# Patient Record
Sex: MALE | Race: BLACK OR AFRICAN AMERICAN | NOT HISPANIC OR LATINO | URBAN - METROPOLITAN AREA
[De-identification: names, ages, dates, MRNs, and addresses within clinical notes are randomized per-mention and may not be internally consistent; named-entity substitution may affect disease eponyms.]

---

## 2024-10-04 ENCOUNTER — EMERGENCY (EMERGENCY)
Facility: HOSPITAL | Age: 24
LOS: 0 days | Discharge: HOME | End: 2024-10-05
Attending: STUDENT IN AN ORGANIZED HEALTH CARE EDUCATION/TRAINING PROGRAM
Payer: MEDICAID

## 2024-10-04 VITALS
OXYGEN SATURATION: 94 % | HEART RATE: 125 BPM | RESPIRATION RATE: 22 BRPM | DIASTOLIC BLOOD PRESSURE: 81 MMHG | TEMPERATURE: 102 F | WEIGHT: 225.09 LBS | SYSTOLIC BLOOD PRESSURE: 145 MMHG | HEIGHT: 74 IN

## 2024-10-04 DIAGNOSIS — N45.1 EPIDIDYMITIS: ICD-10-CM

## 2024-10-04 DIAGNOSIS — N50.812 LEFT TESTICULAR PAIN: ICD-10-CM

## 2024-10-04 LAB
ALBUMIN SERPL ELPH-MCNC: 3.6 G/DL — SIGNIFICANT CHANGE UP (ref 3.3–5)
ALP SERPL-CCNC: 67 U/L — SIGNIFICANT CHANGE UP (ref 40–120)
ALT FLD-CCNC: 52 U/L — SIGNIFICANT CHANGE UP (ref 12–78)
ANION GAP SERPL CALC-SCNC: 6 MMOL/L — SIGNIFICANT CHANGE UP (ref 5–17)
APPEARANCE UR: CLEAR — SIGNIFICANT CHANGE UP
AST SERPL-CCNC: 16 U/L — SIGNIFICANT CHANGE UP (ref 15–37)
BASOPHILS # BLD AUTO: 0.06 K/UL — SIGNIFICANT CHANGE UP (ref 0–0.2)
BASOPHILS NFR BLD AUTO: 0.3 % — SIGNIFICANT CHANGE UP (ref 0–2)
BILIRUB SERPL-MCNC: 1 MG/DL — SIGNIFICANT CHANGE UP (ref 0.2–1.2)
BILIRUB UR-MCNC: NEGATIVE — SIGNIFICANT CHANGE UP
BUN SERPL-MCNC: 11 MG/DL — SIGNIFICANT CHANGE UP (ref 7–23)
CALCIUM SERPL-MCNC: 9.3 MG/DL — SIGNIFICANT CHANGE UP (ref 8.5–10.1)
CHLORIDE SERPL-SCNC: 105 MMOL/L — SIGNIFICANT CHANGE UP (ref 96–108)
CO2 SERPL-SCNC: 26 MMOL/L — SIGNIFICANT CHANGE UP (ref 22–31)
COLOR SPEC: YELLOW — SIGNIFICANT CHANGE UP
CREAT SERPL-MCNC: 1.12 MG/DL — SIGNIFICANT CHANGE UP (ref 0.5–1.3)
DIFF PNL FLD: NEGATIVE — SIGNIFICANT CHANGE UP
EGFR: 93 ML/MIN/1.73M2 — SIGNIFICANT CHANGE UP
EOSINOPHIL # BLD AUTO: 0.01 K/UL — SIGNIFICANT CHANGE UP (ref 0–0.5)
EOSINOPHIL NFR BLD AUTO: 0.1 % — SIGNIFICANT CHANGE UP (ref 0–6)
FLUAV AG NPH QL: SIGNIFICANT CHANGE UP
FLUBV AG NPH QL: SIGNIFICANT CHANGE UP
GLUCOSE SERPL-MCNC: 280 MG/DL — HIGH (ref 70–99)
GLUCOSE UR QL: >=1000 MG/DL
HCT VFR BLD CALC: 46.4 % — SIGNIFICANT CHANGE UP (ref 39–50)
HGB BLD-MCNC: 16 G/DL — SIGNIFICANT CHANGE UP (ref 13–17)
IMM GRANULOCYTES NFR BLD AUTO: 0.5 % — SIGNIFICANT CHANGE UP (ref 0–0.9)
KETONES UR-MCNC: ABNORMAL MG/DL
LACTATE SERPL-SCNC: 1.5 MMOL/L — SIGNIFICANT CHANGE UP (ref 0.7–2)
LEUKOCYTE ESTERASE UR-ACNC: NEGATIVE — SIGNIFICANT CHANGE UP
LYMPHOCYTES # BLD AUTO: 1.84 K/UL — SIGNIFICANT CHANGE UP (ref 1–3.3)
LYMPHOCYTES # BLD AUTO: 10.5 % — LOW (ref 13–44)
MAGNESIUM SERPL-MCNC: 1.6 MG/DL — SIGNIFICANT CHANGE UP (ref 1.6–2.6)
MCHC RBC-ENTMCNC: 28.8 PG — SIGNIFICANT CHANGE UP (ref 27–34)
MCHC RBC-ENTMCNC: 34.5 G/DL — SIGNIFICANT CHANGE UP (ref 32–36)
MCV RBC AUTO: 83.6 FL — SIGNIFICANT CHANGE UP (ref 80–100)
MONOCYTES # BLD AUTO: 1.9 K/UL — HIGH (ref 0–0.9)
MONOCYTES NFR BLD AUTO: 10.9 % — SIGNIFICANT CHANGE UP (ref 2–14)
NEUTROPHILS # BLD AUTO: 13.57 K/UL — HIGH (ref 1.8–7.4)
NEUTROPHILS NFR BLD AUTO: 77.7 % — HIGH (ref 43–77)
NITRITE UR-MCNC: NEGATIVE — SIGNIFICANT CHANGE UP
NRBC # BLD: 0 /100 WBCS — SIGNIFICANT CHANGE UP (ref 0–0)
PH UR: 5.5 — SIGNIFICANT CHANGE UP (ref 5–8)
PLATELET # BLD AUTO: 173 K/UL — SIGNIFICANT CHANGE UP (ref 150–400)
POTASSIUM SERPL-MCNC: 3.7 MMOL/L — SIGNIFICANT CHANGE UP (ref 3.5–5.3)
POTASSIUM SERPL-SCNC: 3.7 MMOL/L — SIGNIFICANT CHANGE UP (ref 3.5–5.3)
PROT SERPL-MCNC: 7.7 GM/DL — SIGNIFICANT CHANGE UP (ref 6–8.3)
PROT UR-MCNC: 100 MG/DL
RBC # BLD: 5.55 M/UL — SIGNIFICANT CHANGE UP (ref 4.2–5.8)
RBC # FLD: 12.6 % — SIGNIFICANT CHANGE UP (ref 10.3–14.5)
SARS-COV-2 RNA SPEC QL NAA+PROBE: SIGNIFICANT CHANGE UP
SODIUM SERPL-SCNC: 137 MMOL/L — SIGNIFICANT CHANGE UP (ref 135–145)
SP GR SPEC: >1.03 — HIGH (ref 1–1.03)
UROBILINOGEN FLD QL: 1 MG/DL — SIGNIFICANT CHANGE UP (ref 0.2–1)
WBC # BLD: 17.47 K/UL — HIGH (ref 3.8–10.5)
WBC # FLD AUTO: 17.47 K/UL — HIGH (ref 3.8–10.5)

## 2024-10-04 PROCEDURE — 71045 X-RAY EXAM CHEST 1 VIEW: CPT | Mod: 26

## 2024-10-04 PROCEDURE — 99285 EMERGENCY DEPT VISIT HI MDM: CPT

## 2024-10-04 PROCEDURE — 93010 ELECTROCARDIOGRAM REPORT: CPT

## 2024-10-04 PROCEDURE — 76870 US EXAM SCROTUM: CPT | Mod: 26

## 2024-10-04 RX ORDER — ACETAMINOPHEN 325 MG/1
975 TABLET ORAL ONCE
Refills: 0 | Status: COMPLETED | OUTPATIENT
Start: 2024-10-04 | End: 2024-10-04

## 2024-10-04 RX ORDER — SODIUM CHLORIDE 9 MG/ML
1000 INJECTION INTRAMUSCULAR; INTRAVENOUS; SUBCUTANEOUS ONCE
Refills: 0 | Status: COMPLETED | OUTPATIENT
Start: 2024-10-04 | End: 2024-10-04

## 2024-10-04 RX ORDER — DOXYCYCLINE MONOHYDRATE 100 MG
1 TABLET ORAL
Qty: 20 | Refills: 0
Start: 2024-10-04 | End: 2024-10-13

## 2024-10-04 RX ORDER — DOXYCYCLINE MONOHYDRATE 100 MG
100 TABLET ORAL ONCE
Refills: 0 | Status: COMPLETED | OUTPATIENT
Start: 2024-10-04 | End: 2024-10-04

## 2024-10-04 RX ORDER — ACETAMINOPHEN 325 MG/1
1000 TABLET ORAL ONCE
Refills: 0 | Status: COMPLETED | OUTPATIENT
Start: 2024-10-04 | End: 2024-10-04

## 2024-10-04 RX ADMIN — ACETAMINOPHEN 975 MILLIGRAM(S): 325 TABLET ORAL at 23:17

## 2024-10-04 RX ADMIN — Medication 500 MILLIGRAM(S): at 20:54

## 2024-10-04 RX ADMIN — ACETAMINOPHEN 400 MILLIGRAM(S): 325 TABLET ORAL at 18:10

## 2024-10-04 RX ADMIN — Medication 100 MILLIGRAM(S): at 20:54

## 2024-10-04 RX ADMIN — SODIUM CHLORIDE 1000 MILLILITER(S): 9 INJECTION INTRAMUSCULAR; INTRAVENOUS; SUBCUTANEOUS at 18:10

## 2024-10-04 NOTE — ED PROVIDER NOTE - CLINICAL SUMMARY MEDICAL DECISION MAKING FREE TEXT BOX
24-year-old male pmhx of no significant comes to ED w/ left testicular pain. Started randomly since 3 AM.  Due to persistent symptoms patient had come to emergency department. Their pain/symptom is moderate to severe, located in left testicle, constant, non mediating with rest. Otherwise ROS negative.  Denies concern for sexually transmitted infection.    General: non-toxic, NAD   HEENT: NCAT, PERRL   Cardiac: RRR, no murmurs, 2+ peripheral pulses   Chest: CTA   : Chaperoned by ED Tech, enlargement of testicle/epididymis with tenderness to palpation, intact cremasteric reflex, no visible external penile rashes, no visible penile discharge, no blood at the meatus.  Abdomen: soft, non-distended, bowel sounds present, no ttp, no rebound or guarding   Extremities: no peripheral edema, calf tenderness, or leg size discrepancies   Skin: no rashes   Neuro: AAOx4, 5+motor, sensory grossly intact   Psych: mood and affect appropriate    Impression: 24-year-old male pmhx of no significant comes to ED w/ left testicular pain. Their symptoms and exam findings are concerning for epididymitis versus torsion.    Ordered labs, imaging, medications for diagnosis, management, and treatment.

## 2024-10-04 NOTE — ED ADULT TRIAGE NOTE - NSSEPSISSUSPECTED_ED_A_ED
Yes Alert and oriented to person, place and time/Awake/Symptoms improved/Dressing clean and dry/No adverse reaction to first time med in ED

## 2024-10-04 NOTE — ED PROVIDER NOTE - PROGRESS NOTE DETAILS
Patient’s prescription sent to their pharmacy indicated during interview. Improvement on symptoms. Passed PO challenge. Spoke to patient/family about results including incidental findings. Plan to discharge patient. Patient given PCP follow up and return precautions. Patient/family agrees with plan.

## 2024-10-04 NOTE — ED PROVIDER NOTE - NSFOLLOWUPINSTRUCTIONS_ED_ALL_ED_FT
You were seen in the Emergency Department for testicular pain and swelling and you were found to have epididymitis.     1) Advance activity as tolerated.   2) New prescription sent to pharmacy indicated in interview take prescription as prescribed. Continue all previously prescribed medications as directed.    3) Follow up with your primary care physician in 24-48 hours - take copies of your results.    4) Return to the Emergency Department for worsening or persistent symptoms, and/or ANY NEW OR CONCERNING SYMPTOMS.     SEEK IMMEDIATE MEDICAL CARE IF YOU HAVE ANY OF THE FOLLOWING SYMPTOMS: severe abdominal pain, high fever, nausea/vomiting, or unintended weight loss.

## 2024-10-04 NOTE — ED ADULT NURSE NOTE - OBJECTIVE STATEMENT
23 yo male, A&Ox4, ambulatory presents to ED c/o Testicular pain with bump since yesterday. Denies fever (prior to today) or urinary symptoms HX HTN

## 2024-10-05 VITALS
DIASTOLIC BLOOD PRESSURE: 86 MMHG | HEART RATE: 104 BPM | RESPIRATION RATE: 20 BRPM | TEMPERATURE: 99 F | SYSTOLIC BLOOD PRESSURE: 120 MMHG | OXYGEN SATURATION: 98 %

## 2024-10-05 LAB
CULTURE RESULTS: SIGNIFICANT CHANGE UP
SPECIMEN SOURCE: SIGNIFICANT CHANGE UP
T PALLIDUM AB TITR SER: NEGATIVE — SIGNIFICANT CHANGE UP

## 2024-10-05 RX ORDER — IBUPROFEN 600 MG
600 TABLET ORAL ONCE
Refills: 0 | Status: COMPLETED | OUTPATIENT
Start: 2024-10-05 | End: 2024-10-05

## 2024-10-05 RX ADMIN — Medication 600 MILLIGRAM(S): at 01:45

## 2024-10-06 ENCOUNTER — INPATIENT (INPATIENT)
Facility: HOSPITAL | Age: 24
LOS: 15 days | Discharge: NURSING, REHAB OR LONG-TERM CARE | End: 2024-10-22
Attending: STUDENT IN AN ORGANIZED HEALTH CARE EDUCATION/TRAINING PROGRAM | Admitting: STUDENT IN AN ORGANIZED HEALTH CARE EDUCATION/TRAINING PROGRAM
Payer: MEDICAID

## 2024-10-06 VITALS
RESPIRATION RATE: 18 BRPM | TEMPERATURE: 102 F | SYSTOLIC BLOOD PRESSURE: 146 MMHG | HEIGHT: 74 IN | DIASTOLIC BLOOD PRESSURE: 97 MMHG | OXYGEN SATURATION: 98 % | WEIGHT: 279.99 LBS | HEART RATE: 132 BPM

## 2024-10-06 DIAGNOSIS — A41.9 SEPSIS, UNSPECIFIED ORGANISM: ICD-10-CM

## 2024-10-06 DIAGNOSIS — R03.0 ELEVATED BLOOD-PRESSURE READING, WITHOUT DIAGNOSIS OF HYPERTENSION: ICD-10-CM

## 2024-10-06 DIAGNOSIS — R73.9 HYPERGLYCEMIA, UNSPECIFIED: ICD-10-CM

## 2024-10-06 DIAGNOSIS — N49.2 INFLAMMATORY DISORDERS OF SCROTUM: ICD-10-CM

## 2024-10-06 LAB
ALBUMIN SERPL ELPH-MCNC: 3.1 G/DL — LOW (ref 3.3–5)
ALP SERPL-CCNC: 100 U/L — SIGNIFICANT CHANGE UP (ref 40–120)
ALT FLD-CCNC: 33 U/L — SIGNIFICANT CHANGE UP (ref 12–78)
ANION GAP SERPL CALC-SCNC: 9 MMOL/L — SIGNIFICANT CHANGE UP (ref 5–17)
APPEARANCE UR: CLEAR — SIGNIFICANT CHANGE UP
APTT BLD: 33.9 SEC — SIGNIFICANT CHANGE UP (ref 24.5–35.6)
AST SERPL-CCNC: 15 U/L — SIGNIFICANT CHANGE UP (ref 15–37)
BACTERIA # UR AUTO: ABNORMAL /HPF
BASOPHILS # BLD AUTO: 0 K/UL — SIGNIFICANT CHANGE UP (ref 0–0.2)
BASOPHILS NFR BLD AUTO: 0 % — SIGNIFICANT CHANGE UP (ref 0–2)
BILIRUB SERPL-MCNC: 1.1 MG/DL — SIGNIFICANT CHANGE UP (ref 0.2–1.2)
BILIRUB UR-MCNC: NEGATIVE — SIGNIFICANT CHANGE UP
BUN SERPL-MCNC: 13 MG/DL — SIGNIFICANT CHANGE UP (ref 7–23)
CALCIUM SERPL-MCNC: 9.7 MG/DL — SIGNIFICANT CHANGE UP (ref 8.5–10.1)
CHLORIDE SERPL-SCNC: 103 MMOL/L — SIGNIFICANT CHANGE UP (ref 96–108)
CO2 SERPL-SCNC: 23 MMOL/L — SIGNIFICANT CHANGE UP (ref 22–31)
COLOR SPEC: YELLOW — SIGNIFICANT CHANGE UP
CREAT SERPL-MCNC: 1.05 MG/DL — SIGNIFICANT CHANGE UP (ref 0.5–1.3)
DIFF PNL FLD: ABNORMAL
EGFR: 100 ML/MIN/1.73M2 — SIGNIFICANT CHANGE UP
EOSINOPHIL # BLD AUTO: 0 K/UL — SIGNIFICANT CHANGE UP (ref 0–0.5)
EOSINOPHIL NFR BLD AUTO: 0 % — SIGNIFICANT CHANGE UP (ref 0–6)
FLUAV AG NPH QL: SIGNIFICANT CHANGE UP
FLUBV AG NPH QL: SIGNIFICANT CHANGE UP
GLUCOSE SERPL-MCNC: 340 MG/DL — HIGH (ref 70–99)
GLUCOSE UR QL: >=1000 MG/DL
HCT VFR BLD CALC: 45.7 % — SIGNIFICANT CHANGE UP (ref 39–50)
HGB BLD-MCNC: 15.9 G/DL — SIGNIFICANT CHANGE UP (ref 13–17)
HYPOSEGMENTATION: PRESENT — SIGNIFICANT CHANGE UP
INR BLD: 1.29 RATIO — HIGH (ref 0.85–1.16)
KETONES UR-MCNC: 15 MG/DL
LACTATE SERPL-SCNC: 1.7 MMOL/L — SIGNIFICANT CHANGE UP (ref 0.7–2)
LEUKOCYTE ESTERASE UR-ACNC: NEGATIVE — SIGNIFICANT CHANGE UP
LYMPHOCYTES # BLD AUTO: 0.95 K/UL — LOW (ref 1–3.3)
LYMPHOCYTES # BLD AUTO: 5 % — LOW (ref 13–44)
MANUAL SMEAR VERIFICATION: SIGNIFICANT CHANGE UP
MCHC RBC-ENTMCNC: 29 PG — SIGNIFICANT CHANGE UP (ref 27–34)
MCHC RBC-ENTMCNC: 34.8 G/DL — SIGNIFICANT CHANGE UP (ref 32–36)
MCV RBC AUTO: 83.4 FL — SIGNIFICANT CHANGE UP (ref 80–100)
MONOCYTES # BLD AUTO: 1.89 K/UL — HIGH (ref 0–0.9)
MONOCYTES NFR BLD AUTO: 10 % — SIGNIFICANT CHANGE UP (ref 2–14)
NEUTROPHILS # BLD AUTO: 15.7 K/UL — HIGH (ref 1.8–7.4)
NEUTROPHILS NFR BLD AUTO: 68 % — SIGNIFICANT CHANGE UP (ref 43–77)
NEUTS BAND # BLD: 15 % — HIGH (ref 0–8)
NEUTS VAC BLD QL SMEAR: SIGNIFICANT CHANGE UP
NITRITE UR-MCNC: NEGATIVE — SIGNIFICANT CHANGE UP
NRBC # BLD: 0 /100 WBCS — SIGNIFICANT CHANGE UP (ref 0–0)
NRBC # BLD: SIGNIFICANT CHANGE UP /100 WBCS (ref 0–0)
PH UR: 5.5 — SIGNIFICANT CHANGE UP (ref 5–8)
PLAT MORPH BLD: NORMAL — SIGNIFICANT CHANGE UP
PLATELET # BLD AUTO: 172 K/UL — SIGNIFICANT CHANGE UP (ref 150–400)
POTASSIUM SERPL-MCNC: 4.1 MMOL/L — SIGNIFICANT CHANGE UP (ref 3.5–5.3)
POTASSIUM SERPL-SCNC: 4.1 MMOL/L — SIGNIFICANT CHANGE UP (ref 3.5–5.3)
PROT SERPL-MCNC: 8.2 GM/DL — SIGNIFICANT CHANGE UP (ref 6–8.3)
PROT UR-MCNC: 100 MG/DL
PROTHROM AB SERPL-ACNC: 14.5 SEC — HIGH (ref 9.9–13.4)
RBC # BLD: 5.48 M/UL — SIGNIFICANT CHANGE UP (ref 4.2–5.8)
RBC # FLD: 12.5 % — SIGNIFICANT CHANGE UP (ref 10.3–14.5)
RBC BLD AUTO: NORMAL — SIGNIFICANT CHANGE UP
RBC CASTS # UR COMP ASSIST: 5 /HPF — HIGH (ref 0–4)
SARS-COV-2 RNA SPEC QL NAA+PROBE: SIGNIFICANT CHANGE UP
SODIUM SERPL-SCNC: 135 MMOL/L — SIGNIFICANT CHANGE UP (ref 135–145)
SP GR SPEC: >1.03 — HIGH (ref 1–1.03)
UROBILINOGEN FLD QL: 1 MG/DL — SIGNIFICANT CHANGE UP (ref 0.2–1)
VARIANT LYMPHS # BLD: 2 % — SIGNIFICANT CHANGE UP (ref 0–6)
WBC # BLD: 18.92 K/UL — HIGH (ref 3.8–10.5)
WBC # FLD AUTO: 18.92 K/UL — HIGH (ref 3.8–10.5)
WBC UR QL: 3 /HPF — SIGNIFICANT CHANGE UP (ref 0–5)

## 2024-10-06 PROCEDURE — 99223 1ST HOSP IP/OBS HIGH 75: CPT

## 2024-10-06 PROCEDURE — 74177 CT ABD & PELVIS W/CONTRAST: CPT | Mod: 26,MC

## 2024-10-06 PROCEDURE — 99285 EMERGENCY DEPT VISIT HI MDM: CPT

## 2024-10-06 PROCEDURE — 93010 ELECTROCARDIOGRAM REPORT: CPT

## 2024-10-06 PROCEDURE — 71045 X-RAY EXAM CHEST 1 VIEW: CPT | Mod: 26

## 2024-10-06 PROCEDURE — 99222 1ST HOSP IP/OBS MODERATE 55: CPT

## 2024-10-06 RX ORDER — ENOXAPARIN SODIUM 40MG/0.4ML
40 SYRINGE (ML) SUBCUTANEOUS EVERY 12 HOURS
Refills: 0 | Status: DISCONTINUED | OUTPATIENT
Start: 2024-10-06 | End: 2024-10-07

## 2024-10-06 RX ORDER — ACETAMINOPHEN 500 MG
975 TABLET ORAL ONCE
Refills: 0 | Status: COMPLETED | OUTPATIENT
Start: 2024-10-06 | End: 2024-10-06

## 2024-10-06 RX ORDER — ONDANSETRON HYDROCHLORIDE 2 MG/ML
4 INJECTION, SOLUTION INTRAMUSCULAR; INTRAVENOUS EVERY 8 HOURS
Refills: 0 | Status: DISCONTINUED | OUTPATIENT
Start: 2024-10-06 | End: 2024-10-07

## 2024-10-06 RX ORDER — PIPERACILLIN AND TAZOBACTAM .5; 4 G/20ML; G/20ML
3.38 INJECTION, POWDER, LYOPHILIZED, FOR SOLUTION INTRAVENOUS ONCE
Refills: 0 | Status: COMPLETED | OUTPATIENT
Start: 2024-10-06 | End: 2024-10-06

## 2024-10-06 RX ORDER — MORPHINE SULFATE 30 MG/1
4 TABLET, EXTENDED RELEASE ORAL ONCE
Refills: 0 | Status: DISCONTINUED | OUTPATIENT
Start: 2024-10-06 | End: 2024-10-06

## 2024-10-06 RX ORDER — MAGNESIUM, ALUMINUM HYDROXIDE 200-200 MG
30 TABLET,CHEWABLE ORAL EVERY 4 HOURS
Refills: 0 | Status: DISCONTINUED | OUTPATIENT
Start: 2024-10-06 | End: 2024-10-07

## 2024-10-06 RX ORDER — VANCOMYCIN HYDROCHLORIDE 50 MG/ML
1500 KIT ORAL ONCE
Refills: 0 | Status: COMPLETED | OUTPATIENT
Start: 2024-10-06 | End: 2024-10-06

## 2024-10-06 RX ORDER — INFLUENZ VIR VAC TV P-SURF2003 15MCG/.5ML
0.5 SYRINGE (ML) INTRAMUSCULAR ONCE
Refills: 0 | Status: DISCONTINUED | OUTPATIENT
Start: 2024-10-06 | End: 2024-10-22

## 2024-10-06 RX ORDER — CLINDAMYCIN PHOSPHATE 150 MG/ML
900 VIAL (ML) INJECTION EVERY 8 HOURS
Refills: 0 | Status: DISCONTINUED | OUTPATIENT
Start: 2024-10-06 | End: 2024-10-07

## 2024-10-06 RX ORDER — CLINDAMYCIN PHOSPHATE 150 MG/ML
900 VIAL (ML) INJECTION ONCE
Refills: 0 | Status: COMPLETED | OUTPATIENT
Start: 2024-10-06 | End: 2024-10-06

## 2024-10-06 RX ORDER — ACETAMINOPHEN 500 MG
650 TABLET ORAL EVERY 6 HOURS
Refills: 0 | Status: DISCONTINUED | OUTPATIENT
Start: 2024-10-06 | End: 2024-10-07

## 2024-10-06 RX ORDER — ACETAMINOPHEN 500 MG
1000 TABLET ORAL ONCE
Refills: 0 | Status: COMPLETED | OUTPATIENT
Start: 2024-10-06 | End: 2024-10-06

## 2024-10-06 RX ORDER — MELATONIN 5 MG
3 TABLET ORAL AT BEDTIME
Refills: 0 | Status: DISCONTINUED | OUTPATIENT
Start: 2024-10-06 | End: 2024-10-07

## 2024-10-06 RX ADMIN — MORPHINE SULFATE 4 MILLIGRAM(S): 30 TABLET, EXTENDED RELEASE ORAL at 18:45

## 2024-10-06 RX ADMIN — Medication 1000 MILLIGRAM(S): at 21:37

## 2024-10-06 RX ADMIN — Medication 400 MILLIGRAM(S): at 20:37

## 2024-10-06 RX ADMIN — MORPHINE SULFATE 4 MILLIGRAM(S): 30 TABLET, EXTENDED RELEASE ORAL at 18:58

## 2024-10-06 RX ADMIN — Medication 975 MILLIGRAM(S): at 15:27

## 2024-10-06 RX ADMIN — MORPHINE SULFATE 4 MILLIGRAM(S): 30 TABLET, EXTENDED RELEASE ORAL at 14:39

## 2024-10-06 RX ADMIN — MORPHINE SULFATE 4 MILLIGRAM(S): 30 TABLET, EXTENDED RELEASE ORAL at 15:39

## 2024-10-06 RX ADMIN — Medication 900 MILLIGRAM(S): at 15:39

## 2024-10-06 RX ADMIN — Medication 100 MILLIGRAM(S): at 23:40

## 2024-10-06 RX ADMIN — PIPERACILLIN AND TAZOBACTAM 3.38 GRAM(S): .5; 4 INJECTION, POWDER, LYOPHILIZED, FOR SOLUTION INTRAVENOUS at 15:09

## 2024-10-06 RX ADMIN — PIPERACILLIN AND TAZOBACTAM 200 GRAM(S): .5; 4 INJECTION, POWDER, LYOPHILIZED, FOR SOLUTION INTRAVENOUS at 14:39

## 2024-10-06 RX ADMIN — VANCOMYCIN HYDROCHLORIDE 250 MILLIGRAM(S): KIT at 15:42

## 2024-10-06 RX ADMIN — Medication 975 MILLIGRAM(S): at 14:27

## 2024-10-06 RX ADMIN — Medication 40 MILLIGRAM(S): at 18:00

## 2024-10-06 RX ADMIN — Medication 2500 MILLILITER(S): at 14:26

## 2024-10-06 RX ADMIN — Medication 100 MILLIGRAM(S): at 15:09

## 2024-10-06 RX ADMIN — Medication 2500 MILLILITER(S): at 15:26

## 2024-10-06 NOTE — ED PROVIDER NOTE - PHYSICAL EXAMINATION
PHYSICAL EXAM:    GENERAL: Alert, appears stated age, non-toxic  SKIN: Warm, and dry.   HEAD: NC, AT  EYE: Normal lids/conjunctiva  ENT: Normal hearing, patent oropharynx   NECK: +supple. No meningismus, or JVD  Pulm: Bilateral BS, normal resp effort, no wheezes, stridor, or retractions  CV: RRR, no M/R/G, 2+and = radial pulses  Abd: soft, non-tender, non-distended, no rebound/guarding. no CVA tenderness.   : external genitalia, edematous, erythematous, ttp. no crepitus/streaking. no lad. testicles descended bilaterally. +cremasteric reflexes bilaterally. no discharge. Performed by Dr. Pleitez, chaperoned by me.    Mskel: no erythema, cyanosis, edema. no calf tenderness.   Neuro: AAOx3, moving extremities

## 2024-10-06 NOTE — CONSULT NOTE ADULT - SUBJECTIVE AND OBJECTIVE BOX
Patient is a 24y old  Male who presents with a chief complaint of     HPI: 25 yo M no sig pmhx presented to er on 10/4 for left sided scrotal swelling and pain associated with fevers, found to haveleukocytosis on 17.47, scrotal US consistent with soft tissue infection and d/c home on doxy. pt returned to er on 10/6 for worsening scrotal swelling and pain, and persistent fevers. pt denies any injury, cut, trauma to the scrotal area. denies any discharge, dysuria, hematuria, pain in the penis.       PAST MEDICAL & SURGICAL HISTORY:      Review of Systems:  Negative except  as above in HPI    MEDICATIONS  (STANDING):    MEDICATIONS  (PRN):      Allergies    Allergy Status Unknown    Intolerances        SOCIAL HISTORY nonsmoker, no etoh use                Vital Signs Last 24 Hrs  T(C): 38.8 (06 Oct 2024 13:32), Max: 38.8 (06 Oct 2024 13:32)  T(F): 101.9 (06 Oct 2024 13:32), Max: 101.9 (06 Oct 2024 13:32)  HR: 132 (06 Oct 2024 13:32) (132 - 132)  BP: 146/97 (06 Oct 2024 13:32) (146/97 - 146/97)  BP(mean): --  RR: 18 (06 Oct 2024 13:32) (18 - 18)  SpO2: 98% (06 Oct 2024 13:32) (98% - 98%)        Physical Exam:  General:  Appears stated age, well-groomed, uncomfortable 2/2 pain   Eyes: EOMI, conjunctiva clear  HENT:  WNL, no JVD  Chest: no respiratory distress, no accessory muscle use  : uncircumcised phallus. diffuse scrotal edema, erythema, induration. + ttp. no area of fluctuance, open wound or active drainage.   Neuro/Psych:  Alert, oriented to time, place and person     LABS:                        15.9   18.92 )-----------( 172      ( 06 Oct 2024 14:28 )             45.7     10-06    135  |  103  |  13  ----------------------------<  340[H]  4.1   |  23  |  1.05    Ca    9.7      06 Oct 2024 14:28  Mg     1.6     10-04    TPro  8.2  /  Alb  3.1[L]  /  TBili  1.1  /  DBili  x   /  AST  15  /  ALT  33  /  AlkPhos  100  10-06    PT/INR - ( 06 Oct 2024 14:28 )   PT: 14.5 sec;   INR: 1.29 ratio         PTT - ( 06 Oct 2024 14:28 )  PTT:33.9 sec  Urinalysis Basic - ( 06 Oct 2024 14:51 )    Color: Yellow / Appearance: Clear / SG: >1.030 / pH: x  Gluc: x / Ketone: 15 mg/dL  / Bili: Negative / Urobili: 1.0 mg/dL   Blood: x / Protein: 100 mg/dL / Nitrite: Negative   Leuk Esterase: Negative / RBC: 5 /HPF / WBC 3 /HPF   Sq Epi: x / Non Sq Epi: x / Bacteria: Few /HPF      Culture Results:   <10,000 CFU/mL Normal Urogenital Prieto (10-04 @ 18:06)      RADIOLOGY & ADDITIONAL STUDIES:  < from: CT Abdomen and Pelvis w/ IV Cont (10.06.24 @ 15:18) >  BLADDER: Under distended, unremarkable.  REPRODUCTIVE ORGANS: Prostate within normal limits. Probable generalized   scrotal skin thickening/edema. There is mild perineal and base of the   penis extension. No focal gas collections.    BOWEL: No bowel obstruction. Appendix is normal.  PERITONEUM/RETROPERITONEUM: Within normal limits.  VESSELS: Within normal limits.  LYMPH NODES: Left retroperitoneal nodes measuring up to 1.4 cm in short   axis below the level of left renal vein.  ABDOMINAL WALL: As above.  BONES: Within normal limits.    IMPRESSION:  Diffuse edema involving the scrotal skin with mild perineal extension. No   focal gas collections.        --- End of Report ---      < end of copied text >      A/P; 25 yo M no significant pmhx here with worsening scrotal cellulitis that failed outpt antibiotics tx with doxycyline.   febrile tmax 101.9, tachycardic. leukocytosis of 18.92  lactate wnl, CT with no focal gas collection.   Ucx 10/4 normal urogenital prieto  -recommend medical admission for IV antibiotics  -advise ID consult  -no acute urological intervention  -analgesics prn, elevate scrotum, warm compresses  -f/u rpt ucx, and bcx  -case discussed with Dr Martin, will continue to follow

## 2024-10-06 NOTE — H&P ADULT - ASSESSMENT
Jose Low Jr. is a 24 year old male with no significant PMHx who presented to the ED on 10/6/24 for complaints of left testicular pain and admitted for sepsis secondary to scrotal cellulitis vs. epididymitis.    Sepsis secondary to scrotal cellulitis vs. epididymitis  Complaints of L. testicular pain x 4 days, described as a burning sensation  Denies trauma to area, not sexually active  Took 2-day course of doxycycline 100 mg BID without improvement of symptoms and still spiking fevers with Tmax 101 at home  U/A (on 10/4) with trace ketones, proteinuria, negative leuks, negative nitrites, WBC 0, RBC 1, occasional bacteria, glucosuria  Urine culture (on 10/4) with normal urogenital prieto  U/S scrotum and testicles (on 10/4) without evidence of testicular torsion but with marked L. scrotal wall thickening, edema, and mild hyperemia of L. epididymal body and tail  Temp 101.9, , WBC 18.92K on admission  U/A with ketonuria, proteinuria, negative nitrites, negative leuks, small blood, WBC 3, RBC 5, few bacteria, glucosuria  CT A/P with diffuse edema involving the scrotal skin with mild perineal extension, no focal gas collections  S/p LR 2500 cc bolus, vancomycin 1500 mg IV, zosyn 3.375 g IV, clindamycin 900 mg IV, acetaminophen 975 mg PO, and morphine 4 mg IV in the ED  Elevate scrotum, warm compresses, pain management PRN  Clindamycin 900 mg IV q8 continued for now  No acute surgical intervention as per urology  F/u blood cultures, urine culture  Monitor fever curve and WBC trend  Consider ID consult  Urology recommendations appreciated    Hyperglycemia  Blood glucose 340 on admission  U/A with ketonuria and glucosuria  F/u A1c    Elevated blood pressure without diagnosis of HTN, suspect secondary to pain  BP as elevated as 153/98 on admission  Will hold off on initiation of antihypertensives at this time  Anticipate improvement of BP with adequate pain control    Plan of care discussed with sister at bedside.

## 2024-10-06 NOTE — H&P ADULT - NSHPLABSRESULTS_GEN_ALL_CORE
15.9   18.92 )-----------( 172      ( 06 Oct 2024 14:28 )             45.7     135  |  103  |  13  ----------------------------<  340[H]     10-06  4.1   |  23  |  1.05    Ca    9.7      06 Oct 2024 14:28    TPro  8.2  /  Alb  3.1[L]  /  TBili  1.1  /  DBili  x   /  AST  15  /  ALT  33  /  AlkPhos  100  10-06    PT/INR: 14.5/1.29 (10-06-24 @ 14:28)  PTT: 33.9 (10-06-24 @ 14:28)    Urinalysis Basic - ( 06 Oct 2024 14:51 )  Color: Yellow / Appearance: Clear / SG: >1.030 / pH: 5.5  Gluc: >=1000 mg/dL / Ketone: 15 mg/dL  / Bili: Negative / Urobili: 1.0 mg/dL   Blood: Small / Protein: 100 mg/dL / Nitrite: Negative   Leuk Esterase: Negative / RBC: 5 /HPF / WBC 3 /HPF   Sq Epi: x / Bacteria: Few /HPF  Hyaline Casts: x/WBC Casts: x    Culture - Urine (collected 04 Oct 2024 18:06)  Source: Clean Catch Clean Catch (Midstream)  Final Report (05 Oct 2024 22:41):    <10,000 CFU/mL Normal Urogenital Henny    CT A/P with IV contrast 10/6/24  FINDINGS:  LOWER CHEST: Trace atelectasis.    LIVER: Steatosis.  BILE DUCTS: Normal caliber.  GALLBLADDER: Within normal limits.  SPLEEN: Within normal limits.  PANCREAS: Within normal limits.  ADRENALS: Within normal limits.  KIDNEYS/URETERS: Within normal limits.    BLADDER: Under distended, unremarkable.  REPRODUCTIVE ORGANS: Prostate within normal limits. Probable generalized scrotal skin thickening/edema. There is mild perineal and base of the penis extension. No focal gas collections.    BOWEL: No bowel obstruction. Appendix is normal.  PERITONEUM/RETROPERITONEUM: Within normal limits.  VESSELS: Within normal limits.  LYMPH NODES: Left retroperitoneal nodes measuring up to 1.4 cm in short   axis below the level of left renal vein.  ABDOMINAL WALL: As above.  BONES: Within normal limits.    IMPRESSION:  Diffuse edema involving the scrotal skin with mild perineal extension. No focal gas collections.    U/S testicles 10/4/24  IMPRESSION:  No sonographic evidence of testicular torsion.    Marked left scrotal wall thickening, edema, and mild hyperemia. Correlate clinically forsoft tissue infection. No drainable collection.    Mild hyperemia of the left epididymal body and tail. Early epididymitis cannot be excluded.

## 2024-10-06 NOTE — ED ADULT TRIAGE NOTE - CHIEF COMPLAINT QUOTE
pt c/o testicular swellling started x 3 days ago, pt seen in ED x 2 days ago and given abx. pt now complains of worsening pain, headache and consistent fever.

## 2024-10-06 NOTE — PATIENT PROFILE ADULT - FALL HARM RISK - HARM RISK INTERVENTIONS
Assistance OOB with selected safe patient handling equipment/Communicate Risk of Fall with Harm to all staff/Reinforce activity limits and safety measures with patient and family/Tailored Fall Risk Interventions/Toileting schedule using arm’s reach rule for commode and bathroom/Visual Cue: Yellow wristband and red socks/Bed in lowest position, wheels locked, appropriate side rails in place/Call bell, personal items and telephone in reach/Instruct patient to call for assistance before getting out of bed or chair/Non-slip footwear when patient is out of bed/[place] to call system/Physically safe environment - no spills, clutter or unnecessary equipment/Purposeful Proactive Rounding/Room/bathroom lighting operational, light cord in reach

## 2024-10-06 NOTE — H&P ADULT - NSHPPHYSICALEXAM_GEN_ALL_CORE
T(C): 37.8 (10-06-24 @ 18:34), Max: 38.8 (10-06-24 @ 13:32)  HR: 122 (10-06-24 @ 18:34) (118 - 132)  BP: 143/80 (10-06-24 @ 18:34) (139/90 - 153/98)  RR: 20 (10-06-24 @ 18:34) (18 - 20)  SpO2: 95% (10-06-24 @ 18:34) (95% - 98%)    CONSTITUTIONAL: Well groomed, uncomfortable appearing  EYES: PERRLA and symmetric, EOMI  ENMT: Oral mucosa with moist membranes  RESP: No respiratory distress, no use of accessory muscles; CTA b/l  CV: RRR  GI: Soft, NT, ND  : scrotum with erythema, diffuse swelling, and tender to palpation, no open wounds or purulent drainage

## 2024-10-06 NOTE — ED ADULT NURSE NOTE - OBJECTIVE STATEMENT
24 year old male A/Ox4 c/o testicular pain x 3 days ago, pt reports fever, diarrhea, testicular pain, pt denies burning urination, blood in the urine, constipation, pt reports seen in ED x 2 days ago for same pain, prescribed abx but pain came back, pt reports no past medical history and pt ambulatory with steady gait

## 2024-10-06 NOTE — H&P ADULT - HISTORY OF PRESENT ILLNESS
Jose Low Jr. is a 24 year old male with no significant PMHx who presented to the ED on 10/6/24 for complaints of left testicular pain.    Patient reports he has been having left testicular pain since Thursday. Described as a burning sensation and severe pain to the point where he is unable to eat or sleep. Severity was 10/10. Did not take any analgesics for the pain. No trauma to area. Not currently sexually active and has never been. Came to the ER on 10/4/24 for further evaluation. WBC 17.47K at that time. U/A with trace ketones, proteinuria, negative leuks, negative nitrites, WBC 0, RBC 1, occasional bacteria, glucosuria. U/S scrotum and testicles without evidence of testicular torsion but with marked left scrotal wall thickening, edema, and mild hyperemia of left epididymal body and tail. Received ceftriaxone 500 mg IV x 1. Discharged home with prescription for doxycycline 100 mg BID. Spiking fevers with Tmax 101 and severe pain despite taking doxycycline and tylenol which prompted another visit to ER.     In the ED, Tmax 101.9, HR as elevated as 132, BP as elevated as 153/98. WBC 18.92K, blood glucose 340. U/A with ketonuria, proteinuria, negative nitrites, negative leuks, small blood, WBC 3, RBC 5, few bacteria, glucosuria. CT A/P with diffuse edema involving the scrotal skin with mild perineal extension. No focal gas collections. Received LR 2500 cc bolus, vancomycin 1500 mg IV, zosyn 3.375 g IV, clindamycin 900 mg IV, acetaminophen 975 mg PO, and morphine 4 mg IV. Evaluated by urology who recommended admission for IV antibiotics and does not recommend acute urologic intervention.

## 2024-10-06 NOTE — ED PROVIDER NOTE - ATTENDING APP SHARED VISIT CONTRIBUTION OF CARE
This patient was evaluated with GRACE Snell.  The patient's HPI, ROS, and physical exam above were noted and agreed to unless otherwise commented on below.  Nursing notes and vital signs were reviewed.  Patient arrived was noted to have a large amount of scrotal swelling and erythema.  There is an initial concern for Raven's.  Patient was given clindamycin, vancomycin, and Zosyn.  Does have elevation of his white blood cell count at 18.92.  No acute anemia.  No thrombocytopenia.  Has an elevated INR at 1.29.  No significant electrolyte abnormalities.  Elevated glucose.  No acute elevated LFTs.  Urinalysis shows no evidence of urinary tract infection.  COVID and flu are negative.  Patient CT of the abdomen pelvis shows no evidence of Raven's gangrene at this time.  Shows diffuse edema involving scrotal skin and mild peritoneal extension.  No focal gas collection is noted.  Likely has evidence of scrotal cellulitis with failed outpatient antibiotics.    Discussed this case with the surgery PA who came and evaluated the patient.  They also believe that the patient may have scrotal cellulitis.  Recommend admission to the medical service with surgery follow-up.    PA was able to discuss this case with the hospitalist service who accepted admission for the patient.  Patient experienced understanding of all findings.  He was amenable to this plan.  Amenable for admission at this time.    This patient presents with evidence of a high probability of an imminent life or limb threatening condition requiring rapid intervention to prevent deterioration in the patient’s condition.  High complexity decision making to assess, manipulate, and support this patient is documented below.  It is my clinical judgement that failure to initiate these interventions on an urgent basis would likely result in sudden, clinically significant or life threatening deterioration in the patient's condition.      The total time spent evaluating, managing, and providing care to a critically ill patient was: 36 minutes.  This includes direct patient care at the bedside as well as time spent reviewing test results, discussing the case with consultants or family members, and documenting in the patient's chart.   It was exclusive of separately billable procedures and any teaching time related to this case.    Please see the rest of the note for further information on patient assessment and treatment.

## 2024-10-06 NOTE — ED PROVIDER NOTE - CLINICAL SUMMARY MEDICAL DECISION MAKING FREE TEXT BOX
24-year-old male with PMH HTN noncompliant with medications, here 2 days ago for testicular swelling and treated with epididymitis presents with fever Tmax 101F increased scrotal swelling/rash, dysuria x 2 days.   no palliating/provoking factors.   no cp, sob, back pain, abd pain, rash elsewhere   ddx includes: scrotal cellulitis, abscess, necrotizing fasciitis  discussed with surgical team-- no evidence of nec fasc including on CT  discussed with Dr. Ragland, accepts admission

## 2024-10-06 NOTE — ED PROVIDER NOTE - CARE PLAN
1 Principal Discharge DX:	Cellulitis, scrotum  Secondary Diagnosis:	Sepsis  Secondary Diagnosis:	Bandemia

## 2024-10-06 NOTE — ED ADULT NURSE NOTE - NSFALLUNIVINTERV_ED_ALL_ED
Bed/Stretcher in lowest position, wheels locked, appropriate side rails in place/Call bell, personal items and telephone in reach/Instruct patient to call for assistance before getting out of bed/chair/stretcher/Non-slip footwear applied when patient is off stretcher/[place] to call system/Physically safe environment - no spills, clutter or unnecessary equipment/Purposeful proactive rounding/Room/bathroom lighting operational, light cord in reach

## 2024-10-06 NOTE — H&P ADULT - NSHPROSALLOTHERNEGRD_GEN_ALL_CORE
All other review of systems negative, except as noted in HPI
Patient/Caregiver provided printed discharge information.

## 2024-10-06 NOTE — H&P ADULT - TIME BILLING
coordination of care with ER physician and ER hold RN, obtaining history, performing a physical examination, reviewing and interpreting labs and imaging, ordering further studies and tests, explaining the diagnosis and treatment plan to patient and sister at bedside, and documentation as above.

## 2024-10-06 NOTE — ED ADULT NURSE NOTE - ED STAT RN HANDOFF DETAILS
report given to Sandra THOMPSON, pt A/Ox4, pt in bed, even and unlabored respirations noted, no signs or symptoms of acute distress noted at this time, safety measures maintained

## 2024-10-07 LAB
A1C WITH ESTIMATED AVERAGE GLUCOSE RESULT: 13 % — HIGH (ref 4–5.6)
ALBUMIN SERPL ELPH-MCNC: 2.3 G/DL — LOW (ref 3.3–5)
ALP SERPL-CCNC: 89 U/L — SIGNIFICANT CHANGE UP (ref 40–120)
ALT FLD-CCNC: 28 U/L — SIGNIFICANT CHANGE UP (ref 12–78)
ANION GAP SERPL CALC-SCNC: 11 MMOL/L — SIGNIFICANT CHANGE UP (ref 5–17)
ANISOCYTOSIS BLD QL: SLIGHT — SIGNIFICANT CHANGE UP
AST SERPL-CCNC: 17 U/L — SIGNIFICANT CHANGE UP (ref 15–37)
BASOPHILS # BLD AUTO: 0 K/UL — SIGNIFICANT CHANGE UP (ref 0–0.2)
BASOPHILS NFR BLD AUTO: 0 % — SIGNIFICANT CHANGE UP (ref 0–2)
BILIRUB SERPL-MCNC: 0.6 MG/DL — SIGNIFICANT CHANGE UP (ref 0.2–1.2)
BUN SERPL-MCNC: 20 MG/DL — SIGNIFICANT CHANGE UP (ref 7–23)
C TRACH RRNA SPEC QL NAA+PROBE: SIGNIFICANT CHANGE UP
CALCIUM SERPL-MCNC: 8.4 MG/DL — LOW (ref 8.5–10.1)
CHLORIDE SERPL-SCNC: 101 MMOL/L — SIGNIFICANT CHANGE UP (ref 96–108)
CO2 SERPL-SCNC: 22 MMOL/L — SIGNIFICANT CHANGE UP (ref 22–31)
CREAT SERPL-MCNC: 1.17 MG/DL — SIGNIFICANT CHANGE UP (ref 0.5–1.3)
CULTURE RESULTS: SIGNIFICANT CHANGE UP
EGFR: 88 ML/MIN/1.73M2 — SIGNIFICANT CHANGE UP
EOSINOPHIL # BLD AUTO: 0 K/UL — SIGNIFICANT CHANGE UP (ref 0–0.5)
EOSINOPHIL NFR BLD AUTO: 0 % — SIGNIFICANT CHANGE UP (ref 0–6)
ESTIMATED AVERAGE GLUCOSE: 326 MG/DL — HIGH (ref 68–114)
GLUCOSE BLDC GLUCOMTR-MCNC: 312 MG/DL — HIGH (ref 70–99)
GLUCOSE SERPL-MCNC: 316 MG/DL — HIGH (ref 70–99)
HCT VFR BLD CALC: 39.1 % — SIGNIFICANT CHANGE UP (ref 39–50)
HCT VFR BLD CALC: 41 % — SIGNIFICANT CHANGE UP (ref 39–50)
HGB BLD-MCNC: 13.2 G/DL — SIGNIFICANT CHANGE UP (ref 13–17)
HGB BLD-MCNC: 13.8 G/DL — SIGNIFICANT CHANGE UP (ref 13–17)
LYMPHOCYTES # BLD AUTO: 1.09 K/UL — SIGNIFICANT CHANGE UP (ref 1–3.3)
LYMPHOCYTES # BLD AUTO: 8 % — LOW (ref 13–44)
MAGNESIUM SERPL-MCNC: 2.1 MG/DL — SIGNIFICANT CHANGE UP (ref 1.6–2.6)
MANUAL SMEAR VERIFICATION: SIGNIFICANT CHANGE UP
MCHC RBC-ENTMCNC: 28.9 PG — SIGNIFICANT CHANGE UP (ref 27–34)
MCHC RBC-ENTMCNC: 29 PG — SIGNIFICANT CHANGE UP (ref 27–34)
MCHC RBC-ENTMCNC: 33.7 G/DL — SIGNIFICANT CHANGE UP (ref 32–36)
MCHC RBC-ENTMCNC: 33.8 G/DL — SIGNIFICANT CHANGE UP (ref 32–36)
MCV RBC AUTO: 85.8 FL — SIGNIFICANT CHANGE UP (ref 80–100)
MCV RBC AUTO: 85.9 FL — SIGNIFICANT CHANGE UP (ref 80–100)
METAMYELOCYTES # FLD: 2 % — HIGH (ref 0–0)
MONOCYTES # BLD AUTO: 1.23 K/UL — HIGH (ref 0–0.9)
MONOCYTES NFR BLD AUTO: 9 % — SIGNIFICANT CHANGE UP (ref 2–14)
MRSA PCR RESULT.: SIGNIFICANT CHANGE UP
N GONORRHOEA RRNA SPEC QL NAA+PROBE: SIGNIFICANT CHANGE UP
NEUTROPHILS # BLD AUTO: 11.05 K/UL — HIGH (ref 1.8–7.4)
NEUTROPHILS NFR BLD AUTO: 78 % — HIGH (ref 43–77)
NEUTS BAND # BLD: 3 % — SIGNIFICANT CHANGE UP (ref 0–8)
NRBC # BLD: 0 /100 WBCS — SIGNIFICANT CHANGE UP (ref 0–0)
NRBC # BLD: 0 /100 WBCS — SIGNIFICANT CHANGE UP (ref 0–0)
NRBC # BLD: SIGNIFICANT CHANGE UP /100 WBCS (ref 0–0)
PHOSPHATE SERPL-MCNC: 3.1 MG/DL — SIGNIFICANT CHANGE UP (ref 2.5–4.5)
PLAT MORPH BLD: NORMAL — SIGNIFICANT CHANGE UP
PLATELET # BLD AUTO: 158 K/UL — SIGNIFICANT CHANGE UP (ref 150–400)
PLATELET # BLD AUTO: 162 K/UL — SIGNIFICANT CHANGE UP (ref 150–400)
POIKILOCYTOSIS BLD QL AUTO: SLIGHT — SIGNIFICANT CHANGE UP
POTASSIUM SERPL-MCNC: 3.7 MMOL/L — SIGNIFICANT CHANGE UP (ref 3.5–5.3)
POTASSIUM SERPL-SCNC: 3.7 MMOL/L — SIGNIFICANT CHANGE UP (ref 3.5–5.3)
PROT SERPL-MCNC: 7.1 GM/DL — SIGNIFICANT CHANGE UP (ref 6–8.3)
RBC # BLD: 4.55 M/UL — SIGNIFICANT CHANGE UP (ref 4.2–5.8)
RBC # BLD: 4.78 M/UL — SIGNIFICANT CHANGE UP (ref 4.2–5.8)
RBC # FLD: 12.9 % — SIGNIFICANT CHANGE UP (ref 10.3–14.5)
RBC # FLD: 13.2 % — SIGNIFICANT CHANGE UP (ref 10.3–14.5)
RBC BLD AUTO: ABNORMAL
S AUREUS DNA NOSE QL NAA+PROBE: DETECTED
SODIUM SERPL-SCNC: 134 MMOL/L — LOW (ref 135–145)
SPECIMEN SOURCE: SIGNIFICANT CHANGE UP
SPECIMEN SOURCE: SIGNIFICANT CHANGE UP
WBC # BLD: 13.64 K/UL — HIGH (ref 3.8–10.5)
WBC # BLD: 16.36 K/UL — HIGH (ref 3.8–10.5)
WBC # FLD AUTO: 13.64 K/UL — HIGH (ref 3.8–10.5)
WBC # FLD AUTO: 16.36 K/UL — HIGH (ref 3.8–10.5)

## 2024-10-07 PROCEDURE — 88304 TISSUE EXAM BY PATHOLOGIST: CPT | Mod: 26

## 2024-10-07 PROCEDURE — 99291 CRITICAL CARE FIRST HOUR: CPT

## 2024-10-07 PROCEDURE — 99232 SBSQ HOSP IP/OBS MODERATE 35: CPT

## 2024-10-07 PROCEDURE — 11004 DBRDMT SKIN XTRNL GENT&PER: CPT

## 2024-10-07 RX ORDER — CEFAZOLIN SODIUM 1 G
2000 VIAL (EA) INJECTION EVERY 8 HOURS
Refills: 0 | Status: DISCONTINUED | OUTPATIENT
Start: 2024-10-07 | End: 2024-10-07

## 2024-10-07 RX ORDER — PIPERACILLIN AND TAZOBACTAM .5; 4 G/20ML; G/20ML
3.38 INJECTION, POWDER, LYOPHILIZED, FOR SOLUTION INTRAVENOUS EVERY 8 HOURS
Refills: 0 | Status: COMPLETED | OUTPATIENT
Start: 2024-10-08 | End: 2024-10-14

## 2024-10-07 RX ORDER — PIPERACILLIN AND TAZOBACTAM .5; 4 G/20ML; G/20ML
3.38 INJECTION, POWDER, LYOPHILIZED, FOR SOLUTION INTRAVENOUS ONCE
Refills: 0 | Status: COMPLETED | OUTPATIENT
Start: 2024-10-07 | End: 2024-10-07

## 2024-10-07 RX ORDER — KETOROLAC TROMETHAMINE 30 MG/ML
15 INJECTION INTRAMUSCULAR; INTRAVENOUS EVERY 6 HOURS
Refills: 0 | Status: DISCONTINUED | OUTPATIENT
Start: 2024-10-07 | End: 2024-10-07

## 2024-10-07 RX ORDER — PANTOPRAZOLE SODIUM 40 MG/1
40 TABLET, DELAYED RELEASE ORAL DAILY
Refills: 0 | Status: DISCONTINUED | OUTPATIENT
Start: 2024-10-07 | End: 2024-10-15

## 2024-10-07 RX ORDER — CLINDAMYCIN PHOSPHATE 150 MG/ML
VIAL (ML) INJECTION
Refills: 0 | Status: DISCONTINUED | OUTPATIENT
Start: 2024-10-07 | End: 2024-10-09

## 2024-10-07 RX ORDER — INSULIN LISPRO 100/ML
VIAL (ML) SUBCUTANEOUS
Refills: 0 | Status: DISCONTINUED | OUTPATIENT
Start: 2024-10-07 | End: 2024-10-22

## 2024-10-07 RX ORDER — VANCOMYCIN HYDROCHLORIDE 50 MG/ML
1750 KIT ORAL EVERY 12 HOURS
Refills: 0 | Status: DISCONTINUED | OUTPATIENT
Start: 2024-10-07 | End: 2024-10-08

## 2024-10-07 RX ORDER — KETOROLAC TROMETHAMINE 30 MG/ML
30 INJECTION INTRAMUSCULAR; INTRAVENOUS ONCE
Refills: 0 | Status: DISCONTINUED | OUTPATIENT
Start: 2024-10-07 | End: 2024-10-07

## 2024-10-07 RX ORDER — INSULIN LISPRO 100/ML
VIAL (ML) SUBCUTANEOUS AT BEDTIME
Refills: 0 | Status: DISCONTINUED | OUTPATIENT
Start: 2024-10-07 | End: 2024-10-22

## 2024-10-07 RX ORDER — CLINDAMYCIN PHOSPHATE 150 MG/ML
900 VIAL (ML) INJECTION EVERY 8 HOURS
Refills: 0 | Status: DISCONTINUED | OUTPATIENT
Start: 2024-10-07 | End: 2024-10-09

## 2024-10-07 RX ORDER — CHLORHEXIDINE GLUCONATE 40 MG/ML
1 SOLUTION TOPICAL
Refills: 0 | Status: DISCONTINUED | OUTPATIENT
Start: 2024-10-07 | End: 2024-10-22

## 2024-10-07 RX ORDER — ACETAMINOPHEN 500 MG
1000 TABLET ORAL ONCE
Refills: 0 | Status: COMPLETED | OUTPATIENT
Start: 2024-10-07 | End: 2024-10-07

## 2024-10-07 RX ORDER — CLINDAMYCIN PHOSPHATE 150 MG/ML
900 VIAL (ML) INJECTION ONCE
Refills: 0 | Status: COMPLETED | OUTPATIENT
Start: 2024-10-07 | End: 2024-10-07

## 2024-10-07 RX ORDER — MORPHINE SULFATE 30 MG/1
1 TABLET, EXTENDED RELEASE ORAL EVERY 6 HOURS
Refills: 0 | Status: DISCONTINUED | OUTPATIENT
Start: 2024-10-07 | End: 2024-10-07

## 2024-10-07 RX ORDER — VANCOMYCIN HYDROCHLORIDE 50 MG/ML
KIT ORAL
Refills: 0 | Status: DISCONTINUED | OUTPATIENT
Start: 2024-10-07 | End: 2024-10-07

## 2024-10-07 RX ORDER — OXYCODONE HYDROCHLORIDE 30 MG/1
5 TABLET ORAL EVERY 6 HOURS
Refills: 0 | Status: DISCONTINUED | OUTPATIENT
Start: 2024-10-07 | End: 2024-10-14

## 2024-10-07 RX ORDER — ENOXAPARIN SODIUM 40MG/0.4ML
40 SYRINGE (ML) SUBCUTANEOUS EVERY 12 HOURS
Refills: 0 | Status: DISCONTINUED | OUTPATIENT
Start: 2024-10-07 | End: 2024-10-22

## 2024-10-07 RX ADMIN — Medication 400 MILLIGRAM(S): at 05:31

## 2024-10-07 RX ADMIN — Medication 100 MILLIGRAM(S): at 21:57

## 2024-10-07 RX ADMIN — MORPHINE SULFATE 1 MILLIGRAM(S): 30 TABLET, EXTENDED RELEASE ORAL at 06:01

## 2024-10-07 RX ADMIN — Medication 100 MILLIGRAM(S): at 07:00

## 2024-10-07 RX ADMIN — KETOROLAC TROMETHAMINE 15 MILLIGRAM(S): 30 INJECTION INTRAMUSCULAR; INTRAVENOUS at 09:30

## 2024-10-07 RX ADMIN — CHLORHEXIDINE GLUCONATE 1 APPLICATION(S): 40 SOLUTION TOPICAL at 17:30

## 2024-10-07 RX ADMIN — KETOROLAC TROMETHAMINE 30 MILLIGRAM(S): 30 INJECTION INTRAMUSCULAR; INTRAVENOUS at 01:17

## 2024-10-07 RX ADMIN — MORPHINE SULFATE 1 MILLIGRAM(S): 30 TABLET, EXTENDED RELEASE ORAL at 13:35

## 2024-10-07 RX ADMIN — VANCOMYCIN HYDROCHLORIDE 250 MILLIGRAM(S): KIT at 18:21

## 2024-10-07 RX ADMIN — Medication 4: at 23:58

## 2024-10-07 RX ADMIN — KETOROLAC TROMETHAMINE 15 MILLIGRAM(S): 30 INJECTION INTRAMUSCULAR; INTRAVENOUS at 08:30

## 2024-10-07 RX ADMIN — Medication 40 MILLIGRAM(S): at 05:01

## 2024-10-07 RX ADMIN — Medication 1000 MILLIGRAM(S): at 06:31

## 2024-10-07 RX ADMIN — Medication 100 MILLIGRAM(S): at 17:29

## 2024-10-07 RX ADMIN — PIPERACILLIN AND TAZOBACTAM 200 GRAM(S): .5; 4 INJECTION, POWDER, LYOPHILIZED, FOR SOLUTION INTRAVENOUS at 17:29

## 2024-10-07 RX ADMIN — MORPHINE SULFATE 1 MILLIGRAM(S): 30 TABLET, EXTENDED RELEASE ORAL at 05:01

## 2024-10-07 RX ADMIN — Medication 1000 MILLIGRAM(S): at 18:22

## 2024-10-07 RX ADMIN — Medication 400 MILLIGRAM(S): at 18:12

## 2024-10-07 RX ADMIN — PIPERACILLIN AND TAZOBACTAM 25 GRAM(S): .5; 4 INJECTION, POWDER, LYOPHILIZED, FOR SOLUTION INTRAVENOUS at 21:57

## 2024-10-07 RX ADMIN — KETOROLAC TROMETHAMINE 30 MILLIGRAM(S): 30 INJECTION INTRAMUSCULAR; INTRAVENOUS at 00:17

## 2024-10-07 RX ADMIN — Medication 100 MILLIGRAM(S): at 06:11

## 2024-10-07 RX ADMIN — MORPHINE SULFATE 1 MILLIGRAM(S): 30 TABLET, EXTENDED RELEASE ORAL at 12:33

## 2024-10-07 NOTE — CONSULT NOTE ADULT - SUBJECTIVE AND OBJECTIVE BOX
CHIEF COMPLAINT: testicular pain     HPI:  Jose Low Jr. is a 24 year old male with no significant PMHx who presented to the ED on 10/6/24 for complaints of left testicular pain.    Patient reports he has been having left testicular pain since Thursday. Described as a burning sensation and severe pain to the point where he is unable to eat or sleep. Severity was 10/10. Did not take any analgesics for the pain. No trauma to area. Not currently sexually active and has never been. Came to the ER on 10/4/24 for further evaluation. WBC 17.47K at that time. U/A with trace ketones, proteinuria, negative leuks, negative nitrites, WBC 0, RBC 1, occasional bacteria, glucosuria. U/S scrotum and testicles without evidence of testicular torsion but with marked left scrotal wall thickening, edema, and mild hyperemia of left epididymal body and tail. Received ceftriaxone 500 mg IV x 1. Discharged home with prescription for doxycycline 100 mg BID. Spiking fevers with Tmax 101 and severe pain despite taking doxycycline and tylenol which prompted another visit to ER.     In the ED, Tmax 101.9, HR as elevated as 132, BP as elevated as 153/98. WBC 18.92K, blood glucose 340. U/A with ketonuria, proteinuria, negative nitrites, negative leuks, small blood, WBC 3, RBC 5, few bacteria, glucosuria. CT A/P with diffuse edema involving the scrotal skin with mild perineal extension. No focal gas collections. Received LR 2500 cc bolus, vancomycin 1500 mg IV, zosyn 3.375 g IV, clindamycin 900 mg IV, acetaminophen 975 mg PO, and morphine 4 mg IV. Evaluated by urology who recommended admission for IV antibiotics and does not recommend acute urologic intervention. (06 Oct 2024 20:16)    Brief Hospital Course:  Pt admitted to medicine service for sepsis likely from a scrotal abscess. Urology following and decision was made to take pt to OR for I&D scrotal abscess. 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 Minimal EBL, given 1L IVF. No pressors administered. Pt extubated post-op with no complications. Transferred to ICU for hemodynamic monitoring.       PAST MEDICAL & SURGICAL HISTORY:  No pertinent past medical history          FAMILY HISTORY:      SOCIAL HISTORY:  Smoking:denies    Allergies    No Known Allergies    Intolerances        HOME MEDICATIONS:    REVIEW OF SYSTEMS:  [x ] All other systems negative    OBJECTIVE:  ICU Vital Signs Last 24 Hrs  T(C): 37.6 (07 Oct 2024 14:11), Max: 39.8 (07 Oct 2024 05:28)  T(F): 99.6 (07 Oct 2024 14:11), Max: 103.7 (07 Oct 2024 05:28)  HR: 100 (07 Oct 2024 14:11) (100 - 122)  BP: 121/72 (07 Oct 2024 14:11) (121/72 - 165/81)  BP(mean): --  ABP: --  ABP(mean): --  RR: 17 (07 Oct 2024 11:11) (17 - 20)  SpO2: 98% (07 Oct 2024 14:11) (94% - 98%)    O2 Parameters below as of 07 Oct 2024 11:11  Patient On (Oxygen Delivery Method): room air              10-06 @ 07:01  -  10-07 @ 07:00  --------------------------------------------------------  IN: 350 mL / OUT: 2900 mL / NET: -2550 mL      CAPILLARY BLOOD GLUCOSE          PHYSICAL EXAM:  GENERAL: NAD, lying in bed comfortably  ENT: B/L tonsillar enlargement  HEART: Regular rate and rhythm  LUNGS: Unlabored respirations.  Clear to auscultation bilaterally, no crackles, wheezing, or rhonchi  ABDOMEN: Soft, nontender, nondistended  EXTREMITIES: No clubbing, cyanosis, or edema  NERVOUS SYSTEM:  A&Ox3    HOSPITAL MEDICATIONS:  MEDICATIONS  (STANDING):  acetaminophen   IVPB .. 1000 milliGRAM(s) IV Intermittent once  chlorhexidine 2% Cloths 1 Application(s) Topical <User Schedule>  clindamycin IVPB 900 milliGRAM(s) IV Intermittent every 8 hours  clindamycin IVPB      influenza   Vaccine 0.5 milliLiter(s) IntraMuscular once  pantoprazole  Injectable 40 milliGRAM(s) IV Push daily  piperacillin/tazobactam IVPB.- 3.375 Gram(s) IV Intermittent once  vancomycin  IVPB 1750 milliGRAM(s) IV Intermittent every 12 hours    MEDICATIONS  (PRN):      LABS:                        13.8   16.36 )-----------( 158      ( 07 Oct 2024 08:07 )             41.0     10-06    135  |  103  |  13  ----------------------------<  340[H]  4.1   |  23  |  1.05    Ca    9.7      06 Oct 2024 14:28    TPro  8.2  /  Alb  3.1[L]  /  TBili  1.1  /  DBili  x   /  AST  15  /  ALT  33  /  AlkPhos  100  10-06    PT/INR - ( 06 Oct 2024 14:28 )   PT: 14.5 sec;   INR: 1.29 ratio         PTT - ( 06 Oct 2024 14:28 )  PTT:33.9 sec  Urinalysis Basic - ( 06 Oct 2024 14:51 )    Color: Yellow / Appearance: Clear / SG: >1.030 / pH: x  Gluc: x / Ketone: 15 mg/dL  / Bili: Negative / Urobili: 1.0 mg/dL   Blood: x / Protein: 100 mg/dL / Nitrite: Negative   Leuk Esterase: Negative / RBC: 5 /HPF / WBC 3 /HPF   Sq Epi: x / Non Sq Epi: x / Bacteria: Few /HPF            MICROBIOLOGY:     RADIOLOGY:  [x ] Reviewed and interpreted by me

## 2024-10-07 NOTE — BRIEF OPERATIVE NOTE - NSICDXBRIEFPROCEDURE_GEN_ALL_CORE_FT
PROCEDURES:  Scrotal exploration 07-Oct-2024 16:54:30  Barbi Lopez  Irrigation and excisional debridement of more than 20 sq cm of tissue including muscle or fascia 07-Oct-2024 16:55:21  Barbi Lopez

## 2024-10-07 NOTE — PROGRESS NOTE ADULT - SUBJECTIVE AND OBJECTIVE BOX
Post-op check    S/P scrotal exploration, excision/debridement paul's gangrene POD#0  Pt seen and examined at bedside. Post-op pain well controlled on pain medication. Denies chest pain, shortness of breath, nausea/ vomiting, and dizziness.     Vital Signs Last 24 Hrs  T(F): 99.5 (10-07-24 @ 21:00), Max: 103.7 (10-07-24 @ 05:28)  HR: 86 (10-07-24 @ 21:00)  BP: 117/77 (10-07-24 @ 21:00)  RR: 23 (10-07-24 @ 21:00)  SpO2: 99% (10-07-24 @ 21:00)    CONSTITUTIONAL: Alert, NAD  RESPIRATORY: Clear to auscultation bilaterally, respirations nonlabored  CARDIOVASCULAR: S1S2, Regular rate and rhythm  GASTROINTESTINAL: soft NTND  : Scrotal dressing clean/dry/intact. Li indwelling with clear yellow urine  MUSCULOSKELETAL: No calf tenderness, No edema      Assessment: 24M with Paul's gangrene S/P scrotal exploration, excision/debridement of paul's gangrene POD#0    Plan:  - local wound care  - DVT prophylaxis, Incentive Spirometer, OOB, Ambulating, pain control  - Continue antibiotics: Vanco/Zosyn/Clinda  - Li, monitor urine output  - f/u labs   - continue current management per ICU Post-op check    S/P scrotal exploration, excision/debridement paul's gangrene POD#0  Pt seen and examined at bedside. Post-op pain well controlled on pain medication. Denies chest pain, shortness of breath, nausea/ vomiting, and dizziness.     Vital Signs Last 24 Hrs  T(F): 99.5 (10-07-24 @ 21:00), Max: 103.7 (10-07-24 @ 05:28)  HR: 86 (10-07-24 @ 21:00)  BP: 117/77 (10-07-24 @ 21:00)  RR: 23 (10-07-24 @ 21:00)  SpO2: 99% (10-07-24 @ 21:00)    CONSTITUTIONAL: Alert, NAD  RESPIRATORY: Clear to auscultation bilaterally, respirations nonlabored  CARDIOVASCULAR: S1S2, Regular rate and rhythm  GASTROINTESTINAL: soft NTND  : Scrotal dressing clean/dry/intact. Li indwelling with clear yellow urine  MUSCULOSKELETAL: No calf tenderness, No edema      Assessment: 24M with Paul's gangrene S/P scrotal exploration, excision/debridement of paul's gangrene POD#0    Plan:  - local wound care  - DVT prophylaxis, Incentive Spirometer, OOB, Ambulating, pain control  - Continue antibiotics: Vanco/Zosyn/Clinda  - Li, monitor urine output  - f/u labs   - Recommend hyperbaric oxygen therapy   - continue current management per ICU

## 2024-10-07 NOTE — CONSULT NOTE ADULT - ASSESSMENT
24 year old male with no significant PMHx who presented to the ED on 10/6/24 for complaints of left testicular pain. Pt admitted to medicine service for sepsis likely from a scrotal abscess. Urology following and decision was made to take pt to OR for I&D scrotal abscess. 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 Minimal EBL, given 1L IVF. No pressors administered. Pt extubated post-op with no complications. Transferred to ICU for hemodynamic monitoring.     # Neuro:  -A/Ox3  - pain control: PRN    #Resp:  - ?ETHAN will use NIV as needed   -satting adequately on supplemental oxygen, maintain sats>92%   - incentive spirometry     #CV:  -HD stable without vasopressor requirements  - MAP goal>65    #GI:  -Diet: will start clear liquid diet   - GI ppx: PPI  - Bowel regimen    #Renal:  - fluids/IVL: s/p 1L IVF intra-op  - dewitt, cont to monitor strict I/Os  - replete lytes as appropriate     #ID:  //Sepsis likely in the setting of paul's gangrene from scrotal abscess   - CT abd/pelvis 10/4: Diffuse edema involving the scrotal skin with mild perineal extension. No focal gas collections.  - febrile with leukocytosis  - Tylenol prn for fevers, trend fever curve  - f/u infectious workup: bcxs/ucx  - empiric tx with vanc/zosyn/clinda  - will consult ID for abx management  - urology following, will appreciate recs    #Heme:  //DVT ppx: SCDs for now  - cbc stable    #Endo:  - maintaining goal glucose<180 with ISS  - cont to monitor FS    Case discussed with ICU attending.

## 2024-10-07 NOTE — CONSULT NOTE ADULT - CRITICAL CARE ATTENDING COMMENT
Mr. 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  -Pain control with tylenol, oxycodone, IV dilaudid prn.  -Pt with severely enlarged adenoids for which is being worked up for surgery. Continue with nasal cannula prn as pt desaturates when he sleeps. Likely component of ETHAN.  -Tachycardic, likely reactive to fever. BP WNL for now, but will continue to monitor as pt at high risk for decompensation.  -Liquid diet. Bowel regimen prn.  -A1c is 13. New diabetes diagnosis. Will send c peptide, zinc transporter 8 antibody, islet cell ab in AM. Start on sliding scale. Will need endocrine consult in the morning.  -Will cover with vanc, zosyn, and clindamycin for now. Pending ID consult. Cultures pending.  -Lovenox BID for DVT ppx.

## 2024-10-07 NOTE — BRIEF OPERATIVE NOTE - OPERATION/FINDINGS
purulent drainage upon entering scrotum  devitalized tissue excised  slough debrided from b/l scrotum

## 2024-10-07 NOTE — PRE-OP CHECKLIST - WARM FLUIDS/WARM BLANKETS
Common Middle Ear Problems    Your middle ear may have been injured or infected recently. Over time, certain growths or bone disease can also harm the middle ear. Left untreated, middle ear problems often lead to lifelong hearing loss. There are two types of hearing loss: conductive and sensorineural. One or both kinds can occur. Injury, infection, certain growths, or bone disease can cause your symptoms. A ruptured eardrum or a long-lasting (chronic) ear infection may be painful and decrease hearing.  Symptoms    Hearing loss in one or both ears    Fluid, often smelly, draining from the ear    Pain, pressure, or discomfort in the ear    Ringing in the ear  Conductive and sensorineural hearing loss  Sound waves may be disrupted before they reach the inner ear. If this happens, conductive hearing loss may occur. The ear canal can be blocked by wax, infection, a tumor, or a foreign object. The eardrum can be injured or infected. Abnormal bone growth, infection, or tumors in the middle ear can block sound waves.  Sound waves may not be processed correctly in the inner ear. If this happens, sensorineural hearing loss may occur. Permanent hearing loss is most commonly associated with sensorineural problems.  The tests and evaluations used to diagnose what type of hearing problem you have will depend on your symptoms.   Date Last Reviewed: 10/1/2016    2104-1045 [employer]. [address] [place] [address], [place] [address]. All rights reserved. This information is not intended as a substitute for professional medical care. Always follow your healthcare professional's instructions.          Otitis Media (Middle-Ear Infection) in Adults  Otitis media is another name for a middle-ear infection. It means an infection behind your eardrum. This kind of ear infection can happen after any condition that keeps fluid from draining from the middle ear. These conditions include allergies, a cold, a sore throat, or a  respiratory infection.  Middle-ear infections are common in children, but they can also happen in adults. An ear infection in an adult may mean a more serious problem than in a child. So you may need additional tests. If you have an ear infection, you should see your health care provider for treatment.  What are the types of middle-ear infections?  Infections can affect the middle ear in several ways. They are:    Acute otitis media. This middle-ear infection occurs suddenly. It causes swelling and redness. Fluid and mucus become trapped inside the ear. You can have a fever and ear pain.    Otitis media with effusion. Fluid (effusion) and mucus build up in the middle ear after the infection goes away. You may feel like your middle ear is full. This can continue for months and may affect your hearing.    Chronic otitis media with effusion. Fluid (effusion) remains in the middle ear for a long time. Or it builds up again and again, even though there is no infection. This type of middle-ear infection may be hard to treat. It may also affect your hearing.  Who is more likely to get a middle-ear infection?  You are more likely to get an ear infection if you:    Smoke or are around someone who smokes    Have seasonal or year-round allergy symptoms    Have a cold or other upper respiratory infection  What causes a middle-ear infection?  The middle ear connects to the throat by a canal called the eustachian tube. This tube helps even out the pressure between the outer ear and the inner ear. A cold or allergy can irritate the tube or cause the area around it to swell. This can keep fluid from draining from the middle ear. The fluid builds up behind the eardrum. Bacteria and viruses can grow in this fluid. The bacteria and viruses cause the middle-ear infection.  What are the symptoms of a middle-ear infection?  Common symptoms of a middle-ear infection in adults are:    Pain in 1 or both ears    Drainage from the  ear    Muffled hearing    Sore throat   You may also have a fever. Rarely, your balance can be affected.  These symptoms may be the same as for other conditions. It s important to talk with your health care provider if you think you have a middle-ear infection. If you have a high fever, severe pain behind your ear, or paralysis in your face, see your provider as soon as you can.  How is a middle-ear infection diagnosed?  Your health care provider will take a medical history and do a physical exam. He or she will look at the outer ear and eardrum with an otoscope. The otoscope is a lighted tool that lets your provider see inside the ear. A pneumatic otoscope blows a puff of air into the ear to check how well your eardrum moves. If you eardrum doesn t move well, it may mean you have fluid behind it.  Your provider may also do a test called tympanometry. This test tells how well the middle ear is working. It can find any changes in pressure in the middle ear. Your provider may test your hearing with a tuning fork.  How is a middle-ear infection treated?  A middle-ear infection may be treated with:    Antibiotics, taken by mouth or as ear drops    Medication for pain    Decongestants, antihistamines, or nasal steroids  Your health care provider may also have you try autoinsufflation. This helps adjust the air pressure in your ear. For this, you pinch your nose and gently exhale. This forces air back through the eustachian tube.  The exact treatment for your ear infection will depend on the type of infection you have. In general, if your symptoms don t get better in 48 to 72 hours, contact your health care provider.  Middle-ear infections can cause long-term problems if not treated. They can lead to:    Infection in other parts of the head    Permanent hearing loss    Paralysis of a nerve in your face  If you have a middle-ear infection that doesn t get better, you may need to see an ear, nose, and throat specialist  (otolaryngologist). You may need a CT scan or MRI to check for head and neck cancer.  Ear tubes  Sometimes fluid stays in the middle ear even after you take antibiotics and the infection goes away. In this case, your health care provider may suggest that a small tube be placed in your ear. The tube is put at the opening of the eardrum. The tube keeps fluid from building up and relieves pressure in the middle ear. It can also help you hear better. This surgery is called myringotomy. It is not often done in adults.  The tubes usually fall out on their own after 6 months to a year.    1886-4757 [employer]. [address] [place] [address], [place] [address]. All rights reserved. This information is not intended as a substitute for professional medical care. Always follow your healthcare professional's instructions.         no

## 2024-10-07 NOTE — PROGRESS NOTE ADULT - SUBJECTIVE AND OBJECTIVE BOX
HOD # 1    SUBJECTIVE:  23 y/o M seen and examined at bedside. Pt with no acute overnight events. PT c/o scrotal "swelling" and pain however in NAD. PT voiding with no complaints. PT denies chest pain, sob, palpitations, melena or hematochezia     Vital Signs Last 24 Hrs  T(C): 37.6 (07 Oct 2024 11:11), Max: 39.8 (07 Oct 2024 05:28)  T(F): 99.6 (07 Oct 2024 11:11), Max: 103.7 (07 Oct 2024 05:28)  HR: 100 (07 Oct 2024 11:11) (100 - 132)  BP: 121/72 (07 Oct 2024 11:11) (121/72 - 165/81)  BP(mean): --  RR: 17 (07 Oct 2024 11:11) (17 - 20)  SpO2: 98% (07 Oct 2024 11:11) (94% - 98%)    Parameters below as of 07 Oct 2024 11:11  Patient On (Oxygen Delivery Method): room air    PHYSICAL EXAM:  GENERAL: No acute distress, well-developed  ABDOMEN: Soft, non-tender, non-distended  : scrotal swelling, induration, ttp, uncircumcised phallus,   NEUROLOGY: responding appropriately, no focal deficits    I&O's Summary    06 Oct 2024 07:01  -  07 Oct 2024 07:00  --------------------------------------------------------  IN: 350 mL / OUT: 2900 mL / NET: -2550 mL      I&O's Detail    06 Oct 2024 07:01  -  07 Oct 2024 07:00  --------------------------------------------------------  IN:    IV PiggyBack: 50 mL    IV PiggyBack: 200 mL    IV PiggyBack: 100 mL  Total IN: 350 mL    OUT:    Voided (mL): 2900 mL  Total OUT: 2900 mL    Total NET: -2550 mL        MEDICATIONS  (STANDING):  ceFAZolin   IVPB 2000 milliGRAM(s) IV Intermittent every 8 hours  clindamycin IVPB 900 milliGRAM(s) IV Intermittent every 8 hours  enoxaparin Injectable 40 milliGRAM(s) SubCutaneous every 12 hours  influenza   Vaccine 0.5 milliLiter(s) IntraMuscular once    MEDICATIONS  (PRN):  acetaminophen     Tablet .. 650 milliGRAM(s) Oral every 6 hours PRN Temp greater or equal to 38C (100.4F), Mild Pain (1 - 3)  aluminum hydroxide/magnesium hydroxide/simethicone Suspension 30 milliLiter(s) Oral every 4 hours PRN Dyspepsia  ketorolac   Injectable 15 milliGRAM(s) IV Push every 6 hours PRN Moderate Pain (4 - 6)  melatonin 3 milliGRAM(s) Oral at bedtime PRN Insomnia  morphine  - Injectable 1 milliGRAM(s) IV Push every 6 hours PRN Severe Pain (7 - 10)  ondansetron Injectable 4 milliGRAM(s) IV Push every 8 hours PRN Nausea and/or Vomiting    LABS:                        13.8   16.36 )-----------( 158      ( 07 Oct 2024 08:07 )             41.0     10-06    135  |  103  |  13  ----------------------------<  340[H]  4.1   |  23  |  1.05    Ca    9.7      06 Oct 2024 14:28    TPro  8.2  /  Alb  3.1[L]  /  TBili  1.1  /  DBili  x   /  AST  15  /  ALT  33  /  AlkPhos  100  10-06    PT/INR - ( 06 Oct 2024 14:28 )   PT: 14.5 sec;   INR: 1.29 ratio         PTT - ( 06 Oct 2024 14:28 )  PTT:33.9 sec  Urinalysis Basic - ( 06 Oct 2024 14:51 )    Color: Yellow / Appearance: Clear / SG: >1.030 / pH: x  Gluc: x / Ketone: 15 mg/dL  / Bili: Negative / Urobili: 1.0 mg/dL   Blood: x / Protein: 100 mg/dL / Nitrite: Negative   Leuk Esterase: Negative / RBC: 5 /HPF / WBC 3 /HPF   Sq Epi: x / Non Sq Epi: x / Bacteria: Few /HPF    ASSESSMENT  23 y/o M HOD # 1 admitted with scrotal cellulitis , tmax of 103.7F overnight, wbc improved  to 16.36 (18.92), ucx normal prieto,     PLAN  - cont iv abx, ivf  - cont pain control, supportive measures  - oob, ambulation as tolerated  - cont care per primary team  - f/u am labs, trend wbc  - serial abd exams HOD # 1    SUBJECTIVE:  25 y/o M seen and examined at bedside. Pt with no acute overnight events. PT c/o scrotal "swelling" and pain however in NAD. PT voiding with no complaints. PT denies chest pain, sob, palpitations, melena or hematochezia     Vital Signs Last 24 Hrs  T(C): 37.6 (07 Oct 2024 11:11), Max: 39.8 (07 Oct 2024 05:28)  T(F): 99.6 (07 Oct 2024 11:11), Max: 103.7 (07 Oct 2024 05:28)  HR: 100 (07 Oct 2024 11:11) (100 - 132)  BP: 121/72 (07 Oct 2024 11:11) (121/72 - 165/81)  BP(mean): --  RR: 17 (07 Oct 2024 11:11) (17 - 20)  SpO2: 98% (07 Oct 2024 11:11) (94% - 98%)    Parameters below as of 07 Oct 2024 11:11  Patient On (Oxygen Delivery Method): room air    PHYSICAL EXAM:  GENERAL: No acute distress, well-developed  ABDOMEN: Soft, non-tender, non-distended  : scrotal swelling, induration, ttp, uncircumcised phallus,   NEUROLOGY: responding appropriately, no focal deficits    I&O's Summary    06 Oct 2024 07:01  -  07 Oct 2024 07:00  --------------------------------------------------------  IN: 350 mL / OUT: 2900 mL / NET: -2550 mL      I&O's Detail    06 Oct 2024 07:01  -  07 Oct 2024 07:00  --------------------------------------------------------  IN:    IV PiggyBack: 50 mL    IV PiggyBack: 200 mL    IV PiggyBack: 100 mL  Total IN: 350 mL    OUT:    Voided (mL): 2900 mL  Total OUT: 2900 mL    Total NET: -2550 mL        MEDICATIONS  (STANDING):  ceFAZolin   IVPB 2000 milliGRAM(s) IV Intermittent every 8 hours  clindamycin IVPB 900 milliGRAM(s) IV Intermittent every 8 hours  enoxaparin Injectable 40 milliGRAM(s) SubCutaneous every 12 hours  influenza   Vaccine 0.5 milliLiter(s) IntraMuscular once    MEDICATIONS  (PRN):  acetaminophen     Tablet .. 650 milliGRAM(s) Oral every 6 hours PRN Temp greater or equal to 38C (100.4F), Mild Pain (1 - 3)  aluminum hydroxide/magnesium hydroxide/simethicone Suspension 30 milliLiter(s) Oral every 4 hours PRN Dyspepsia  ketorolac   Injectable 15 milliGRAM(s) IV Push every 6 hours PRN Moderate Pain (4 - 6)  melatonin 3 milliGRAM(s) Oral at bedtime PRN Insomnia  morphine  - Injectable 1 milliGRAM(s) IV Push every 6 hours PRN Severe Pain (7 - 10)  ondansetron Injectable 4 milliGRAM(s) IV Push every 8 hours PRN Nausea and/or Vomiting    LABS:                        13.8   16.36 )-----------( 158      ( 07 Oct 2024 08:07 )             41.0     10-06    135  |  103  |  13  ----------------------------<  340[H]  4.1   |  23  |  1.05    Ca    9.7      06 Oct 2024 14:28    TPro  8.2  /  Alb  3.1[L]  /  TBili  1.1  /  DBili  x   /  AST  15  /  ALT  33  /  AlkPhos  100  10-06    PT/INR - ( 06 Oct 2024 14:28 )   PT: 14.5 sec;   INR: 1.29 ratio         PTT - ( 06 Oct 2024 14:28 )  PTT:33.9 sec  Urinalysis Basic - ( 06 Oct 2024 14:51 )    Color: Yellow / Appearance: Clear / SG: >1.030 / pH: x  Gluc: x / Ketone: 15 mg/dL  / Bili: Negative / Urobili: 1.0 mg/dL   Blood: x / Protein: 100 mg/dL / Nitrite: Negative   Leuk Esterase: Negative / RBC: 5 /HPF / WBC 3 /HPF   Sq Epi: x / Non Sq Epi: x / Bacteria: Few /HPF    ASSESSMENT  25 y/o M HOD # 1 admitted with scrotal cellulitis , tmax of 103.7F overnight, wbc improved  to 16.36 (18.92), ucx normal prieto, with likely scrotal abscess,     PLAN  - NPO added onto OR for I&D scrotal abscess  - cont iv abx, ivf  - f/u PVR and urine cx  - cont pain control, supportive measures  - oob, ambulation as tolerated  - cont care per primary team  - f/u am labs, trend wbc  - serial abd exams

## 2024-10-07 NOTE — PROGRESS NOTE ADULT - SUBJECTIVE AND OBJECTIVE BOX
Patient is a 24y old  Male who presents with a chief complaint of Sepsis secondary to scrotal cellulitis vs. epididymitis (07 Oct 2024 11:37)    INTERVAL HPI/OVERNIGHT EVENTS: Overnight, patient with fever 103.7F and tachy to 110s.     MEDICATIONS  (STANDING):  ceFAZolin   IVPB 2000 milliGRAM(s) IV Intermittent every 8 hours  clindamycin IVPB 900 milliGRAM(s) IV Intermittent every 8 hours  enoxaparin Injectable 40 milliGRAM(s) SubCutaneous every 12 hours  influenza   Vaccine 0.5 milliLiter(s) IntraMuscular once    MEDICATIONS  (PRN):  acetaminophen     Tablet .. 650 milliGRAM(s) Oral every 6 hours PRN Temp greater or equal to 38C (100.4F), Mild Pain (1 - 3)  aluminum hydroxide/magnesium hydroxide/simethicone Suspension 30 milliLiter(s) Oral every 4 hours PRN Dyspepsia  ketorolac   Injectable 15 milliGRAM(s) IV Push every 6 hours PRN Moderate Pain (4 - 6)  melatonin 3 milliGRAM(s) Oral at bedtime PRN Insomnia  morphine  - Injectable 1 milliGRAM(s) IV Push every 6 hours PRN Severe Pain (7 - 10)  ondansetron Injectable 4 milliGRAM(s) IV Push every 8 hours PRN Nausea and/or Vomiting      Allergies    No Known Allergies    Intolerances        REVIEW OF SYSTEMS: all negative with exception of above    Vital Signs Last 24 Hrs  T(C): 37.6 (07 Oct 2024 11:11), Max: 39.8 (07 Oct 2024 05:28)  T(F): 99.6 (07 Oct 2024 11:11), Max: 103.7 (07 Oct 2024 05:28)  HR: 100 (07 Oct 2024 11:11) (100 - 122)  BP: 121/72 (07 Oct 2024 11:11) (121/72 - 165/81)  BP(mean): --  RR: 17 (07 Oct 2024 11:11) (17 - 20)  SpO2: 98% (07 Oct 2024 11:11) (94% - 98%)    Parameters below as of 07 Oct 2024 11:11  Patient On (Oxygen Delivery Method): room air        PHYSICAL EXAM:  CONSTITUTIONAL: Well groomed, uncomfortable appearing  EYES: PERRLA and symmetric, EOMI  ENMT: Oral mucosa with moist membranes  RESP: No respiratory distress, no use of accessory muscles; CTA b/l  CV: RRR  GI: Soft, NT, ND  : scrotum with erythema, diffuse swelling, and tender to palpation, no open wounds or purulent drainage    LABS:                        13.8   16.36 )-----------( 158      ( 07 Oct 2024 08:07 )             41.0     10-06    135  |  103  |  13  ----------------------------<  340[H]  4.1   |  23  |  1.05    Ca    9.7      06 Oct 2024 14:28    TPro  8.2  /  Alb  3.1[L]  /  TBili  1.1  /  DBili  x   /  AST  15  /  ALT  33  /  AlkPhos  100  10-06    PT/INR - ( 06 Oct 2024 14:28 )   PT: 14.5 sec;   INR: 1.29 ratio         PTT - ( 06 Oct 2024 14:28 )  PTT:33.9 sec  Urinalysis Basic - ( 06 Oct 2024 14:51 )    Color: Yellow / Appearance: Clear / SG: >1.030 / pH: x  Gluc: x / Ketone: 15 mg/dL  / Bili: Negative / Urobili: 1.0 mg/dL   Blood: x / Protein: 100 mg/dL / Nitrite: Negative   Leuk Esterase: Negative / RBC: 5 /HPF / WBC 3 /HPF   Sq Epi: x / Non Sq Epi: x / Bacteria: Few /HPF      CAPILLARY BLOOD GLUCOSE          RADIOLOGY & ADDITIONAL TESTS:    Imaging Personally Reviewed:  [ ] YES  [ ] NO    Consultant(s) Notes Reviewed:  [ ] YES  [ ] NO    Care Discussed with Consultants/Other Providers [ ] YES  [ ] NO

## 2024-10-07 NOTE — PROGRESS NOTE ADULT - ASSESSMENT
Jose Low Jr. is a 24 year old male with no significant PMHx who presented to the ED on 10/6/24 for complaints of left testicular pain and admitted for sepsis secondary to scrotal cellulitis vs. epididymitis.    Sepsis secondary to scrotal cellulitis vs. epididymitis  Complaints of L. testicular pain x 4 days, described as a burning sensation  Denies trauma to area, not sexually active  Took 2-day course of doxycycline 100 mg BID without improvement of symptoms and still spiking fevers with Tmax 101 at home  U/A (on 10/4) with trace ketones, proteinuria, negative leuks, negative nitrites, WBC 0, RBC 1, occasional bacteria, glucosuria  Urine culture (on 10/4) with normal urogenital prieto  U/S scrotum and testicles (on 10/4) without evidence of testicular torsion but with marked L. scrotal wall thickening, edema, and mild hyperemia of L. epididymal body and tail  Temp 101.9, , WBC 18.92K on admission  U/A with ketonuria, proteinuria, negative nitrites, negative leuks, small blood, WBC 3, RBC 5, few bacteria, glucosuria  CT A/P with diffuse edema involving the scrotal skin with mild perineal extension, no focal gas collections  S/p LR 2500 cc bolus, vancomycin 1500 mg IV, zosyn 3.375 g IV, clindamycin 900 mg IV, acetaminophen 975 mg PO, and morphine 4 mg IV in the ED  Elevate scrotum, warm compresses, pain management PRN  Clindamycin 900 mg IV q8 continued for now  No acute surgical intervention as per urology  F/u blood cultures, urine culture  Monitor fever curve and WBC trend  ID c/s placed- will c/w Clinda and Ancef  Urology recommendations appreciated- Plan for I/D scrotal abscess  F/u blood and urine cx    Hyperglycemia  Blood glucose 340 on admission  U/A with ketonuria and glucosuria    Elevated blood pressure without diagnosis of HTN, suspect secondary to pain  BP as elevated as 153/98 on admission  Will hold off on initiation of antihypertensives at this time  Anticipate improvement of BP with adequate pain control

## 2024-10-08 LAB
ALBUMIN SERPL ELPH-MCNC: 2.2 G/DL — LOW (ref 3.3–5)
ALP SERPL-CCNC: 84 U/L — SIGNIFICANT CHANGE UP (ref 40–120)
ALT FLD-CCNC: 27 U/L — SIGNIFICANT CHANGE UP (ref 12–78)
ANION GAP SERPL CALC-SCNC: 6 MMOL/L — SIGNIFICANT CHANGE UP (ref 5–17)
AST SERPL-CCNC: 16 U/L — SIGNIFICANT CHANGE UP (ref 15–37)
BILIRUB SERPL-MCNC: 0.6 MG/DL — SIGNIFICANT CHANGE UP (ref 0.2–1.2)
BUN SERPL-MCNC: 19 MG/DL — SIGNIFICANT CHANGE UP (ref 7–23)
C PEPTIDE SERPL-MCNC: 3.7 NG/ML — SIGNIFICANT CHANGE UP (ref 1.1–4.4)
CALCIUM SERPL-MCNC: 8.6 MG/DL — SIGNIFICANT CHANGE UP (ref 8.5–10.1)
CHLORIDE SERPL-SCNC: 101 MMOL/L — SIGNIFICANT CHANGE UP (ref 96–108)
CO2 SERPL-SCNC: 27 MMOL/L — SIGNIFICANT CHANGE UP (ref 22–31)
CREAT SERPL-MCNC: 1 MG/DL — SIGNIFICANT CHANGE UP (ref 0.5–1.3)
EGFR: 106 ML/MIN/1.73M2 — SIGNIFICANT CHANGE UP
GLUCOSE BLDC GLUCOMTR-MCNC: 268 MG/DL — HIGH (ref 70–99)
GLUCOSE BLDC GLUCOMTR-MCNC: 270 MG/DL — HIGH (ref 70–99)
GLUCOSE BLDC GLUCOMTR-MCNC: 280 MG/DL — HIGH (ref 70–99)
GLUCOSE BLDC GLUCOMTR-MCNC: 283 MG/DL — HIGH (ref 70–99)
GLUCOSE SERPL-MCNC: 284 MG/DL — HIGH (ref 70–99)
HCT VFR BLD CALC: 39.4 % — SIGNIFICANT CHANGE UP (ref 39–50)
HGB BLD-MCNC: 13.1 G/DL — SIGNIFICANT CHANGE UP (ref 13–17)
INSULIN SERPL-MCNC: 13.6 UU/ML — SIGNIFICANT CHANGE UP (ref 2.6–24.9)
LACTATE SERPL-SCNC: 0.4 MMOL/L — LOW (ref 0.7–2)
MAGNESIUM SERPL-MCNC: 2.4 MG/DL — SIGNIFICANT CHANGE UP (ref 1.6–2.6)
MCHC RBC-ENTMCNC: 28.8 PG — SIGNIFICANT CHANGE UP (ref 27–34)
MCHC RBC-ENTMCNC: 33.2 G/DL — SIGNIFICANT CHANGE UP (ref 32–36)
MCV RBC AUTO: 86.6 FL — SIGNIFICANT CHANGE UP (ref 80–100)
NRBC # BLD: 0 /100 WBCS — SIGNIFICANT CHANGE UP (ref 0–0)
PHOSPHATE SERPL-MCNC: 2.8 MG/DL — SIGNIFICANT CHANGE UP (ref 2.5–4.5)
PLATELET # BLD AUTO: 187 K/UL — SIGNIFICANT CHANGE UP (ref 150–400)
POTASSIUM SERPL-MCNC: 4 MMOL/L — SIGNIFICANT CHANGE UP (ref 3.5–5.3)
POTASSIUM SERPL-SCNC: 4 MMOL/L — SIGNIFICANT CHANGE UP (ref 3.5–5.3)
PROT SERPL-MCNC: 7.1 GM/DL — SIGNIFICANT CHANGE UP (ref 6–8.3)
RBC # BLD: 4.55 M/UL — SIGNIFICANT CHANGE UP (ref 4.2–5.8)
RBC # FLD: 13.3 % — SIGNIFICANT CHANGE UP (ref 10.3–14.5)
SODIUM SERPL-SCNC: 134 MMOL/L — LOW (ref 135–145)
VANCOMYCIN TROUGH SERPL-MCNC: 2.5 UG/ML — LOW (ref 10–20)
WBC # BLD: 14.48 K/UL — HIGH (ref 3.8–10.5)
WBC # FLD AUTO: 14.48 K/UL — HIGH (ref 3.8–10.5)

## 2024-10-08 PROCEDURE — 99223 1ST HOSP IP/OBS HIGH 75: CPT

## 2024-10-08 PROCEDURE — 99232 SBSQ HOSP IP/OBS MODERATE 35: CPT

## 2024-10-08 PROCEDURE — 99291 CRITICAL CARE FIRST HOUR: CPT

## 2024-10-08 RX ORDER — INSULIN GLARGINE,HUM.REC.ANLOG 100/ML
10 VIAL (ML) SUBCUTANEOUS EVERY MORNING
Refills: 0 | Status: DISCONTINUED | OUTPATIENT
Start: 2024-10-09 | End: 2024-10-11

## 2024-10-08 RX ORDER — INSULIN GLARGINE,HUM.REC.ANLOG 100/ML
10 VIAL (ML) SUBCUTANEOUS ONCE
Refills: 0 | Status: COMPLETED | OUTPATIENT
Start: 2024-10-08 | End: 2024-10-08

## 2024-10-08 RX ORDER — VANCOMYCIN HYDROCHLORIDE 50 MG/ML
1750 KIT ORAL EVERY 8 HOURS
Refills: 0 | Status: DISCONTINUED | OUTPATIENT
Start: 2024-10-08 | End: 2024-10-08

## 2024-10-08 RX ORDER — VANCOMYCIN HYDROCHLORIDE 50 MG/ML
1500 KIT ORAL EVERY 8 HOURS
Refills: 0 | Status: DISCONTINUED | OUTPATIENT
Start: 2024-10-09 | End: 2024-10-09

## 2024-10-08 RX ORDER — ACETAMINOPHEN 500 MG
1000 TABLET ORAL ONCE
Refills: 0 | Status: COMPLETED | OUTPATIENT
Start: 2024-10-08 | End: 2024-10-08

## 2024-10-08 RX ORDER — HYDROMORPHONE HCL/0.9% NACL/PF 6 MG/30 ML
0.5 PATIENT CONTROLLED ANALGESIA SYRINGE INTRAVENOUS ONCE
Refills: 0 | Status: DISCONTINUED | OUTPATIENT
Start: 2024-10-08 | End: 2024-10-08

## 2024-10-08 RX ADMIN — Medication 40 MILLIGRAM(S): at 05:16

## 2024-10-08 RX ADMIN — PIPERACILLIN AND TAZOBACTAM 25 GRAM(S): .5; 4 INJECTION, POWDER, LYOPHILIZED, FOR SOLUTION INTRAVENOUS at 05:17

## 2024-10-08 RX ADMIN — Medication 10 UNIT(S): at 11:31

## 2024-10-08 RX ADMIN — CHLORHEXIDINE GLUCONATE 1 APPLICATION(S): 40 SOLUTION TOPICAL at 05:18

## 2024-10-08 RX ADMIN — Medication 100 MILLIGRAM(S): at 05:17

## 2024-10-08 RX ADMIN — Medication 100 MILLIGRAM(S): at 14:38

## 2024-10-08 RX ADMIN — VANCOMYCIN HYDROCHLORIDE 250 MILLIGRAM(S): KIT at 05:16

## 2024-10-08 RX ADMIN — VANCOMYCIN HYDROCHLORIDE 250 MILLIGRAM(S): KIT at 18:53

## 2024-10-08 RX ADMIN — PIPERACILLIN AND TAZOBACTAM 25 GRAM(S): .5; 4 INJECTION, POWDER, LYOPHILIZED, FOR SOLUTION INTRAVENOUS at 14:38

## 2024-10-08 RX ADMIN — Medication 3: at 08:40

## 2024-10-08 RX ADMIN — Medication 100 MILLIGRAM(S): at 21:34

## 2024-10-08 RX ADMIN — Medication 40 MILLIGRAM(S): at 17:52

## 2024-10-08 RX ADMIN — Medication 3: at 11:30

## 2024-10-08 RX ADMIN — Medication 1: at 21:34

## 2024-10-08 RX ADMIN — Medication 400 MILLIGRAM(S): at 03:25

## 2024-10-08 RX ADMIN — PIPERACILLIN AND TAZOBACTAM 25 GRAM(S): .5; 4 INJECTION, POWDER, LYOPHILIZED, FOR SOLUTION INTRAVENOUS at 21:34

## 2024-10-08 RX ADMIN — PANTOPRAZOLE SODIUM 40 MILLIGRAM(S): 40 TABLET, DELAYED RELEASE ORAL at 14:37

## 2024-10-08 RX ADMIN — Medication 0.5 MILLIGRAM(S): at 19:01

## 2024-10-08 RX ADMIN — Medication 0.5 MILLIGRAM(S): at 18:18

## 2024-10-08 RX ADMIN — Medication 3: at 17:52

## 2024-10-08 NOTE — PROGRESS NOTE ADULT - ASSESSMENT
24 year old male with no significant PMHx who presented to the ED on 10/6/24 for complaints of left testicular pain. Pt admitted to medicine service for sepsis likely from a scrotal abscess. Urology following and decision was made to take pt to OR for I&D scrotal abscess. 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 Minimal EBL, given 1L IVF. No pressors administered. Pt extubated post-op with no complications. Transferred to ICU for hemodynamic monitoring.     # Neuro:  -A/Ox3  - pain control: PRN  #Resp: - ETHAN not on CPAP,  will use NIV if needed   -satting adequately on supplemental oxygen, maintain sats>92%   #CV:  - HD stable without vasopressor requirements  - MAP goal>65  #GI:  -Diet: will start regular diet   - GI ppx: PPI  - Bowel regimen  #Renal:- dewitt, cont to monitor strict I/Os  - replete lytes as appropriate   #ID:  //Sepsis likely in the setting of paul's gangrene from scrotal abscess   - empiric tx with vanc/zosyn/clinda  - will consult ID for abx management  - urology following, will appreciate recs  #Heme:  //DVT ppx: lovenox  - cbc stable  #Endo: DM Likely 2 antibodies sent.   - maintaining goal glucose<180 with ISS  - cont to monitor FS  - start lantus.     Ethics full code.  Can be downgrade from ICU

## 2024-10-08 NOTE — PROGRESS NOTE ADULT - SUBJECTIVE AND OBJECTIVE BOX
CHIEF COMPLAINT:Patient is a 24y old  Male who presents with a chief complaint of CELLULITIS, SCROTUM, SEPSIS, BANDEMIA     (08 Oct 2024 14:28)        Interval Events:    REVIEW OF SYSTEMS:  Scrotal pain but otherwise negative.   [x ] All other systems negative  [ ] Unable to assess ROS because ________    OBJECTIVE:  ICU Vital Signs Last 24 Hrs  T(C): 37.8 (08 Oct 2024 16:00), Max: 38.7 (08 Oct 2024 03:00)  T(F): 100 (08 Oct 2024 16:00), Max: 101.7 (08 Oct 2024 03:00)  HR: 92 (08 Oct 2024 16:00) (83 - 105)  BP: 137/111 (08 Oct 2024 15:00) (115/70 - 145/106)  BP(mean): 121 (08 Oct 2024 15:00) (84 - 121)  ABP: --  ABP(mean): --  RR: 25 (08 Oct 2024 16:00) (13 - 33)  SpO2: 97% (08 Oct 2024 16:00) (97% - 100%)    O2 Parameters below as of 07 Oct 2024 20:00  Patient On (Oxygen Delivery Method): nasal cannula  O2 Flow (L/min): 2            10-07 @ 07:01  -  10-08 @ 07:00  --------------------------------------------------------  IN: 1770 mL / OUT: 2950 mL / NET: -1180 mL    10-08 @ 07:01  -  10-08 @ 17:37  --------------------------------------------------------  IN: 150 mL / OUT: 300 mL / NET: -150 mL      CAPILLARY BLOOD GLUCOSE      POCT Blood Glucose.: 268 mg/dL (08 Oct 2024 11:29)      PHYSICAL EXAM:  GENERAL: NAD, lying in bed comfortably  ENT: B/L tonsillar enlargement  HEART: Regular rate and rhythm  LUNGS: Unlabored respirations.  Clear to auscultation bilaterally, no crackles, wheezing, or rhonchi  ABDOMEN: Soft, nontender, nondistended  EXTREMITIES: No clubbing, cyanosis, or edema  scrotal dressing saturated with purulent looking fluid.   NERVOUS SYSTEM:  A&Ox3      LINES:    HOSPITAL MEDICATIONS:  Standing Meds:  chlorhexidine 2% Cloths 1 Application(s) Topical <User Schedule>  clindamycin IVPB 900 milliGRAM(s) IV Intermittent every 8 hours  clindamycin IVPB      enoxaparin Injectable 40 milliGRAM(s) SubCutaneous every 12 hours  influenza   Vaccine 0.5 milliLiter(s) IntraMuscular once  insulin lispro (ADMELOG) corrective regimen sliding scale   SubCutaneous three times a day before meals  insulin lispro (ADMELOG) corrective regimen sliding scale   SubCutaneous at bedtime  pantoprazole  Injectable 40 milliGRAM(s) IV Push daily  piperacillin/tazobactam IVPB.. 3.375 Gram(s) IV Intermittent every 8 hours  vancomycin  IVPB 1750 milliGRAM(s) IV Intermittent every 12 hours      PRN Meds:  oxyCODONE    IR 5 milliGRAM(s) Oral every 6 hours PRN      LABS:                        13.1   14.48 )-----------( 187      ( 08 Oct 2024 03:00 )             39.4     Hgb Trend: 13.1<--, 13.2<--, 13.8<--, 15.9<--, 16.0<--  10-08    134[L]  |  101  |  19  ----------------------------<  284[H]  4.0   |  27  |  1.00    Ca    8.6      08 Oct 2024 03:00  Phos  2.8     10-08  Mg     2.4     10-08    TPro  7.1  /  Alb  2.2[L]  /  TBili  0.6  /  DBili  x   /  AST  16  /  ALT  27  /  AlkPhos  84  10-08    Creatinine Trend: 1.00<--, 1.17<--, 1.05<--, 1.12<--    Urinalysis Basic - ( 08 Oct 2024 03:00 )    Color: x / Appearance: x / SG: x / pH: x  Gluc: 284 mg/dL / Ketone: x  / Bili: x / Urobili: x   Blood: x / Protein: x / Nitrite: x   Leuk Esterase: x / RBC: x / WBC x   Sq Epi: x / Non Sq Epi: x / Bacteria: x            MICROBIOLOGY:     Culture - Urine (collected 06 Oct 2024 14:51)  Source: Clean Catch Clean Catch (Midstream)  Final Report (07 Oct 2024 14:44):    <10,000 CFU/mL Normal Urogenital Henny    Culture - Blood (collected 06 Oct 2024 14:28)  Source: .Blood BLOOD  Preliminary Report (07 Oct 2024 19:01):    No growth at 24 hours    Culture - Blood (collected 06 Oct 2024 13:57)  Source: .Blood BLOOD  Preliminary Report (07 Oct 2024 19:01):    No growth at 24 hours      RADIOLOGY:  [ ] Reviewed and interpreted by me    EKG:

## 2024-10-08 NOTE — DIETITIAN INITIAL EVALUATION ADULT - PERTINENT LABORATORY DATA
10-08    134[L]  |  101  |  19  ----------------------------<  284[H]  4.0   |  27  |  1.00    Ca    8.6      08 Oct 2024 03:00  Phos  2.8     10-08  Mg     2.4     10-08    TPro  7.1  /  Alb  2.2[L]  /  TBili  0.6  /  DBili  x   /  AST  16  /  ALT  27  /  AlkPhos  84  10-08  POCT Blood Glucose.: 268 mg/dL<H> (10-08-24 @ 11:29)  A1C with Estimated Average Glucose Result: 13.0 % <H>/estimated average Glucose 326 mg/dL<H> (10-07-24 @ 08:07)

## 2024-10-08 NOTE — CONSULT NOTE ADULT - SUBJECTIVE AND OBJECTIVE BOX
[facility]  INFECTIOUS DISEASES   [address] [place] [address]  Tel: [phone]     Fax: [phone]  ==============================================================================  DO Prabha Morrell MD Sehrish Shahid, MD Alexandra Gutman, NP   ==============================================================================    KALYN JOSE  MRN-[id number]  Male  24y ([birth year])        Patient is a 24y old  Male who presents with a chief complaint of Sepsis secondary to scrotal cellulitis vs. epididymitis (08 Oct 2024 18:34)      HPI:  Jose Low Jr. is a 24 year old male with no significant PMHx who presented to the ED on 10/6/24 for complaints of left testicular pain.    Patient reports he has been having left testicular pain since Thursday. Described as a burning sensation and severe pain to the point where he is unable to eat or sleep. Severity was 10/10. Did not take any analgesics for the pain. No trauma to area. Not currently sexually active and has never been. Came to the ER on 10/4/24 for further evaluation. WBC 17.47K at that time. U/A with trace ketones, proteinuria, negative leuks, negative nitrites, WBC 0, RBC 1, occasional bacteria, glucosuria. U/S scrotum and testicles without evidence of testicular torsion but with marked left scrotal wall thickening, edema, and mild hyperemia of left epididymal body and tail. Received ceftriaxone 500 mg IV x 1. Discharged home with prescription for doxycycline 100 mg BID. Spiking fevers with Tmax 101 and severe pain despite taking doxycycline and tylenol which prompted another visit to ER.     In the ED, Tmax 101.9, HR as elevated as 132, BP as elevated as 153/98. WBC 18.92K, blood glucose 340. U/A with ketonuria, proteinuria, negative nitrites, negative leuks, small blood, WBC 3, RBC 5, few bacteria, glucosuria. CT A/P with diffuse edema involving the scrotal skin with mild perineal extension. No focal gas collections. Received LR 2500 cc bolus, vancomycin 1500 mg IV, zosyn 3.375 g IV, clindamycin 900 mg IV, acetaminophen 975 mg PO, and morphine 4 mg IV. Evaluated by urology who recommended admission for IV antibiotics and does not recommend acute urologic intervention. (06 Oct 2024 20:16)    s/p I&D with purulence and devitalized tissue removed by urology   ID consulted for workup and antibiotic management     PAST MEDICAL & SURGICAL HISTORY:  No pertinent past medical history          Allergies  No Known Allergies        ANTIMICROBIALS:  clindamycin IVPB 900 every 8 hours  clindamycin IVPB    piperacillin/tazobactam IVPB.. 3.375 every 8 hours  vancomycin  IVPB 1500 every 8 hours      MEDICATIONS  (STANDING):  ceFAZolin   IVPB   100 mL/Hr IV Intermittent (10-07-24 @ 06:11)    clindamycin IVPB   100 mL/Hr IV Intermittent (10-07-24 @ 17:29)    clindamycin IVPB   100 mL/Hr IV Intermittent (10-06-24 @ 15:09)    clindamycin IVPB   100 mL/Hr IV Intermittent (10-08-24 @ 21:34)   100 mL/Hr IV Intermittent (10-08-24 @ 14:38)   100 mL/Hr IV Intermittent (10-08-24 @ 05:17)   100 mL/Hr IV Intermittent (10-07-24 @ 21:57)    clindamycin IVPB   100 mL/Hr IV Intermittent (10-07-24 @ 07:00)   100 mL/Hr IV Intermittent (10-06-24 @ 23:40)    piperacillin/tazobactam IVPB.   200 mL/Hr IV Intermittent (10-07-24 @ 17:29)    piperacillin/tazobactam IVPB.-   25 mL/Hr IV Intermittent (10-07-24 @ 21:57)    piperacillin/tazobactam IVPB..   25 mL/Hr IV Intermittent (10-08-24 @ 21:34)   25 mL/Hr IV Intermittent (10-08-24 @ 14:38)   25 mL/Hr IV Intermittent (10-08-24 @ 05:17)    piperacillin/tazobactam IVPB...   200 mL/Hr IV Intermittent (10-06-24 @ 14:39)    vancomycin  IVPB   250 mL/Hr IV Intermittent (10-06-24 @ 15:42)    vancomycin  IVPB   250 mL/Hr IV Intermittent (10-08-24 @ 18:53)   250 mL/Hr IV Intermittent (10-08-24 @ 05:16)   250 mL/Hr IV Intermittent (10-07-24 @ 18:21)        OTHER MEDS: MEDICATIONS  (STANDING):  enoxaparin Injectable 40 every 12 hours  influenza   Vaccine 0.5 once  insulin glargine Injectable (LANTUS) 10 every morning  insulin lispro (ADMELOG) corrective regimen sliding scale  at bedtime  insulin lispro (ADMELOG) corrective regimen sliding scale  three times a day before meals  oxyCODONE    IR 5 every 6 hours PRN  pantoprazole  Injectable 40 daily      SOCIAL HISTORY:     Smoking Cigarettes [ ]Active [ ] Former [x ]Denies   ETOH [ x]denies [ ]Former [ ]Current Use denies   Drug Use [x]Never [ ] Former [ ] Active     FAMILY HISTORY:  noncontributory     REVIEW OF SYSTEMS  [  ] ROS unobtainable because:    [ x ] All other systems negative except as noted below:	    Constitutional:  [ ] fever [ ] chills  [ ] weight loss  [ ] weakness  Skin:  [ ] rash [ ] phlebitis	  Eyes: [ ] icterus [ ] pain  [ ] discharge	  ENMT: [ ] sore throat  [ ] thrush [ ] ulcers [ ] exudates  Respiratory: [ ] dyspnea [ ] hemoptysis [ ] cough [ ] sputum	  Cardiovascular:  [ ] chest pain [ ] palpitations [ ] edema	  Gastrointestinal:  [ ] nausea [ ] vomiting [ ] diarrhea [ ] constipation [ ] pain	  Genitourinary:  [ ] dysuria [ ] frequency [ ] hematuria [ ] discharge [ x] scrotal pain  [ ] incontinence  Musculoskeletal:  [ ] myalgias [ ] arthralgias [ ] arthritis  [ ] back pain  Neurological:  [ ] headache [ ] seizures  [ ] confusion/altered mental status  Psychiatric:  [ ] anxiety [ ] depression	  Hematology/Lymphatics:  [ ] lymphadenopathy  Endocrine:  [ ] adrenal [ ] thyroid  Allergic/Immunologic:	 [ ] transplant [ ] seasonal    Vital Signs Last 24 Hrs  T(F): 97.8 (10-08-24 @ 23:52), Max: 103.7 (10-07-24 @ 05:28)    Vital Signs Last 24 Hrs  HR: 84 (10-09-24 @ 00:00) (83 - 101)  BP: 136/85 (10-09-24 @ 00:00) (115/75 - 148/90)  RR: 24 (10-09-24 @ 00:00)  SpO2: 98% (10-09-24 @ 00:00) (97% - 100%)  Wt(kg): --    PHYSICAL EXAM:  Constitutional: non-toxic, no distress  HEAD/EYES: anicteric, no conjunctival injection  ENT:  supple, no thrush  Cardiovascular:   normal S1, S2, no murmur, no edema  Respiratory:  clear BS bilaterally, no wheezes, no rales  GI:  soft, non-tender, normal bowel sounds  :  +dewitt, no CVA tenderness, scrotal edema, heavily packed debrided skin in scrotal area that is tender   Musculoskeletal:  no synovitis, normal ROM  Neurologic: awake and alert, normal strength, no focal findings  Skin:  no rash, no erythema, no phlebitis  Heme/Onc: no lymphadenopathy   Psychiatric:  awake, alert, appropriate mood        WBC  WBC Count: 14.48 K/uL (10-08 @ 03:00)  WBC Count: 13.64 K/uL (10-07 @ 17:47)  WBC Count: 16.36 K/uL (10-07 @ 08:07)  WBC Count: 18.92 K/uL (10-06 @ 14:28)  WBC Count: 17.47 K/uL (10-04 @ 18:06)      Auto Neutrophil %: 78.0 % *H* (10-07-24 @ 17:47)  Auto Neutrophil #: 11.05 K/uL *H* (10-07-24 @ 17:47)  Auto Neutrophil %: 68.0 % (10-06-24 @ 14:28)  Auto Neutrophil #: 15.70 K/uL *H* (10-06-24 @ 14:28)  Auto Neutrophil %: 77.7 % *H* (10-04-24 @ 18:06)  Auto Neutrophil #: 13.57 K/uL *H* (10-04-24 @ 18:06)    CBC                        13.1   14.48 )-----------( 187      ( 08 Oct 2024 03:00 )             39.4     BMP/CMP  10-08    134[L]  |  101  |  19  ----------------------------<  284[H]  4.0   |  27  |  1.00    Ca    8.6      08 Oct 2024 03:00  Phos  2.8     10-08  Mg     2.4     10-08    TPro  7.1  /  Alb  2.2[L]  /  TBili  0.6  /  DBili  x   /  AST  16  /  ALT  27  /  AlkPhos  84  10-08    Creatinine Trend  Creatinine Trend: 1.00<--, 1.17<--, 1.05<--, 1.12<--        Lactate, Blood: 0.4 mmol/L (10-08-24 @ 03:00)            MICROBIOLOGY:  Clean Catch Clean Catch (Midstream)  10-06-24   <10,000 CFU/mL Normal Urogenital Henny  --  --      .Blood BLOOD  10-06-24   No growth at 48 Hours  --  --      .Blood BLOOD  10-06-24   No growth at 48 Hours  --  --      Clean Catch Clean Catch (Midstream)  10-04-24   <10,000 CFU/mL Normal Urogenital Henny  --  --      Vancomycin Level, Trough: 2.5 ug/mL (10-08-24 @ 17:30)        RADIOLOGY:      ACC: [id number] EXAM:  CT ABDOMEN AND PELVIS IC   ORDERED BY: SHEILA HUANG     PROCEDURE DATE:  10/06/2024          INTERPRETATION:  CLINICAL INFORMATION: Scrotal cellulitis/necrotizing   fasciitis/abscess.    COMPARISON: Scrotal sonography dated 10/04/2024.    CONTRAST/COMPLICATIONS:  IV Contrast: Omnipaque 350  90 cc was administered   10 cc was discarded  Oral Contrast: None  Complications: None reported at time of study completion    PROCEDURE:  CT of the Abdomen and Pelvis was performed.  Sagittal and coronal reformats were performed.    FINDINGS:  LOWER CHEST: Trace atelectasis.    LIVER: Steatosis.  BILE DUCTS: Normal caliber.  GALLBLADDER: Within normal limits.  SPLEEN: Within normal limits.  PANCREAS: Within normal limits.  ADRENALS: Within normal limits.  KIDNEYS/URETERS: Within normal limits.    BLADDER: Under distended, unremarkable.  REPRODUCTIVE ORGANS: Prostate within normal limits. Probable generalized   scrotal skin thickening/edema. There is mild perineal and base of the   penis extension. No focal gas collections.    BOWEL: No bowel obstruction. Appendix is normal.  PERITONEUM/RETROPERITONEUM: Within normal limits.  VESSELS: Within normal limits.  LYMPH NODES: Left retroperitoneal nodes measuring up to 1.4 cm in short   axis below the level of left renal vein.  ABDOMINAL WALL: As above.  BONES: Within normal limits.    IMPRESSION:  Diffuse edema involving the scrotal skin with mild perineal extension. No   focal gas collections.      SVETA LOFTON MD  This document has been electronically signed. Oct  6 2024  3:52PM    I have personally reviewed the above imaging

## 2024-10-08 NOTE — DIETITIAN INITIAL EVALUATION ADULT - ORAL INTAKE PTA/DIET HISTORY
Pt reports good appetite in general, without diet restrictions prior to adm.  Pt's grandmother did the shopping/cooking.  Pt without report of chewing/swallowing difficulty/food allergies/intolerances.

## 2024-10-08 NOTE — PROGRESS NOTE ADULT - ASSESSMENT
24M with Paul's gangrene now POD#1 S/P scrotal exploration, excision/debridement of paul's gangrene    Plan:  - local wound care, daily dressing change   - DVT prophylaxis, Incentive Spirometer, OOB, Ambulating, pain control  - Continue antibiotics: Vanco/Zosyn/Clinda  - Li, monitor urine output  - f/u labs   - Recommend hyperbaric oxygen therapy   - continue current management per ICU    d/w Dr. Martin   Urology   Jamila Bruce PA-C

## 2024-10-08 NOTE — DIETITIAN INITIAL EVALUATION ADULT - PERTINENT MEDS FT
MEDICATIONS  (STANDING):  chlorhexidine 2% Cloths 1 Application(s) Topical <User Schedule>  clindamycin IVPB 900 milliGRAM(s) IV Intermittent every 8 hours  clindamycin IVPB      enoxaparin Injectable 40 milliGRAM(s) SubCutaneous every 12 hours  influenza   Vaccine 0.5 milliLiter(s) IntraMuscular once  insulin lispro (ADMELOG) corrective regimen sliding scale   SubCutaneous at bedtime  insulin lispro (ADMELOG) corrective regimen sliding scale   SubCutaneous three times a day before meals  pantoprazole  Injectable 40 milliGRAM(s) IV Push daily  piperacillin/tazobactam IVPB.. 3.375 Gram(s) IV Intermittent every 8 hours  vancomycin  IVPB 1750 milliGRAM(s) IV Intermittent every 12 hours    MEDICATIONS  (PRN):  oxyCODONE    IR 5 milliGRAM(s) Oral every 6 hours PRN Severe Pain (7 - 10)

## 2024-10-08 NOTE — PROGRESS NOTE ADULT - SUBJECTIVE AND OBJECTIVE BOX
UROLOGY DAILY PROGRESS NOTE    pt seen and examined at bedside. no complaints. pain controlled. dewitt in situ    MEDICATIONS  (STANDING):  chlorhexidine 2% Cloths 1 Application(s) Topical <User Schedule>  clindamycin IVPB 900 milliGRAM(s) IV Intermittent every 8 hours  clindamycin IVPB      enoxaparin Injectable 40 milliGRAM(s) SubCutaneous every 12 hours  influenza   Vaccine 0.5 milliLiter(s) IntraMuscular once  insulin lispro (ADMELOG) corrective regimen sliding scale   SubCutaneous at bedtime  insulin lispro (ADMELOG) corrective regimen sliding scale   SubCutaneous three times a day before meals  pantoprazole  Injectable 40 milliGRAM(s) IV Push daily  piperacillin/tazobactam IVPB.. 3.375 Gram(s) IV Intermittent every 8 hours  vancomycin  IVPB 1750 milliGRAM(s) IV Intermittent every 12 hours    MEDICATIONS  (PRN):  oxyCODONE    IR 5 milliGRAM(s) Oral every 6 hours PRN Severe Pain (7 - 10)      REVIEW OF SYSTEMS   [x] A ten-point review of systems was otherwise negative except as noted.  [ ] Due to altered mental status/intubation, subjective information were not able to be obtained from patient. History was obtained, to the extent possible, from review of the chart and collateral sources of information.  Vital Signs Last 24 Hrs  T(C): 37.9 (08 Oct 2024 18:00), Max: 38.7 (08 Oct 2024 03:00)  T(F): 100.2 (08 Oct 2024 18:00), Max: 101.7 (08 Oct 2024 03:00)  HR: 94 (08 Oct 2024 18:00) (83 - 101)  BP: 140/68 (08 Oct 2024 18:00) (115/70 - 145/106)  BP(mean): 88 (08 Oct 2024 18:00) (84 - 121)  RR: 25 (08 Oct 2024 18:00) (13 - 33)  SpO2: 99% (08 Oct 2024 18:00) (97% - 100%)    Parameters below as of 07 Oct 2024 20:00  Patient On (Oxygen Delivery Method): nasal cannula  O2 Flow (L/min): 2      PHYSICAL EXAM:    GEN: NAD, well-developed, awake and alert.  SKIN: Good color, non diaphoretic.  HEENT: NC/AT.  RESP: No dyspnea, non-labored breathing. No use of accessory muscles.  CARDIO: +S1/S2  ABDO: Soft, NT/ND, no palpable bladder, no suprapubic tenderness  BACK: No CVAT B/L  : + Circumcised male. scrotum dressing removed, tissue viable, packed with xeroform and wet-to-dry kerlex    I&O's Summary    07 Oct 2024 07:01  -  08 Oct 2024 07:00  --------------------------------------------------------  IN: 1770 mL / OUT: 2950 mL / NET: -1180 mL    08 Oct 2024 07:01  -  08 Oct 2024 18:34  --------------------------------------------------------  IN: 150 mL / OUT: 1200 mL / NET: -1050 mL        LABS:                        13.1   14.48 )-----------( 187      ( 08 Oct 2024 03:00 )             39.4     10-08    134[L]  |  101  |  19  ----------------------------<  284[H]  4.0   |  27  |  1.00    Ca    8.6      08 Oct 2024 03:00  Phos  2.8     10-08  Mg     2.4     10-08    TPro  7.1  /  Alb  2.2[L]  /  TBili  0.6  /  DBili  x   /  AST  16  /  ALT  27  /  AlkPhos  84  10-08      Urinalysis Basic - ( 08 Oct 2024 03:00 )    Color: x / Appearance: x / SG: x / pH: x  Gluc: 284 mg/dL / Ketone: x  / Bili: x / Urobili: x   Blood: x / Protein: x / Nitrite: x   Leuk Esterase: x / RBC: x / WBC x   Sq Epi: x / Non Sq Epi: x / Bacteria: x            RADIOLOGY & ADDITIONAL STUDIES:

## 2024-10-08 NOTE — CONSULT NOTE ADULT - ASSESSMENT
24M with Paul's gangrene now POD#1 S/P scrotal exploration, excision/debridement of paul's gangrene  see brief op report which showed:  purulent drainage upon entering scrotum, devitalized tissue excised, slough debrided from b/l scrotum  had fever leukocytosis, tachycardia     #Paul's gangrene  #Cellulitis   #Sepsis  #newly Diabetes mellitus type 2  #STD screening     Plan:  continue vancomycin now being monitored and adjusted per pharmacy   send vancomycin trough with target AUC/BEVERLY 400-600   (therapeutic drug monitoring required with IV vancomycin)   continue (Zosyn) Piperacillin/tazobactam 3.375 grams every 8 hours   continue clindamycin for its antitoxin properties   local wound care, daily dressing change per urology   DVT prophylaxis, Incentive Spirometer, OOB, pain control  Li, monitor urine output  trend wbc count   Recommend hyperbaric oxygen therapy  send HIV  send Gc/chlamydia  send syphilis   newly diagnosed DM2-needs good insulin regimen please consider endocrine consult   recognize he is insulin naive and could be very sensitive to insulin doses and need to be mindful of doses to avoid hypoglycemic episodes     Discussed plan with ICU    Rome Briones DO  Chief, Infectious Disease at [facility]  Reachable via Microsoft Teams or ID office: [phone]  Weekdays: After 5pm, please call [phone] for all inquiries and new consults  Weekends: Message on-call infectious disease physician via teams (see Skip)

## 2024-10-08 NOTE — DIETITIAN INITIAL EVALUATION ADULT - OTHER INFO
Pt dislikes clear fluids, would like to get diet advanced.  No significant PMH noted.  Per pt., he was told history of pre-diabetes.  Pt s/p scrotal exploration, excision and debridement of paul's gangrene on 10/07.  Pt at present is not ready for education for consistent CHO nutrition therapy due to acute illness, s/p surgery.

## 2024-10-09 PROBLEM — Z78.9 OTHER SPECIFIED HEALTH STATUS: Chronic | Status: ACTIVE | Noted: 2024-10-06

## 2024-10-09 LAB
ALBUMIN SERPL ELPH-MCNC: 2.1 G/DL — LOW (ref 3.3–5)
ALP SERPL-CCNC: 77 U/L — SIGNIFICANT CHANGE UP (ref 40–120)
ALT FLD-CCNC: 42 U/L — SIGNIFICANT CHANGE UP (ref 12–78)
ANION GAP SERPL CALC-SCNC: 8 MMOL/L — SIGNIFICANT CHANGE UP (ref 5–17)
AST SERPL-CCNC: 37 U/L — SIGNIFICANT CHANGE UP (ref 15–37)
BASOPHILS # BLD AUTO: 0.1 K/UL — SIGNIFICANT CHANGE UP (ref 0–0.2)
BASOPHILS NFR BLD AUTO: 0.9 % — SIGNIFICANT CHANGE UP (ref 0–2)
BILIRUB SERPL-MCNC: 0.4 MG/DL — SIGNIFICANT CHANGE UP (ref 0.2–1.2)
BUN SERPL-MCNC: 17 MG/DL — SIGNIFICANT CHANGE UP (ref 7–23)
CALCIUM SERPL-MCNC: 8.8 MG/DL — SIGNIFICANT CHANGE UP (ref 8.5–10.1)
CHLORIDE SERPL-SCNC: 98 MMOL/L — SIGNIFICANT CHANGE UP (ref 96–108)
CO2 SERPL-SCNC: 27 MMOL/L — SIGNIFICANT CHANGE UP (ref 22–31)
CREAT SERPL-MCNC: 0.8 MG/DL — SIGNIFICANT CHANGE UP (ref 0.5–1.3)
CRP SERPL-MCNC: 231 MG/L — HIGH
EGFR: 125 ML/MIN/1.73M2 — SIGNIFICANT CHANGE UP
EOSINOPHIL # BLD AUTO: 0.11 K/UL — SIGNIFICANT CHANGE UP (ref 0–0.5)
EOSINOPHIL NFR BLD AUTO: 1 % — SIGNIFICANT CHANGE UP (ref 0–6)
ERYTHROCYTE [SEDIMENTATION RATE] IN BLOOD: 80 MM/HR — HIGH (ref 0–15)
GLUCOSE BLDC GLUCOMTR-MCNC: 253 MG/DL — HIGH (ref 70–99)
GLUCOSE BLDC GLUCOMTR-MCNC: 263 MG/DL — HIGH (ref 70–99)
GLUCOSE BLDC GLUCOMTR-MCNC: 273 MG/DL — HIGH (ref 70–99)
GLUCOSE BLDC GLUCOMTR-MCNC: 311 MG/DL — HIGH (ref 70–99)
GLUCOSE SERPL-MCNC: 304 MG/DL — HIGH (ref 70–99)
HCT VFR BLD CALC: 37.6 % — LOW (ref 39–50)
HGB BLD-MCNC: 12.7 G/DL — LOW (ref 13–17)
IMM GRANULOCYTES NFR BLD AUTO: 2.9 % — HIGH (ref 0–0.9)
LYMPHOCYTES # BLD AUTO: 2.6 K/UL — SIGNIFICANT CHANGE UP (ref 1–3.3)
LYMPHOCYTES # BLD AUTO: 24.4 % — SIGNIFICANT CHANGE UP (ref 13–44)
MAGNESIUM SERPL-MCNC: 2 MG/DL — SIGNIFICANT CHANGE UP (ref 1.6–2.6)
MCHC RBC-ENTMCNC: 29.3 PG — SIGNIFICANT CHANGE UP (ref 27–34)
MCHC RBC-ENTMCNC: 33.8 G/DL — SIGNIFICANT CHANGE UP (ref 32–36)
MCV RBC AUTO: 86.6 FL — SIGNIFICANT CHANGE UP (ref 80–100)
MONOCYTES # BLD AUTO: 1.06 K/UL — HIGH (ref 0–0.9)
MONOCYTES NFR BLD AUTO: 10 % — SIGNIFICANT CHANGE UP (ref 2–14)
NEUTROPHILS # BLD AUTO: 6.47 K/UL — SIGNIFICANT CHANGE UP (ref 1.8–7.4)
NEUTROPHILS NFR BLD AUTO: 60.8 % — SIGNIFICANT CHANGE UP (ref 43–77)
NRBC # BLD: 0 /100 WBCS — SIGNIFICANT CHANGE UP (ref 0–0)
PHOSPHATE SERPL-MCNC: 2.8 MG/DL — SIGNIFICANT CHANGE UP (ref 2.5–4.5)
PLATELET # BLD AUTO: 181 K/UL — SIGNIFICANT CHANGE UP (ref 150–400)
POTASSIUM SERPL-MCNC: 3.3 MMOL/L — LOW (ref 3.5–5.3)
POTASSIUM SERPL-SCNC: 3.3 MMOL/L — LOW (ref 3.5–5.3)
PROT SERPL-MCNC: 7 GM/DL — SIGNIFICANT CHANGE UP (ref 6–8.3)
RBC # BLD: 4.34 M/UL — SIGNIFICANT CHANGE UP (ref 4.2–5.8)
RBC # FLD: 13.5 % — SIGNIFICANT CHANGE UP (ref 10.3–14.5)
SODIUM SERPL-SCNC: 133 MMOL/L — LOW (ref 135–145)
SURGICAL PATHOLOGY STUDY: SIGNIFICANT CHANGE UP
VANCOMYCIN TROUGH SERPL-MCNC: 5.6 UG/ML — LOW (ref 10–20)
WBC # BLD: 10.65 K/UL — HIGH (ref 3.8–10.5)
WBC # FLD AUTO: 10.65 K/UL — HIGH (ref 3.8–10.5)

## 2024-10-09 PROCEDURE — 99232 SBSQ HOSP IP/OBS MODERATE 35: CPT

## 2024-10-09 PROCEDURE — 99233 SBSQ HOSP IP/OBS HIGH 50: CPT

## 2024-10-09 PROCEDURE — 99223 1ST HOSP IP/OBS HIGH 75: CPT | Mod: 25

## 2024-10-09 PROCEDURE — 99183 HYPERBARIC OXYGEN THERAPY: CPT

## 2024-10-09 RX ORDER — POTASSIUM CHLORIDE 10 MEQ
40 TABLET, EXTENDED RELEASE ORAL ONCE
Refills: 0 | Status: COMPLETED | OUTPATIENT
Start: 2024-10-09 | End: 2024-10-09

## 2024-10-09 RX ORDER — LINEZOLID 600 MG
600 TABLET ORAL ONCE
Refills: 0 | Status: COMPLETED | OUTPATIENT
Start: 2024-10-09 | End: 2024-10-09

## 2024-10-09 RX ORDER — LINEZOLID 600 MG
600 TABLET ORAL EVERY 12 HOURS
Refills: 0 | Status: COMPLETED | OUTPATIENT
Start: 2024-10-10 | End: 2024-10-14

## 2024-10-09 RX ORDER — LINEZOLID 600 MG
TABLET ORAL
Refills: 0 | Status: COMPLETED | OUTPATIENT
Start: 2024-10-09 | End: 2024-10-14

## 2024-10-09 RX ORDER — HYDROMORPHONE HCL/0.9% NACL/PF 6 MG/30 ML
0.5 PATIENT CONTROLLED ANALGESIA SYRINGE INTRAVENOUS ONCE
Refills: 0 | Status: DISCONTINUED | OUTPATIENT
Start: 2024-10-09 | End: 2024-10-09

## 2024-10-09 RX ADMIN — Medication 40 MILLIGRAM(S): at 05:47

## 2024-10-09 RX ADMIN — VANCOMYCIN HYDROCHLORIDE 300 MILLIGRAM(S): KIT at 02:59

## 2024-10-09 RX ADMIN — PIPERACILLIN AND TAZOBACTAM 25 GRAM(S): .5; 4 INJECTION, POWDER, LYOPHILIZED, FOR SOLUTION INTRAVENOUS at 05:47

## 2024-10-09 RX ADMIN — PIPERACILLIN AND TAZOBACTAM 25 GRAM(S): .5; 4 INJECTION, POWDER, LYOPHILIZED, FOR SOLUTION INTRAVENOUS at 17:25

## 2024-10-09 RX ADMIN — CHLORHEXIDINE GLUCONATE 1 APPLICATION(S): 40 SOLUTION TOPICAL at 05:48

## 2024-10-09 RX ADMIN — Medication 3: at 08:58

## 2024-10-09 RX ADMIN — Medication 2: at 22:18

## 2024-10-09 RX ADMIN — Medication 0.5 MILLIGRAM(S): at 08:56

## 2024-10-09 RX ADMIN — Medication 3: at 17:30

## 2024-10-09 RX ADMIN — Medication 10 UNIT(S): at 08:59

## 2024-10-09 RX ADMIN — Medication 40 MILLIEQUIVALENT(S): at 05:47

## 2024-10-09 RX ADMIN — Medication 100 MILLIGRAM(S): at 05:48

## 2024-10-09 RX ADMIN — Medication 300 MILLIGRAM(S): at 17:25

## 2024-10-09 RX ADMIN — PANTOPRAZOLE SODIUM 40 MILLIGRAM(S): 40 TABLET, DELAYED RELEASE ORAL at 17:24

## 2024-10-09 RX ADMIN — PIPERACILLIN AND TAZOBACTAM 25 GRAM(S): .5; 4 INJECTION, POWDER, LYOPHILIZED, FOR SOLUTION INTRAVENOUS at 22:18

## 2024-10-09 RX ADMIN — Medication 0.5 MILLIGRAM(S): at 09:58

## 2024-10-09 RX ADMIN — Medication 40 MILLIGRAM(S): at 17:24

## 2024-10-09 NOTE — CONSULT NOTE ADULT - ASSESSMENT
24M with Paul's gangrene now POD#2 S/P scrotal exploration, excision/debridement of paul's gangrene    Plan:  Seen and examined with Dr Wiley, will likely benefit from hyperbaric oxygen therapy. Scheduled for 1 pm, discussed with ICU provider and ICU RN  - continue local wound care, daily dressing change   - continue current management per ICU    24M with Paul's gangrene now POD#2 S/P scrotal exploration, excision/debridement of paul's gangrene  newly diagnosed DM, A1C 13    Plan:  Seen and examined with Dr Wiley, will likely benefit from hyperbaric oxygen therapy. Scheduled for 1 pm, discussed with ICU provider and ICU RN  - continue local wound care, daily dressing change   - continue current management per ICU

## 2024-10-09 NOTE — CONSULT NOTE ADULT - NS ATTEND AMEND GEN_ALL_CORE FT
The patient is seen and examined today with NP in the ICU#4. H/O Noted, s/p excisional debridement of the scrotum, in last 48 hours, skin and SQ , for infn, Fourniers gangrene and currently the patient is in ICU, extubated, on IV ABX. The patient is hemodynamically stable.  The wound is evaluated by me. Very tender in the scrotum, edges of the scrotal skin and with edematous skin, compromised skin and very tender. No pus is seen. No SOB, NO H/O seizures, H/O DM, poorly controlled.  plan-- The patient has necrotizing soft tissue infection and fourniers Gangrene, s/p debridement and hemaodynamicaly stable will Benefit from in hospital HBOT to control the spread of infection. I have discussed with the patient and will start the HBOT today.

## 2024-10-09 NOTE — PROGRESS NOTE ADULT - ASSESSMENT
24 year old male with no significant PMHx who presented to the ED on 10/6/24 for complaints of left testicular pain. Pt admitted to medicine service for sepsis likely from a scrotal abscess. Urology following and decision was made to take pt to OR for I&D scrotal abscess. 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 Minimal EBL, given 1L IVF. No pressors administered. Pt extubated post-op with no complications. Transferred to ICU for hemodynamic monitoring.     # Neuro: -A/Ox3  - pain control: PRN  #Resp: - ETHAN not on CPAP,  will use NIV if needed   -satting adequately on supplemental oxygen, maintain sats>92%   #CV:- HD stable without vasopressor requirements  - MAP goal>65  #GI: Diet: will start regular diet   - Bowel regimen  #Renal:- dewitt, cont to monitor strict I/Os  - replete lytes as appropriate   #ID:Sepsis likely in the setting of paul's gangrene from scrotal abscess   - empiric tx with vanc/zosyn/clinda will need pharamcy assistance with dosing due to subtheraputic levels.   - urology following, will appreciate recs, wound healing with Hyperbaric oxygen  #Heme:DVT ppx: lovenox  - cbc stable  #Endo: DM Likely 2 antibodies sent.  maintaining goal glucose ~ 180 given increased sensetivity during Hyperbaric oxygen and avoiding hypoglycemia.  Still needs up titration and will need to start pre meals.   - c/w lantus 10, add premeals.     Ethics full code.  Can be downgrade from ICU

## 2024-10-09 NOTE — PROGRESS NOTE ADULT - SUBJECTIVE AND OBJECTIVE BOX
CHIEF COMPLAINT:Patient is a 24y old  Male who presents with a chief complaint of Sepsis secondary to scrotal cellulitis vs. epididymitis (09 Oct 2024 09:20)        Interval Events:  None  REVIEW OF SYSTEMS: Pain controlled, no complaints.   [x] All other systems negative  [ ] Unable to assess ROS because ________    OBJECTIVE:  ICU Vital Signs Last 24 Hrs  T(C): 36.6 (09 Oct 2024 16:39), Max: 37.9 (08 Oct 2024 18:00)  T(F): 97.8 (09 Oct 2024 16:39), Max: 100.2 (08 Oct 2024 18:00)  HR: 95 (09 Oct 2024 12:00) (76 - 95)  BP: 145/80 (09 Oct 2024 12:00) (129/98 - 158/96)  BP(mean): 101 (09 Oct 2024 08:00) (88 - 112)  ABP: --  ABP(mean): --  RR: 18 (09 Oct 2024 12:00) (13 - 31)  SpO2: 100% (09 Oct 2024 12:00) (95% - 100%)    O2 Parameters below as of 08 Oct 2024 20:00  Patient On (Oxygen Delivery Method): room air              10-08 @ 07:01  -  10-09 @ 07:00  --------------------------------------------------------  IN: 1570 mL / OUT: 2775 mL / NET: -1205 mL      CAPILLARY BLOOD GLUCOSE      POCT Blood Glucose.: 263 mg/dL (09 Oct 2024 11:46)      PHYSICAL EXAM:  GENERAL: NAD, lying in bed comfortably  ENT: B/L tonsillar enlargement  HEART: Regular rate and rhythm  LUNGS: Unlabored respirations.  Clear to auscultation bilaterally, no crackles, wheezing, or rhonchi  ABDOMEN: Soft, nontender, nondistended  EXTREMITIES: No clubbing, cyanosis, or edema  scrotal dressing saturated with purulent looking fluid.   NERVOUS SYSTEM:  A&Ox3    LINES:    HOSPITAL MEDICATIONS:  Standing Meds:  chlorhexidine 2% Cloths 1 Application(s) Topical <User Schedule>  enoxaparin Injectable 40 milliGRAM(s) SubCutaneous every 12 hours  influenza   Vaccine 0.5 milliLiter(s) IntraMuscular once  insulin glargine Injectable (LANTUS) 10 Unit(s) SubCutaneous every morning  insulin lispro (ADMELOG) corrective regimen sliding scale   SubCutaneous three times a day before meals  insulin lispro (ADMELOG) corrective regimen sliding scale   SubCutaneous at bedtime  linezolid  IVPB 600 milliGRAM(s) IV Intermittent once  linezolid  IVPB      pantoprazole  Injectable 40 milliGRAM(s) IV Push daily  piperacillin/tazobactam IVPB.. 3.375 Gram(s) IV Intermittent every 8 hours      PRN Meds:  oxyCODONE    IR 5 milliGRAM(s) Oral every 6 hours PRN      LABS:                        12.7   10.65 )-----------( 181      ( 09 Oct 2024 03:00 )             37.6     Hgb Trend: 12.7<--, 13.1<--, 13.2<--, 13.8<--, 15.9<--  10-09    133[L]  |  98  |  17  ----------------------------<  304[H]  3.3[L]   |  27  |  0.80    Ca    8.8      09 Oct 2024 03:00  Phos  2.8     10-09  Mg     2.0     10-09    TPro  7.0  /  Alb  2.1[L]  /  TBili  0.4  /  DBili  x   /  AST  37  /  ALT  42  /  AlkPhos  77  10-09    Creatinine Trend: 0.80<--, 1.00<--, 1.17<--, 1.05<--, 1.12<--    Urinalysis Basic - ( 09 Oct 2024 03:00 )    Color: x / Appearance: x / SG: x / pH: x  Gluc: 304 mg/dL / Ketone: x  / Bili: x / Urobili: x   Blood: x / Protein: x / Nitrite: x   Leuk Esterase: x / RBC: x / WBC x   Sq Epi: x / Non Sq Epi: x / Bacteria: x            MICROBIOLOGY:     Culture - Wound Aerobic/Anaerobic (collected 07 Oct 2024 19:31)  Source: .Surgical Swab  Preliminary Report (09 Oct 2024 08:10):    No growth to date.      RADIOLOGY:  [ ] Reviewed and interpreted by me    EKG:

## 2024-10-09 NOTE — CHART NOTE - NSCHARTNOTEFT_GEN_A_CORE
ICU/CCU Transfer Note    Transfer from: ICU/ CCU  Transfer to: medicine   Accepting physician: Jose   Case discussed with: Dr Ragland       MICU COURSE:    24 year old male with no significant PMHx who presented to the ED on 10/6/24 for complaints of left testicular pain. Pt admitted to medicine service for sepsis likely from a scrotal abscess. Urology following and decision was made to take pt 10/7  OR for I&D scrotal abscess. 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 Minimal EBL, given 1L IVF. No pressors administered. Pt extubated post-op with no complications. Transferred to ICU for hemodynamic monitoring.     # Neuro:  -A/Ox3  - pain control: PRN    #Resp: - ETHAN not on CPAP,  will use NIV if needed   -satting adequately on supplemental oxygen, maintain sats>92%     #CV:  - HD stable without vasopressor requirements  - MAP goal>65    #GI:  -Diet: will start regular diet   - GI ppx: PPI  - Bowel regimen    #Renal:- dewitt, cont to monitor strict I/Os  - replete lytes as appropriate     #ID:  //Sepsis likely in the setting of paul's gangrene from scrotal abscess   - empiric tx with zyvox and zosyn   - will consult ID for abx management  - urology following, will appreciate recs    #Heme:  //DVT ppx: lovenox  - cbc stable    #Endo: DM Likely 2 antibodies sent.   - maintaining goal glucose<180 with ISS  - cont to monitor FS  - start lantus.     Pharmacotherapy Intervention Note (10-09-24 @ 13:22)      VITALS  ========  Vital Signs Last 24 Hrs  T(C): 36.2 (09 Oct 2024 11:07), Max: 37.9 (08 Oct 2024 18:00)  T(F): 97.1 (09 Oct 2024 11:07), Max: 100.2 (08 Oct 2024 18:00)  HR: 95 (09 Oct 2024 12:00) (76 - 95)  BP: 145/80 (09 Oct 2024 12:00) (129/98 - 158/96)  BP(mean): 101 (09 Oct 2024 08:00) (88 - 121)  RR: 18 (09 Oct 2024 12:00) (13 - 31)  SpO2: 100% (09 Oct 2024 12:00) (95% - 100%)    Parameters below as of 08 Oct 2024 20:00  Patient On (Oxygen Delivery Method): room air      I&O's Summary    08 Oct 2024 07:01  -  09 Oct 2024 07:00  --------------------------------------------------------  IN: 1570 mL / OUT: 2775 mL / NET: -1205 mL          LABS                                            12.7                  Neurophils% (auto):   60.8   (10-09 @ 03:00):    10.65)-----------(181          Lymphocytes% (auto):  24.4                                          37.6                   Eosinphils% (auto):   1.0      Manual%: Neutrophils x    ; Lymphocytes x    ; Eosinophils x    ; Bands%: x    ; Blasts x                                    133    |  98     |  17                  Calcium: 8.8   / iCa: x      (10-09 @ 03:00)    ----------------------------<  304       Magnesium: 2.0                              3.3     |  27     |  0.80             Phosphorous: 2.8      TPro  7.0    /  Alb  2.1    /  TBili  0.4    /  DBili  x      /  AST  37     /  ALT  42     /  AlkPhos  77     09 Oct 2024 03:00

## 2024-10-09 NOTE — PROVIDER CONTACT NOTE (EICU) - ACTION/TREATMENT ORDERED:
E-alerted by bedside staff, requesting vanco through order, placed as requested, results to be followed up by bedside provider.

## 2024-10-09 NOTE — PROGRESS NOTE ADULT - SUBJECTIVE AND OBJECTIVE BOX
Patient seen and examined bedside resting comfortably.  C/O Scrotal pain   Has indwelling Li catheter.  Denies nausea and vomiting. Tolerating diet.  Denies chest pain, dyspnea, cough. Febrile overnight    T(F): 97.7 (10-09-24 @ 07:12), Max: 100.2 (10-08-24 @ 18:00)  HR: 83 (10-09-24 @ 09:00) (76 - 100)  BP: 131/88 (10-09-24 @ 08:00) (128/76 - 158/96)  RR: 16 (10-09-24 @ 09:00) (13 - 31)  SpO2: 95% (10-09-24 @ 09:00) (95% - 100%)  Wt(kg): --  CAPILLARY BLOOD GLUCOSE      POCT Blood Glucose.: 253 mg/dL (09 Oct 2024 08:45)  POCT Blood Glucose.: 283 mg/dL (08 Oct 2024 20:56)  POCT Blood Glucose.: 280 mg/dL (08 Oct 2024 17:49)  POCT Blood Glucose.: 268 mg/dL (08 Oct 2024 11:29)      PHYSICAL EXAM:  General: NAD, alert and awake  HEENT: NCAT, EOMI, conjunctiva clear  Chest: nonlabored respirations, good inspiratory effort  Abdomen: soft, NTND.   Extremities: no pedal edema or calf tenderness noted   : Scrotum: Dressing taken down. mild slough, no further necrosis, cleansed with NS and redressed with Xeroform and betadine soaked gauze, Covered with DSD No CVA or SP tenderness.   indwelling Li catheter with Clear yellow urine    LABS:                        12.7   10.65 )-----------( 181      ( 09 Oct 2024 03:00 )             37.6   10-09    133[L]  |  98  |  17  ----------------------------<  304[H]  3.3[L]   |  27  |  0.80    Ca    8.8      09 Oct 2024 03:00  Phos  2.8     10-09  Mg     2.0     10-09    TPro  7.0  /  Alb  2.1[L]  /  TBili  0.4  /  DBili  x   /  AST  37  /  ALT  42  /  AlkPhos  77  10-09    I&O's Detail    08 Oct 2024 07:01  -  09 Oct 2024 07:00  --------------------------------------------------------  IN:    IV PiggyBack: 1000 mL    IV PiggyBack: 300 mL    IV PiggyBack: 150 mL    Oral Fluid: 120 mL  Total IN: 1570 mL    OUT:    Indwelling Catheter - Urethral (mL): 2775 mL  Total OUT: 2775 mL    Total NET: -1205 mL          Impression: 24y Male admitted with  PMH   No pertinent past medical history        Plan:

## 2024-10-09 NOTE — CONSULT NOTE ADULT - SUBJECTIVE AND OBJECTIVE BOX
Chief Complaint:  Patient is a 24y old  Male who presents with a chief complaint of Sepsis secondary to scrotal cellulitis vs. epididymitis (08 Oct 2024 23:22)      HPI:  Jose Low Jr. is a 24 year old male with no significant PMHx who presented to the ED on 10/6/24 for complaints of left testicular pain.    Patient reports he has been having left testicular pain since Thursday. Described as a burning sensation and severe pain to the point where he is unable to eat or sleep. Severity was 10/10. Did not take any analgesics for the pain. No trauma to area. Not currently sexually active and has never been. Came to the ER on 10/4/24 for further evaluation. WBC 17.47K at that time. U/A with trace ketones, proteinuria, negative leuks, negative nitrites, WBC 0, RBC 1, occasional bacteria, glucosuria. U/S scrotum and testicles without evidence of testicular torsion but with marked left scrotal wall thickening, edema, and mild hyperemia of left epididymal body and tail. Received ceftriaxone 500 mg IV x 1. Discharged home with prescription for doxycycline 100 mg BID. Spiking fevers with Tmax 101 and severe pain despite taking doxycycline and tylenol which prompted another visit to ER.     In the ED, Tmax 101.9, HR as elevated as 132, BP as elevated as 153/98. WBC 18.92K, blood glucose 340. U/A with ketonuria, proteinuria, negative nitrites, negative leuks, small blood, WBC 3, RBC 5, few bacteria, glucosuria. CT A/P with diffuse edema involving the scrotal skin with mild perineal extension. No focal gas collections. Received LR 2500 cc bolus, vancomycin 1500 mg IV, zosyn 3.375 g IV, clindamycin 900 mg IV, acetaminophen 975 mg PO, and morphine 4 mg IV. Evaluated by urology who recommended admission for IV antibiotics and does not recommend acute urologic intervention. (06 Oct 2024 20:16)      PMH/PSH:PAST MEDICAL & SURGICAL HISTORY:  No pertinent past medical history          Allergies:  No Known Allergies      Medications:  chlorhexidine 2% Cloths 1 Application(s) Topical <User Schedule>  clindamycin IVPB 900 milliGRAM(s) IV Intermittent every 8 hours  clindamycin IVPB      enoxaparin Injectable 40 milliGRAM(s) SubCutaneous every 12 hours  influenza   Vaccine 0.5 milliLiter(s) IntraMuscular once  insulin glargine Injectable (LANTUS) 10 Unit(s) SubCutaneous every morning  insulin lispro (ADMELOG) corrective regimen sliding scale   SubCutaneous three times a day before meals  insulin lispro (ADMELOG) corrective regimen sliding scale   SubCutaneous at bedtime  oxyCODONE    IR 5 milliGRAM(s) Oral every 6 hours PRN  pantoprazole  Injectable 40 milliGRAM(s) IV Push daily  piperacillin/tazobactam IVPB.. 3.375 Gram(s) IV Intermittent every 8 hours  vancomycin  IVPB 1500 milliGRAM(s) IV Intermittent every 8 hours      Review of Systems:  Negative except for HPI    Relevant Family History:   FAMILY HISTORY:      Relevant Social History: Alcohol ( -) , Tobacco ( -) , Illicit drugs (- )     Physical Exam:    Vital Signs:  Vital Signs Last 24 Hrs  T(C): 36.5 (09 Oct 2024 07:12), Max: 37.9 (08 Oct 2024 18:00)  T(F): 97.7 (09 Oct 2024 07:12), Max: 100.2 (08 Oct 2024 18:00)  HR: 76 (09 Oct 2024 08:00) (76 - 100)  BP: 131/88 (09 Oct 2024 08:00) (128/76 - 158/96)  BP(mean): 101 (09 Oct 2024 08:00) (88 - 121)  RR: 18 (09 Oct 2024 08:00) (13 - 31)  SpO2: 96% (09 Oct 2024 08:00) (96% - 100%)    Parameters below as of 08 Oct 2024 20:00  Patient On (Oxygen Delivery Method): room air      Daily     Daily Weight in k.4 (09 Oct 2024 04:00)    General:  Appears stated age, well-groomed, well-nourished, no distress  HEENT:  NC/AT,  conjunctivae clear and pink, no thyromegaly, nodules, adenopathy, no JVD, anicteric sclera  Chest:  Full & symmetric excursion, no increased effort, breath sounds clear  Cardiovascular:  Regular rhythm, S1, S2, no murmur/rub/S3/S4, no abdominal bruit, no edema  Abdomen:  Soft, non tender, non distended, normoactive bowel sounds,  no masses , no hepatosplenomegaly, no signs of chronic liver disease  Extremities:  no cyanosis, clubbing or edema  Skin:  No rash/erythema/ecchymoses/petechiae/wounds/abscess/warm/dry  Neuro/Psych:  Alert, oriented, no asterixis, no tremor, no encephalopathy  Scrotum: Dressing taken down. mild slough, no further necrosis, cleansed with NS and redressed with Xeroform and betadine soaked gauze, Covered with DSD     Laboratory:                          12.7   10.65 )-----------( 181      ( 09 Oct 2024 03:00 )             37.6     10    133[L]  |  98  |  17  ----------------------------<  304[H]  3.3[L]   |  27  |  0.80    Ca    8.8      09 Oct 2024 03:00  Phos  2.8     10-09  Mg     2.0     10-09    TPro  7.0  /  Alb  2.1[L]  /  TBili  0.4  /  DBili  x   /  AST  37  /  ALT  42  /  AlkPhos  77  10-09    LIVER FUNCTIONS - ( 09 Oct 2024 03:00 )  Alb: 2.1 g/dL / Pro: 7.0 gm/dL / ALK PHOS: 77 U/L / ALT: 42 U/L / AST: 37 U/L / GGT: x             Urinalysis Basic - ( 09 Oct 2024 03:00 )    Color: x / Appearance: x / SG: x / pH: x  Gluc: 304 mg/dL / Ketone: x  / Bili: x / Urobili: x   Blood: x / Protein: x / Nitrite: x   Leuk Esterase: x / RBC: x / WBC x   Sq Epi: x / Non Sq Epi: x / Bacteria: x          Intake and Output    10-08-24 @ 07:01  -  10-09-24 @ 07:00  --------------------------------------------------------  IN: 1570 mL / OUT: 2775 mL / NET: -1205 mL        Imaging:

## 2024-10-09 NOTE — PROGRESS NOTE ADULT - ASSESSMENT
24M with Paul's gangrene now POD#2 S/P scrotal exploration, excision/debridement of paul's gangrene  newly diagnosed DM, A1C 13    Plan:   local wound care, daily dressing change   DVT prophylaxis, Incentive Spirometer, OOB, Ambulating, pain control  Continue antibiotics: per ID  Li, monitor urine output  hyperbaric oxygen therapy to start today  Discussed with  attending

## 2024-10-09 NOTE — CONSULT NOTE ADULT - PROBLEM SELECTOR RECOMMENDATION 9
Continue with the current  regimen while inpatient   addition in 1-2 days once cleared by ID ( for resolving epididymitis)  of Metformin may help   meanwhile low dose prandial lispro can also be added  while inpatient, finger sticks should be 100-180   Thank You for the courtesy of this consultation !!!

## 2024-10-09 NOTE — CONSULT NOTE ADULT - SUBJECTIVE AND OBJECTIVE BOX
Patient is a 24y old  Male who presents with a chief complaint of Sepsis secondary to scrotal cellulitis vs. epididymitis (09 Oct 2024 17:03)      Reason For Consult: diabetes     HPI:  Jose Low Jr. is a 24 year old male with no significant PMHx who presented to the ED on 10/6/24 for complaints of left testicular pain.    Patient reports he has been having left testicular pain since Thursday. Described as a burning sensation and severe pain to the point where he is unable to eat or sleep. Severity was 10/10. Did not take any analgesics for the pain. No trauma to area. Not currently sexually active and has never been. Came to the ER on 10/4/24 for further evaluation. WBC 17.47K at that time. U/A with trace ketones, proteinuria, negative leuks, negative nitrites, WBC 0, RBC 1, occasional bacteria, glucosuria. U/S scrotum and testicles without evidence of testicular torsion but with marked left scrotal wall thickening, edema, and mild hyperemia of left epididymal body and tail. Received ceftriaxone 500 mg IV x 1. Discharged home with prescription for doxycycline 100 mg BID. Spiking fevers with Tmax 101 and severe pain despite taking doxycycline and tylenol which prompted another visit to ER.     In the ED, Tmax 101.9, HR as elevated as 132, BP as elevated as 153/98. WBC 18.92K, blood glucose 340. U/A with ketonuria, proteinuria, negative nitrites, negative leuks, small blood, WBC 3, RBC 5, few bacteria, glucosuria. CT A/P with diffuse edema involving the scrotal skin with mild perineal extension. No focal gas collections. Received LR 2500 cc bolus, vancomycin 1500 mg IV, zosyn 3.375 g IV, clindamycin 900 mg IV, acetaminophen 975 mg PO, and morphine 4 mg IV. Evaluated by urology who recommended admission for IV antibiotics and does not recommend acute urologic intervention. (06 Oct 2024 20:16)  Patient has epididymitis and has infection driven hyperglycemia or unmasking of his diabetes.  HbA1c 13.0, and currently has fingersticks running in the mid 200s as an average.  Only on Lantus 10 units and lispro coverage with meals.  Nutrition slightly improved.  Creatinine is normal    PAST MEDICAL & SURGICAL HISTORY:  No pertinent past medical history          FAMILY HISTORY:        Social History:    MEDICATIONS  (STANDING):  chlorhexidine 2% Cloths 1 Application(s) Topical <User Schedule>  enoxaparin Injectable 40 milliGRAM(s) SubCutaneous every 12 hours  influenza   Vaccine 0.5 milliLiter(s) IntraMuscular once  insulin glargine Injectable (LANTUS) 10 Unit(s) SubCutaneous every morning  insulin lispro (ADMELOG) corrective regimen sliding scale   SubCutaneous at bedtime  insulin lispro (ADMELOG) corrective regimen sliding scale   SubCutaneous three times a day before meals  linezolid  IVPB      pantoprazole  Injectable 40 milliGRAM(s) IV Push daily  piperacillin/tazobactam IVPB.. 3.375 Gram(s) IV Intermittent every 8 hours    MEDICATIONS  (PRN):  oxyCODONE    IR 5 milliGRAM(s) Oral every 6 hours PRN Severe Pain (7 - 10)      REVIEW OF SYSTEMS:  CONSTITUTIONAL:  as per HPI  HEENT:  Eyes:  No diplopia or blurred vision.     T(C): 36.7 (10-09-24 @ 20:00), Max: 37.2 (10-09-24 @ 05:20)  HR: 81 (10-09-24 @ 20:00) (76 - 110)  BP: 138/92 (10-09-24 @ 20:00) (129/98 - 158/96)  RR: 12 (10-09-24 @ 20:00) (12 - 26)  SpO2: 97% (10-09-24 @ 20:00) (95% - 100%)  Wt(kg): --    PHYSICAL EXAM:  GENERAL: NAD, well-groomed, well-developed  HEAD:  Atraumatic, Normocephalic  EYES: PERRLA, conjunctiva and sclera clear  ENMT: No  exudates,, Moist mucous membranes,, No lesions  NECK: Supple, No JVD,   NERVOUS SYSTEM:  Alert & Oriented   CHEST/LUNG: Clear to percussion bilaterally; No rales, rhonchi, wheezing, or rubs  HEART: Regular rate and rhythm; No murmurs, rubs, or gallops      CAPILLARY BLOOD GLUCOSE      POCT Blood Glucose.: 273 mg/dL (09 Oct 2024 17:27)  POCT Blood Glucose.: 263 mg/dL (09 Oct 2024 11:46)  POCT Blood Glucose.: 253 mg/dL (09 Oct 2024 08:45)  POCT Blood Glucose.: 283 mg/dL (08 Oct 2024 20:56)                            12.7   10.65 )-----------( 181      ( 09 Oct 2024 03:00 )             37.6       CMP:  10-09 @ 03:00  SGPT 42  Albumin 2.1   Alk Phos 77   Anion Gap 8   SGOT 37   Total Bili 0.4   BUN 17   Calcium Total 8.8   CO2 27   Chloride 98   Creatinine 0.80   eGFR if AA --   eGFR if non AA --   Glucose 304   Potassium 3.3   Protein 7.0   Sodium 133      Thyroid Function Tests:      Diabetes Tests:     Parathyroids:     Adrenals:       Radiology:

## 2024-10-10 LAB
ALBUMIN SERPL ELPH-MCNC: 2.3 G/DL — LOW (ref 3.3–5)
ALP SERPL-CCNC: 75 U/L — SIGNIFICANT CHANGE UP (ref 40–120)
ALT FLD-CCNC: 66 U/L — SIGNIFICANT CHANGE UP (ref 12–78)
ANION GAP SERPL CALC-SCNC: 6 MMOL/L — SIGNIFICANT CHANGE UP (ref 5–17)
AST SERPL-CCNC: 54 U/L — HIGH (ref 15–37)
BILIRUB SERPL-MCNC: 0.4 MG/DL — SIGNIFICANT CHANGE UP (ref 0.2–1.2)
BUN SERPL-MCNC: 17 MG/DL — SIGNIFICANT CHANGE UP (ref 7–23)
CALCIUM SERPL-MCNC: 8.6 MG/DL — SIGNIFICANT CHANGE UP (ref 8.5–10.1)
CHLORIDE SERPL-SCNC: 101 MMOL/L — SIGNIFICANT CHANGE UP (ref 96–108)
CO2 SERPL-SCNC: 26 MMOL/L — SIGNIFICANT CHANGE UP (ref 22–31)
CREAT SERPL-MCNC: 0.84 MG/DL — SIGNIFICANT CHANGE UP (ref 0.5–1.3)
EGFR: 123 ML/MIN/1.73M2 — SIGNIFICANT CHANGE UP
GLUCOSE BLDC GLUCOMTR-MCNC: 273 MG/DL — HIGH (ref 70–99)
GLUCOSE BLDC GLUCOMTR-MCNC: 287 MG/DL — HIGH (ref 70–99)
GLUCOSE BLDC GLUCOMTR-MCNC: 289 MG/DL — HIGH (ref 70–99)
GLUCOSE SERPL-MCNC: 333 MG/DL — HIGH (ref 70–99)
HCT VFR BLD CALC: 40.5 % — SIGNIFICANT CHANGE UP (ref 39–50)
HGB BLD-MCNC: 13.4 G/DL — SIGNIFICANT CHANGE UP (ref 13–17)
HIV-1 VIRAL LOAD RESULT: SIGNIFICANT CHANGE UP
HIV1 RNA # SERPL NAA+PROBE: SIGNIFICANT CHANGE UP COPIES/ML
HIV1 RNA SER-IMP: SIGNIFICANT CHANGE UP
HIV1 RNA SERPL NAA+PROBE-ACNC: SIGNIFICANT CHANGE UP
HIV1 RNA SERPL NAA+PROBE-LOG#: SIGNIFICANT CHANGE UP LG COP/ML
ISLET CELL512 AB SER-ACNC: SIGNIFICANT CHANGE UP
MAGNESIUM SERPL-MCNC: 1.9 MG/DL — SIGNIFICANT CHANGE UP (ref 1.6–2.6)
MCHC RBC-ENTMCNC: 28.4 PG — SIGNIFICANT CHANGE UP (ref 27–34)
MCHC RBC-ENTMCNC: 33.1 G/DL — SIGNIFICANT CHANGE UP (ref 32–36)
MCV RBC AUTO: 85.8 FL — SIGNIFICANT CHANGE UP (ref 80–100)
NRBC # BLD: 0 /100 WBCS — SIGNIFICANT CHANGE UP (ref 0–0)
PHOSPHATE SERPL-MCNC: 3 MG/DL — SIGNIFICANT CHANGE UP (ref 2.5–4.5)
PLATELET # BLD AUTO: 235 K/UL — SIGNIFICANT CHANGE UP (ref 150–400)
POTASSIUM SERPL-MCNC: 3.6 MMOL/L — SIGNIFICANT CHANGE UP (ref 3.5–5.3)
POTASSIUM SERPL-SCNC: 3.6 MMOL/L — SIGNIFICANT CHANGE UP (ref 3.5–5.3)
PROT SERPL-MCNC: 7.2 GM/DL — SIGNIFICANT CHANGE UP (ref 6–8.3)
RBC # BLD: 4.72 M/UL — SIGNIFICANT CHANGE UP (ref 4.2–5.8)
RBC # FLD: 13.2 % — SIGNIFICANT CHANGE UP (ref 10.3–14.5)
SODIUM SERPL-SCNC: 133 MMOL/L — LOW (ref 135–145)
T PALLIDUM AB TITR SER: NEGATIVE — SIGNIFICANT CHANGE UP
WBC # BLD: 9.5 K/UL — SIGNIFICANT CHANGE UP (ref 3.8–10.5)
WBC # FLD AUTO: 9.5 K/UL — SIGNIFICANT CHANGE UP (ref 3.8–10.5)

## 2024-10-10 PROCEDURE — 99232 SBSQ HOSP IP/OBS MODERATE 35: CPT

## 2024-10-10 PROCEDURE — 99183 HYPERBARIC OXYGEN THERAPY: CPT | Mod: 1L

## 2024-10-10 RX ORDER — HYDROMORPHONE HCL/0.9% NACL/PF 6 MG/30 ML
0.5 PATIENT CONTROLLED ANALGESIA SYRINGE INTRAVENOUS ONCE
Refills: 0 | Status: DISCONTINUED | OUTPATIENT
Start: 2024-10-10 | End: 2024-10-22

## 2024-10-10 RX ORDER — ACETAMINOPHEN 500 MG
975 TABLET ORAL EVERY 6 HOURS
Refills: 0 | Status: DISCONTINUED | OUTPATIENT
Start: 2024-10-10 | End: 2024-10-22

## 2024-10-10 RX ADMIN — Medication 300 MILLIGRAM(S): at 05:31

## 2024-10-10 RX ADMIN — PIPERACILLIN AND TAZOBACTAM 25 GRAM(S): .5; 4 INJECTION, POWDER, LYOPHILIZED, FOR SOLUTION INTRAVENOUS at 21:48

## 2024-10-10 RX ADMIN — Medication 1: at 21:47

## 2024-10-10 RX ADMIN — Medication 300 MILLIGRAM(S): at 17:44

## 2024-10-10 RX ADMIN — Medication 3: at 16:59

## 2024-10-10 RX ADMIN — Medication 40 MILLIGRAM(S): at 05:31

## 2024-10-10 RX ADMIN — Medication 40 MILLIGRAM(S): at 17:45

## 2024-10-10 RX ADMIN — PIPERACILLIN AND TAZOBACTAM 25 GRAM(S): .5; 4 INJECTION, POWDER, LYOPHILIZED, FOR SOLUTION INTRAVENOUS at 05:31

## 2024-10-10 RX ADMIN — Medication 3: at 08:34

## 2024-10-10 RX ADMIN — Medication 10 UNIT(S): at 08:35

## 2024-10-10 NOTE — PROGRESS NOTE ADULT - SUBJECTIVE AND OBJECTIVE BOX
Patient is a 24y old  Male who presents with a chief complaint of Sepsis secondary to scrotal cellulitis vs. epididymitis (10 Oct 2024 15:51)      Interval History: You on 10 units of Lantus and lispro coverage with meals.  Fingersticks are in the 250-300 range.  Creatinine normal    MEDICATIONS  (STANDING):  chlorhexidine 2% Cloths 1 Application(s) Topical <User Schedule>  enoxaparin Injectable 40 milliGRAM(s) SubCutaneous every 12 hours  HYDROmorphone  Injectable 0.5 milliGRAM(s) IV Push once  influenza   Vaccine 0.5 milliLiter(s) IntraMuscular once  insulin glargine Injectable (LANTUS) 10 Unit(s) SubCutaneous every morning  insulin lispro (ADMELOG) corrective regimen sliding scale   SubCutaneous at bedtime  insulin lispro (ADMELOG) corrective regimen sliding scale   SubCutaneous three times a day before meals  linezolid  IVPB      linezolid  IVPB 600 milliGRAM(s) IV Intermittent every 12 hours  pantoprazole  Injectable 40 milliGRAM(s) IV Push daily  piperacillin/tazobactam IVPB.. 3.375 Gram(s) IV Intermittent every 8 hours    MEDICATIONS  (PRN):  acetaminophen     Tablet .. 975 milliGRAM(s) Oral every 6 hours PRN Moderate Pain (4 - 6)  oxyCODONE    IR 5 milliGRAM(s) Oral every 6 hours PRN Severe Pain (7 - 10)      Allergies    No Known Allergies    Intolerances        REVIEW OF SYSTEMS:  CONSTITUTIONAL: no changes  EYES: No eye pain, visual disturbances, or discharge  ENMT:  No difficulty hearing, No sinus or throat pain  NECK: No pain or stiffness  RESPIRATORY: No cough, wheezing, chills or hemoptysis; No shortness of breath  CARDIOVASCULAR: No chest pain, palpitations or leg swelling  GASTROINTESTINAL: No abdominal or epigastric pain. No nausea, vomiting, or hematemesis; No diarrhea or constipation. No melena or hematochezia.  GENITOURINARY: No dysuria, frequency, hematuria, or incontinence  NEUROLOGICAL: No headaches, memory loss, loss of strength, numbness, or tremors  SKIN: No itching, burning, rashes, or lesions   ENDOCRINE: No heat or cold intolerance; No hair loss  MUSCULOSKELETAL: No joint pain or swelling; No muscle, back, or extremity pain  PSYCHIATRIC: No depression, anxiety, mood swings, or difficulty sleeping  HEME/LYMPH: No easy bruising, or bleeding gums  ALLERY AND IMMUNOLOGIC: No hives or eczema    Vital Signs Last 24 Hrs  T(C): 36.6 (10 Oct 2024 17:00), Max: 36.7 (09 Oct 2024 23:34)  T(F): 97.8 (10 Oct 2024 17:00), Max: 98.1 (09 Oct 2024 23:34)  HR: 69 (10 Oct 2024 17:00) (69 - 83)  BP: 157/94 (10 Oct 2024 17:00) (148/85 - 161/108)  BP(mean): --  RR: 18 (10 Oct 2024 17:00) (18 - 18)  SpO2: 98% (10 Oct 2024 17:00) (96% - 99%)    Parameters below as of 10 Oct 2024 17:00  Patient On (Oxygen Delivery Method): room air        PHYSICAL EXAM:  GENERAL:   HEAD: Atraumatic, Normocephalic  EYES: PERRLA, conjunctiva and sclera clear  ENMT: No  exudates,; Moist mucous membranes,, No lesions  NECK: Supple, No JVD, Normal thyroid  NERVOUS SYSTEM:  Alert & Oriented,   CHEST/LUNG: Clear to auscultation bilaterally; No rales, rhonchi, wheezing, or rubs  HEART: Regular rate and rhythm; No murmurs, rubs, or gallops  ABDOMEN: Soft, Nontender, Nondistended; Bowel sounds present  EXTREMITIES:  2+ Peripheral Pulses, no edema  SKIN: No rashes or lesions    LABS:      Urinalysis Basic - ( 10 Oct 2024 07:45 )    Color: x / Appearance: x / SG: x / pH: x  Gluc: 333 mg/dL / Ketone: x  / Bili: x / Urobili: x   Blood: x / Protein: x / Nitrite: x   Leuk Esterase: x / RBC: x / WBC x   Sq Epi: x / Non Sq Epi: x / Bacteria: x      CAPILLARY BLOOD GLUCOSE      POCT Blood Glucose.: 287 mg/dL (10 Oct 2024 21:08)  POCT Blood Glucose.: 273 mg/dL (10 Oct 2024 11:52)  POCT Blood Glucose.: 289 mg/dL (10 Oct 2024 07:49)    Lipid panel:           Thyroid:  Diabetes Tests:  Parathyroid Panel:  Adrenals:  RADIOLOGY & ADDITIONAL TESTS:    Imaging Personally Reviewed:  [ ] YES  [ ] NO    Consultant(s) Notes Reviewed:  [ ] YES  [ ] NO    Care Discussed with Consultants/Other Providers [ ] YES  [ ] NO

## 2024-10-10 NOTE — PROGRESS NOTE ADULT - ASSESSMENT
24 year old male with no significant PMHx who presented to the ED on 10/6/24 for complaints of left testicular pain. Pt admitted to medicine service for sepsis likely from a scrotal abscess. Urology following and decision was made to take pt to OR for I&D scrotal abscess. 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 Minimal EBL, given 1L IVF. No pressors administered. Pt extubated post-op with no complications. Transferred to ICU for hemodynamic monitoring.       Sepsis likely in the setting of paul's gangrene from scrotal abscess   - On Zosyn. Zyvox  - ID following   - urology following, will appreciate recs, wound healing with Hyperbaric oxygen  - dewitt in place, cont to monitor strict I/Os    ETHAN not on CPAP,  will use NIV if needed   -satting adequately on supplemental oxygen, maintain sats>92%       DM2:  - Newly diagnosed  - A1c 13  - maintaining goal glucose ~ 180 given increased sensetivity during Hyperbaric oxygen and avoiding hypoglycemia.  Still needs up titration and will need to start pre meals.   - c/w lantus 10, add premeals.

## 2024-10-10 NOTE — PROGRESS NOTE ADULT - SUBJECTIVE AND OBJECTIVE BOX
I called the patient who stated another nurse called him today and was addressed.   Has an inperson visit acute call on 12/16/2020 CHA MCCAIN JR  MRN-[id number]  24y (2000)    Follow Up:  paul's gangrene     Interval History: The pt was seen and examined earlier, not in acute distress, no new complaints, denies pain at the moment. Pt is afebrile since 10/8, RA, no WBC - normalized.     PAST MEDICAL & SURGICAL HISTORY:  No pertinent past medical history          ROS:    [ ] Unobtainable because:  [x ] All other systems negative    Constitutional: no fever, no chills  Head: no trauma  Eyes: no vision changes, no eye pain  ENT:  no sore throat, no rhinorrhea  Cardiovascular:  no chest pain, no palpitation  Respiratory:  no SOB, no cough  GI:  no abd pain, no vomiting, no diarrhea  urinary: no dysuria, no hematuria, no flank pain  musculoskeletal:  no joint pain, no joint swelling  skin:  no rash  neurology:  no headache, no seizure, no change in mental status  psych: no anxiety, no depression         Allergies  No Known Allergies        ANTIMICROBIALS:  linezolid  IVPB 600 every 12 hours  linezolid  IVPB    piperacillin/tazobactam IVPB.. 3.375 every 8 hours      OTHER MEDS:  acetaminophen     Tablet .. 975 milliGRAM(s) Oral every 6 hours PRN  chlorhexidine 2% Cloths 1 Application(s) Topical <User Schedule>  enoxaparin Injectable 40 milliGRAM(s) SubCutaneous every 12 hours  HYDROmorphone  Injectable 0.5 milliGRAM(s) IV Push once  influenza   Vaccine 0.5 milliLiter(s) IntraMuscular once  insulin glargine Injectable (LANTUS) 10 Unit(s) SubCutaneous every morning  insulin lispro (ADMELOG) corrective regimen sliding scale   SubCutaneous at bedtime  insulin lispro (ADMELOG) corrective regimen sliding scale   SubCutaneous three times a day before meals  oxyCODONE    IR 5 milliGRAM(s) Oral every 6 hours PRN  pantoprazole  Injectable 40 milliGRAM(s) IV Push daily      Vital Signs Last 24 Hrs  T(C): 36.7 (10 Oct 2024 11:59), Max: 36.7 (09 Oct 2024 20:00)  T(F): 98.1 (10 Oct 2024 11:59), Max: 98.1 (09 Oct 2024 23:34)  HR: 83 (10 Oct 2024 11:59) (76 - 110)  BP: 152/89 (10 Oct 2024 13:32) (138/92 - 161/108)  BP(mean): 104 (09 Oct 2024 20:00) (104 - 110)  RR: 18 (10 Oct 2024 11:59) (12 - 18)  SpO2: 97% (10 Oct 2024 11:59) (96% - 100%)    Parameters below as of 10 Oct 2024 11:59  Patient On (Oxygen Delivery Method): room air        Physical Exam:  Constitutional: non-toxic, no distress  HEAD/EYES: anicteric, no conjunctival injection  ENT:  supple, no thrush  Cardiovascular:   normal S1, S2, no murmur, no edema  Respiratory:  clear BS bilaterally, no wheezes, no rales  GI:  soft, non-tender, normal bowel sounds  :  +dewitt, no CVA tenderness, scrotal area is dressed - dressing is due to be changed.   Musculoskeletal:  no synovitis, normal ROM  Neurologic: awake and alert, normal strength, no focal findings  Skin:  no rash, no erythema, no phlebitis  Heme/Onc: no lymphadenopathy   Psychiatric:  awake, alert, appropriate mood    WBC Count: 9.50 K/uL (10-10 @ 07:45)  WBC Count: 10.65 K/uL (10-09 @ 03:00)  WBC Count: 14.48 K/uL (10-08 @ 03:00)  WBC Count: 13.64 K/uL (10-07 @ 17:47)  WBC Count: 16.36 K/uL (10-07 @ 08:07)  WBC Count: 18.92 K/uL (10-06 @ 14:28)  WBC Count: 17.47 K/uL (10-04 @ 18:06)                            13.4   9.50  )-----------( 235      ( 10 Oct 2024 07:45 )             40.5       10-10    133[L]  |  101  |  17  ----------------------------<  333[H]  3.6   |  26  |  0.84    Ca    8.6      10 Oct 2024 07:45  Phos  3.0     10-10  Mg     1.9     10-10    TPro  7.2  /  Alb  2.3[L]  /  TBili  0.4  /  DBili  x   /  AST  54[H]  /  ALT  66  /  AlkPhos  75  10-10      Urinalysis Basic - ( 10 Oct 2024 07:45 )    Color: x / Appearance: x / SG: x / pH: x  Gluc: 333 mg/dL / Ketone: x  / Bili: x / Urobili: x   Blood: x / Protein: x / Nitrite: x   Leuk Esterase: x / RBC: x / WBC x   Sq Epi: x / Non Sq Epi: x / Bacteria: x        Creatinine Trend: 0.84<--, 0.80<--, 1.00<--, 1.17<--, 1.05<--, 1.12<--      MICROBIOLOGY:  v  .Surgical Swab  10-07-24   No growth to date.  --  --      Clean Catch Clean Catch (Midstream)  10-06-24   <10,000 CFU/mL Normal Urogenital Henny  --  --      .Blood BLOOD  10-06-24   No growth at 72 Hours  --  --      .Blood BLOOD  10-06-24   No growth at 72 Hours  --  --      Clean Catch Clean Catch (Midstream)  10-04-24   <10,000 CFU/mL Normal Urogenital Henny  --  --        HIV-1 RNA Quantitative, Viral Load Log: NOT DET. lg /mL (10-09-24 @ 03:00)          C-Reactive Protein: 231 (10-09)            SARS-CoV-2 Result: NotDetec (10-06-24 @ 14:28)      RADIOLOGY:

## 2024-10-10 NOTE — PROGRESS NOTE ADULT - SUBJECTIVE AND OBJECTIVE BOX
Patient seen and examined bedside resting comfortably.  No complaints offered. .  Denies nausea vomitin, diarrhea, fevers, chills, abd pain.  Tolerating diet. ambulating w/o difficulty       T(F): 98.1 (10-10-24 @ 11:59), Max: 98.1 (10-09-24 @ 23:34)  HR: 83 (10-10-24 @ 11:59) (76 - 110)  BP: 152/89 (10-10-24 @ 13:32) (138/92 - 161/108)  RR: 18 (10-10-24 @ 11:59) (12 - 18)  SpO2: 97% (10-10-24 @ 11:59) (96% - 100%)  Wt(kg): --  CAPILLARY BLOOD GLUCOSE      POCT Blood Glucose.: 273 mg/dL (10 Oct 2024 11:52)  POCT Blood Glucose.: 289 mg/dL (10 Oct 2024 07:49)  POCT Blood Glucose.: 311 mg/dL (09 Oct 2024 22:06)  POCT Blood Glucose.: 273 mg/dL (09 Oct 2024 17:27)      PHYSICAL EXAM:  General: NAD, alert and awake  HEENT: NCAT, EOMI, conjunctiva clear  Chest: nonlabored respirations, good inspiratory effort  Abdomen: soft, NTND.   Extremities: no pedal edema or calf tenderness noted   : uncircumcised phallus, adequate meatus with dewitt catheter draining clear yellow urine.   scrotal wound dressing taken down, minimal slough. cleansed with saline. packed with 2inch iodoform covered with guaze and abd, and placed clean underwear     LABS:                        13.4   9.50  )-----------( 235      ( 10 Oct 2024 07:45 )             40.5   10-10    133[L]  |  101  |  17  ----------------------------<  333[H]  3.6   |  26  |  0.84    Ca    8.6      10 Oct 2024 07:45  Phos  3.0     10-10  Mg     1.9     10-10    TPro  7.2  /  Alb  2.3[L]  /  TBili  0.4  /  DBili  x   /  AST  54[H]  /  ALT  66  /  AlkPhos  75  10-10    I&O's Detail    09 Oct 2024 07:01  -  10 Oct 2024 07:00  --------------------------------------------------------  IN:    IV PiggyBack: 600 mL    IV PiggyBack: 300 mL    Oral Fluid: 150 mL  Total IN: 1050 mL    OUT:    Indwelling Catheter - Urethral (mL): 2220 mL  Total OUT: 2220 mL    Total NET: -1170 mL      A/P; 24M with Paul's gangrene now POD#3 S/P scrotal exploration, excision/debridement of paul's gangrene  newly diagnosed DM, A1C 13  -c/w antibiotics per ID. Follow up official recs for d/c   -c/w daily dressing changes per surgical team  -c/w mulitmodal analgesics  -c/w diabetic management per primary team. appreciate endocrine's note.  -case discussed with Dr Ricketts

## 2024-10-10 NOTE — PROGRESS NOTE ADULT - SUBJECTIVE AND OBJECTIVE BOX
Patient is a 24y old  Male who presents with a chief complaint of Sepsis secondary to scrotal cellulitis vs. epididymitis (09 Oct 2024 20:47)      INTERVAL HPI/OVERNIGHT EVENTS:  Pt was seen and examined, no acute events.      MEDICATIONS  (STANDING):  chlorhexidine 2% Cloths 1 Application(s) Topical <User Schedule>  enoxaparin Injectable 40 milliGRAM(s) SubCutaneous every 12 hours  HYDROmorphone  Injectable 0.5 milliGRAM(s) IV Push once  influenza   Vaccine 0.5 milliLiter(s) IntraMuscular once  insulin glargine Injectable (LANTUS) 10 Unit(s) SubCutaneous every morning  insulin lispro (ADMELOG) corrective regimen sliding scale   SubCutaneous at bedtime  insulin lispro (ADMELOG) corrective regimen sliding scale   SubCutaneous three times a day before meals  linezolid  IVPB      linezolid  IVPB 600 milliGRAM(s) IV Intermittent every 12 hours  pantoprazole  Injectable 40 milliGRAM(s) IV Push daily  piperacillin/tazobactam IVPB.. 3.375 Gram(s) IV Intermittent every 8 hours    MEDICATIONS  (PRN):  acetaminophen     Tablet .. 975 milliGRAM(s) Oral every 6 hours PRN Moderate Pain (4 - 6)  oxyCODONE    IR 5 milliGRAM(s) Oral every 6 hours PRN Severe Pain (7 - 10)      Allergies    No Known Allergies    Intolerances          Vital Signs Last 24 Hrs  T(C): 36.7 (10 Oct 2024 11:59), Max: 36.7 (09 Oct 2024 20:00)  T(F): 98.1 (10 Oct 2024 11:59), Max: 98.1 (09 Oct 2024 23:34)  HR: 83 (10 Oct 2024 11:59) (76 - 110)  BP: 161/108 (10 Oct 2024 11:59) (138/92 - 161/108)  BP(mean): 104 (09 Oct 2024 20:00) (104 - 110)  RR: 18 (10 Oct 2024 11:59) (12 - 18)  SpO2: 97% (10 Oct 2024 11:59) (96% - 100%)    Parameters below as of 10 Oct 2024 11:59  Patient On (Oxygen Delivery Method): room air        PHYSICAL EXAM:  GENERAL: NAD, lying in bed comfortably  ENT: B/L tonsillar enlargement  HEART: Regular rate and rhythm  LUNGS: Unlabored respirations.  Clear to auscultation bilaterally, no crackles, wheezing, or rhonchi  ABDOMEN: Soft, nontender, nondistended  EXTREMITIES: No clubbing, cyanosis, or edema  scrotal dressing in place  NERVOUS SYSTEM:  A&Ox3      LABS:                        13.4   9.50  )-----------( 235      ( 10 Oct 2024 07:45 )             40.5     10-10    133[L]  |  101  |  17  ----------------------------<  333[H]  3.6   |  26  |  0.84    Ca    8.6      10 Oct 2024 07:45  Phos  3.0     10-10  Mg     1.9     10-10    TPro  7.2  /  Alb  2.3[L]  /  TBili  0.4  /  DBili  x   /  AST  54[H]  /  ALT  66  /  AlkPhos  75  10-10      Urinalysis Basic - ( 10 Oct 2024 07:45 )    Color: x / Appearance: x / SG: x / pH: x  Gluc: 333 mg/dL / Ketone: x  / Bili: x / Urobili: x   Blood: x / Protein: x / Nitrite: x   Leuk Esterase: x / RBC: x / WBC x   Sq Epi: x / Non Sq Epi: x / Bacteria: x      CAPILLARY BLOOD GLUCOSE      POCT Blood Glucose.: 273 mg/dL (10 Oct 2024 11:52)  POCT Blood Glucose.: 289 mg/dL (10 Oct 2024 07:49)  POCT Blood Glucose.: 311 mg/dL (09 Oct 2024 22:06)  POCT Blood Glucose.: 273 mg/dL (09 Oct 2024 17:27)      Culture - Wound Aerobic/Anaerobic (collected 07 Oct 2024 19:31)  Source: .Surgical Swab  Preliminary Report (09 Oct 2024 08:10):    No growth to date.    Culture - Urine (collected 06 Oct 2024 14:51)  Source: Clean Catch Clean Catch (Midstream)  Final Report (07 Oct 2024 14:44):    <10,000 CFU/mL Normal Urogenital Henny    Culture - Blood (collected 06 Oct 2024 14:28)  Source: .Blood BLOOD  Preliminary Report (09 Oct 2024 19:00):    No growth at 72 Hours    Culture - Blood (collected 06 Oct 2024 13:57)  Source: .Blood BLOOD  Preliminary Report (09 Oct 2024 19:00):    No growth at 72 Hours      RADIOLOGY & ADDITIONAL TESTS:    Imaging Personally Reviewed:  [ ] YES  [ ] NO    Consultant(s) Notes Reviewed:  [ ] YES  [ ] NO    Care Discussed with Consultants/Other Providers [ ] YES  [ ] NO

## 2024-10-10 NOTE — PROGRESS NOTE ADULT - ASSESSMENT
24M with Paul's gangrene now POD#1 S/P scrotal exploration, excision/debridement of paul's gangrene  see brief op report which showed:  purulent drainage upon entering scrotum, devitalized tissue excised, slough debrided from b/l scrotum  had fever leukocytosis, tachycardia     10/10: downgraded to the medical floor, no fevers since 10/8, RA, WBC normalized, Cr ok, BCs, UC and surgical culture NGTD, Vancomycin and clindamycin were stopped, Zosyn IV and Linezolid continued.     #Paul's gangrene   #Cellulitis   #Sepsis  #newly Diabetes mellitus type 2  #STD screening     Plan:  off Vancomycin  off Clindamycin   continue (Zosyn) Piperacillin/tazobactam 3.375 grams every 8 hours   was started on Linezolid yesterday   local wound care, daily dressing change per surgical team   DVT prophylaxis, Incentive Spirometer, OOB, pain control  Li, monitor urine output  trend wbc count   hyperbaric oxygen therapy per vascular / wound care team   send HIV - ordered, VL negative   Gc/chlamydia - negative   syphilis screen pending   newly diagnosed DM2-needs good insulin regimen, endocrinology consult is appreciated   recognize he is insulin naive and could be very sensitive to insulin doses and need to be mindful of doses to avoid hypoglycemic episodes   urology follow up is appreciated  surgery / wound care follow up for dressing change     will discuss with Dr. Briones

## 2024-10-11 LAB
CULTURE RESULTS: NO GROWTH — SIGNIFICANT CHANGE UP
CULTURE RESULTS: SIGNIFICANT CHANGE UP
CULTURE RESULTS: SIGNIFICANT CHANGE UP
GLUCOSE BLDC GLUCOMTR-MCNC: 219 MG/DL — HIGH (ref 70–99)
GLUCOSE BLDC GLUCOMTR-MCNC: 267 MG/DL — HIGH (ref 70–99)
GLUCOSE BLDC GLUCOMTR-MCNC: 276 MG/DL — HIGH (ref 70–99)
GLUCOSE BLDC GLUCOMTR-MCNC: 286 MG/DL — HIGH (ref 70–99)
HIV 1+2 AB+HIV1 P24 AG SERPL QL IA: SIGNIFICANT CHANGE UP
N GONORRHOEA RRNA SPEC QL NAA+PROBE: SIGNIFICANT CHANGE UP
SPECIMEN SOURCE: SIGNIFICANT CHANGE UP

## 2024-10-11 PROCEDURE — 99183 HYPERBARIC OXYGEN THERAPY: CPT | Mod: 1L

## 2024-10-11 PROCEDURE — 99232 SBSQ HOSP IP/OBS MODERATE 35: CPT

## 2024-10-11 RX ORDER — METFORMIN HYDROCHLORIDE 500 MG/1
500 TABLET, EXTENDED RELEASE ORAL DAILY
Refills: 0 | Status: DISCONTINUED | OUTPATIENT
Start: 2024-10-11 | End: 2024-10-22

## 2024-10-11 RX ORDER — INSULIN GLARGINE,HUM.REC.ANLOG 100/ML
15 VIAL (ML) SUBCUTANEOUS EVERY MORNING
Refills: 0 | Status: DISCONTINUED | OUTPATIENT
Start: 2024-10-11 | End: 2024-10-12

## 2024-10-11 RX ORDER — INSULIN LISPRO 100/ML
5 VIAL (ML) SUBCUTANEOUS
Refills: 0 | Status: DISCONTINUED | OUTPATIENT
Start: 2024-10-11 | End: 2024-10-12

## 2024-10-11 RX ADMIN — Medication 3: at 08:01

## 2024-10-11 RX ADMIN — PIPERACILLIN AND TAZOBACTAM 25 GRAM(S): .5; 4 INJECTION, POWDER, LYOPHILIZED, FOR SOLUTION INTRAVENOUS at 05:11

## 2024-10-11 RX ADMIN — Medication 300 MILLIGRAM(S): at 05:12

## 2024-10-11 RX ADMIN — OXYCODONE HYDROCHLORIDE 5 MILLIGRAM(S): 30 TABLET ORAL at 08:04

## 2024-10-11 RX ADMIN — CHLORHEXIDINE GLUCONATE 1 APPLICATION(S): 40 SOLUTION TOPICAL at 08:01

## 2024-10-11 RX ADMIN — PANTOPRAZOLE SODIUM 40 MILLIGRAM(S): 40 TABLET, DELAYED RELEASE ORAL at 13:01

## 2024-10-11 RX ADMIN — OXYCODONE HYDROCHLORIDE 5 MILLIGRAM(S): 30 TABLET ORAL at 09:00

## 2024-10-11 RX ADMIN — Medication 40 MILLIGRAM(S): at 17:09

## 2024-10-11 RX ADMIN — Medication 3: at 16:50

## 2024-10-11 RX ADMIN — Medication 1: at 22:08

## 2024-10-11 RX ADMIN — PIPERACILLIN AND TAZOBACTAM 25 GRAM(S): .5; 4 INJECTION, POWDER, LYOPHILIZED, FOR SOLUTION INTRAVENOUS at 22:22

## 2024-10-11 RX ADMIN — Medication 5 UNIT(S): at 16:50

## 2024-10-11 RX ADMIN — Medication 40 MILLIGRAM(S): at 05:12

## 2024-10-11 RX ADMIN — PIPERACILLIN AND TAZOBACTAM 25 GRAM(S): .5; 4 INJECTION, POWDER, LYOPHILIZED, FOR SOLUTION INTRAVENOUS at 13:01

## 2024-10-11 RX ADMIN — Medication 300 MILLIGRAM(S): at 17:09

## 2024-10-11 RX ADMIN — Medication 10 UNIT(S): at 07:59

## 2024-10-11 RX ADMIN — Medication 2: at 12:56

## 2024-10-11 NOTE — PROGRESS NOTE ADULT - SUBJECTIVE AND OBJECTIVE BOX
Patient is a 24y old  Male who presents with a chief complaint of Sepsis secondary to scrotal cellulitis vs. epididymitis (11 Oct 2024 16:44)      Interval History: finger sticks are in 200s   on prandial lispro 5 units and Lantus 15 units   Creatinine normal and C-Peptide 3.7     MEDICATIONS  (STANDING):  chlorhexidine 2% Cloths 1 Application(s) Topical <User Schedule>  enoxaparin Injectable 40 milliGRAM(s) SubCutaneous every 12 hours  HYDROmorphone  Injectable 0.5 milliGRAM(s) IV Push once  influenza   Vaccine 0.5 milliLiter(s) IntraMuscular once  insulin glargine Injectable (LANTUS) 15 Unit(s) SubCutaneous every morning  insulin lispro (ADMELOG) corrective regimen sliding scale   SubCutaneous three times a day before meals  insulin lispro (ADMELOG) corrective regimen sliding scale   SubCutaneous at bedtime  insulin lispro Injectable (ADMELOG) 5 Unit(s) SubCutaneous three times a day before meals  linezolid  IVPB 600 milliGRAM(s) IV Intermittent every 12 hours  linezolid  IVPB      pantoprazole  Injectable 40 milliGRAM(s) IV Push daily  piperacillin/tazobactam IVPB.. 3.375 Gram(s) IV Intermittent every 8 hours    MEDICATIONS  (PRN):  acetaminophen     Tablet .. 975 milliGRAM(s) Oral every 6 hours PRN Moderate Pain (4 - 6)  oxyCODONE    IR 5 milliGRAM(s) Oral every 6 hours PRN Severe Pain (7 - 10)      Allergies    No Known Allergies    Intolerances        REVIEW OF SYSTEMS:  CONSTITUTIONAL: no changes  EYES: No eye pain, visual disturbances, or discharge  ENMT:  No difficulty hearing, No sinus or throat pain  NECK: No pain or stiffness  RESPIRATORY: No cough, wheezing, chills or hemoptysis; No shortness of breath  CARDIOVASCULAR: No chest pain, palpitations or leg swelling  GASTROINTESTINAL: No abdominal or epigastric pain. No nausea, vomiting, or hematemesis; No diarrhea or constipation. No melena or hematochezia.  GENITOURINARY: No dysuria, frequency, hematuria, or incontinence  NEUROLOGICAL: No headaches, memory loss, loss of strength, numbness, or tremors  SKIN: No itching, burning, rashes, or lesions   ENDOCRINE: No heat or cold intolerance; No hair loss  MUSCULOSKELETAL: No joint pain or swelling; No muscle, back, or extremity pain  PSYCHIATRIC: No depression, anxiety, mood swings, or difficulty sleeping  HEME/LYMPH: No easy bruising, or bleeding gums  ALLERY AND IMMUNOLOGIC: No hives or eczema    Vital Signs Last 24 Hrs  T(C): 36.6 (11 Oct 2024 17:00), Max: 37.2 (10 Oct 2024 23:16)  T(F): 97.9 (11 Oct 2024 17:00), Max: 98.9 (10 Oct 2024 23:16)  HR: 88 (11 Oct 2024 17:00) (68 - 88)  BP: 169/98 (11 Oct 2024 17:00) (146/91 - 169/98)  BP(mean): --  RR: 18 (11 Oct 2024 17:00) (15 - 18)  SpO2: 100% (11 Oct 2024 17:00) (96% - 100%)    Parameters below as of 11 Oct 2024 17:00  Patient On (Oxygen Delivery Method): room air        PHYSICAL EXAM:  GENERAL:   HEAD: Atraumatic, Normocephalic  EYES: PERRLA, conjunctiva and sclera clear  ENMT: No  exudates,; Moist mucous membranes,, No lesions  NECK: Supple, No JVD, Normal thyroid  NERVOUS SYSTEM:  Alert & Oriented,   CHEST/LUNG: Clear to auscultation bilaterally; No rales, rhonchi, wheezing, or rubs  HEART: Regular rate and rhythm; No murmurs, rubs, or gallops  ABDOMEN: Soft, Nontender, Nondistended; Bowel sounds present  EXTREMITIES:  2+ Peripheral Pulses, no edema  SKIN: No rashes or lesions    LABS:      Urinalysis Basic - ( 10 Oct 2024 07:45 )    Color: x / Appearance: x / SG: x / pH: x  Gluc: 333 mg/dL / Ketone: x  / Bili: x / Urobili: x   Blood: x / Protein: x / Nitrite: x   Leuk Esterase: x / RBC: x / WBC x   Sq Epi: x / Non Sq Epi: x / Bacteria: x      CAPILLARY BLOOD GLUCOSE      POCT Blood Glucose.: 267 mg/dL (11 Oct 2024 21:19)  POCT Blood Glucose.: 286 mg/dL (11 Oct 2024 16:25)  POCT Blood Glucose.: 219 mg/dL (11 Oct 2024 12:55)  POCT Blood Glucose.: 276 mg/dL (11 Oct 2024 07:54)    Lipid panel:           Thyroid:  Diabetes Tests:  Parathyroid Panel:  Adrenals:  RADIOLOGY & ADDITIONAL TESTS:    Imaging Personally Reviewed:  [ ] YES  [ ] NO    Consultant(s) Notes Reviewed:  [ ] YES  [ ] NO    Care Discussed with Consultants/Other Providers [ ] YES  [ ] NO

## 2024-10-11 NOTE — PROGRESS NOTE ADULT - ASSESSMENT
24M with Paul's gangrene now POD#1 S/P scrotal exploration, excision/debridement of paul's gangrene  see brief op report which showed:  purulent drainage upon entering scrotum, devitalized tissue excised, slough debrided from b/l scrotum  had fever leukocytosis, tachycardia     10/10: downgraded to the medical floor, no fevers since 10/8, RA, WBC normalized, Cr ok, BCs, UC and surgical culture NGTD, Vancomycin and clindamycin were stopped, Zosyn IV and Linezolid continued.   10/11: no fever, RA,  no new cbc, Cr ok surgical cx and BCs NGTD, the wound is being monitored by urology / surgery, possibly will need further debridement next week, pending hospital course. Linezolid and Zosyn IV continued.     #Paul's gangrene   #Cellulitis   #Sepsis  #newly Diabetes mellitus type 2  #STD screening     Plan:  continue (Zosyn) Piperacillin/tazobactam 3.375 grams every 8 hours   continue Linezolid    local wound care, daily dressing change per surgical team - pt possibly will need further wound debridement next week  DVT prophylaxis, Incentive Spirometer, OOB, pain control  trend wbc count   hyperbaric oxygen therapy per vascular / wound care team   HIV screen is pending, VL negative   Gc/chlamydia - negative   syphilis screen negative   newly diagnosed DM2-needs good insulin regimen, endocrinology consult is appreciated   recognize he is insulin naive and could be very sensitive to insulin doses and need to be mindful of doses to avoid hypoglycemic episodes   urology follow up is appreciated    discussed with Dr. Briones  discussed with JAY

## 2024-10-11 NOTE — PROGRESS NOTE ADULT - SUBJECTIVE AND OBJECTIVE BOX
Patient is a 24y old  Male who presents with a chief complaint of Sepsis secondary to scrotal cellulitis vs. epididymitis (10 Oct 2024 23:14)      INTERVAL HPI/OVERNIGHT EVENTS:  Pt was seen and examined, no acute events.      MEDICATIONS  (STANDING):  chlorhexidine 2% Cloths 1 Application(s) Topical <User Schedule>  enoxaparin Injectable 40 milliGRAM(s) SubCutaneous every 12 hours  HYDROmorphone  Injectable 0.5 milliGRAM(s) IV Push once  influenza   Vaccine 0.5 milliLiter(s) IntraMuscular once  insulin glargine Injectable (LANTUS) 10 Unit(s) SubCutaneous every morning  insulin lispro (ADMELOG) corrective regimen sliding scale   SubCutaneous at bedtime  insulin lispro (ADMELOG) corrective regimen sliding scale   SubCutaneous three times a day before meals  linezolid  IVPB      linezolid  IVPB 600 milliGRAM(s) IV Intermittent every 12 hours  pantoprazole  Injectable 40 milliGRAM(s) IV Push daily  piperacillin/tazobactam IVPB.. 3.375 Gram(s) IV Intermittent every 8 hours    MEDICATIONS  (PRN):  acetaminophen     Tablet .. 975 milliGRAM(s) Oral every 6 hours PRN Moderate Pain (4 - 6)  oxyCODONE    IR 5 milliGRAM(s) Oral every 6 hours PRN Severe Pain (7 - 10)      Allergies  No Known Allergies        Vital Signs Last 24 Hrs  T(C): 36.6 (11 Oct 2024 05:16), Max: 37.2 (10 Oct 2024 23:16)  T(F): 97.9 (11 Oct 2024 05:16), Max: 98.9 (10 Oct 2024 23:16)  HR: 70 (11 Oct 2024 05:16) (69 - 70)  BP: 158/93 (11 Oct 2024 05:16) (147/95 - 158/93)  BP(mean): --  RR: 18 (11 Oct 2024 05:16) (18 - 18)  SpO2: 97% (11 Oct 2024 05:16) (96% - 98%)    Parameters below as of 11 Oct 2024 05:16  Patient On (Oxygen Delivery Method): room air        PHYSICAL EXAM:  GENERAL: NAD, lying in bed comfortably  ENT: B/L tonsillar enlargement  HEART: Regular rate and rhythm  LUNGS: Unlabored respirations.  Clear to auscultation bilaterally, no crackles, wheezing, or rhonchi  ABDOMEN: Soft, nontender, nondistended  EXTREMITIES: No clubbing, cyanosis, or edema  scrotal dressing in place  NERVOUS SYSTEM:  A&Ox3        LABS:                        13.4   9.50  )-----------( 235      ( 10 Oct 2024 07:45 )             40.5     10-10    133[L]  |  101  |  17  ----------------------------<  333[H]  3.6   |  26  |  0.84    Ca    8.6      10 Oct 2024 07:45  Phos  3.0     10-10  Mg     1.9     10-10    TPro  7.2  /  Alb  2.3[L]  /  TBili  0.4  /  DBili  x   /  AST  54[H]  /  ALT  66  /  AlkPhos  75  10-10      Urinalysis Basic - ( 10 Oct 2024 07:45 )    Color: x / Appearance: x / SG: x / pH: x  Gluc: 333 mg/dL / Ketone: x  / Bili: x / Urobili: x   Blood: x / Protein: x / Nitrite: x   Leuk Esterase: x / RBC: x / WBC x   Sq Epi: x / Non Sq Epi: x / Bacteria: x      CAPILLARY BLOOD GLUCOSE      POCT Blood Glucose.: 219 mg/dL (11 Oct 2024 12:55)  POCT Blood Glucose.: 276 mg/dL (11 Oct 2024 07:54)  POCT Blood Glucose.: 287 mg/dL (10 Oct 2024 21:08)      Culture - Wound Aerobic/Anaerobic (collected 07 Oct 2024 19:31)  Source: .Surgical Swab  Preliminary Report (09 Oct 2024 08:10):    No growth to date.    Culture - Urine (collected 06 Oct 2024 14:51)  Source: Clean Catch Clean Catch (Midstream)  Final Report (07 Oct 2024 14:44):    <10,000 CFU/mL Normal Urogenital Henny    Culture - Blood (collected 06 Oct 2024 14:28)  Source: .Blood BLOOD  Preliminary Report (10 Oct 2024 19:01):    No growth at 4 days    Culture - Blood (collected 06 Oct 2024 13:57)  Source: .Blood BLOOD  Preliminary Report (10 Oct 2024 19:01):    No growth at 4 days      RADIOLOGY & ADDITIONAL TESTS:    Imaging Personally Reviewed:  [ ] YES  [ ] NO    Consultant(s) Notes Reviewed:  [ ] YES  [ ] NO    Care Discussed with Consultants/Other Providers [ ] YES  [ ] NO

## 2024-10-11 NOTE — PROGRESS NOTE ADULT - ASSESSMENT
24 year old male with no significant PMHx who presented to the ED on 10/6/24 for complaints of left testicular pain. Pt admitted to medicine service for sepsis likely from a scrotal abscess. Urology following and decision was made to take pt to OR for I&D scrotal abscess. 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 Minimal EBL, given 1L IVF. No pressors administered. Pt extubated post-op with no complications. Transferred to ICU for hemodynamic monitoring.       Sepsis likely in the setting of paul's gangrene from scrotal abscess   - On Zosyn and Zyvox  - ID following   - urology following, will appreciate recs, wound healing with Hyperbaric oxygen  - dewitt removed, voiding     ETHAN not on CPAP,  will use NIV if needed   -satting adequately on supplemental oxygen, maintain sats>92%       DM2:  - Newly diagnosed  - A1c 13  - maintaining goal glucose ~ 180 given increased sensitivity during Hyperbaric oxygen and avoiding hypoglycemia.    - Increase Lantus to 15U, add pre meal insulin 5 U

## 2024-10-11 NOTE — PROGRESS NOTE ADULT - SUBJECTIVE AND OBJECTIVE BOX
CHA MCCAIN JR  MRN-[id number]  24y (2000)    Follow Up:  paul's gangrene     Interval History: The pt was seen and examined earlier, not in acute distress, no new complaints, awake and alert, appropriate. Pt is afebrile, RA, no new WBC.     PAST MEDICAL & SURGICAL HISTORY:  No pertinent past medical history          ROS:    [ ] Unobtainable because:  [x] All other systems negative    Constitutional: no fever, no chills  Head: no trauma  Eyes: no vision changes, no eye pain  ENT:  no sore throat, no rhinorrhea  Cardiovascular:  no chest pain, no palpitation  Respiratory:  no SOB, no cough  GI:  no abd pain, no vomiting, no diarrhea  urinary: no dysuria, no hematuria, no flank pain  musculoskeletal:  no joint pain, no joint swelling  skin:  no rash  neurology:  no headache, no seizure, no change in mental status  psych: no anxiety, no depression         Allergies  No Known Allergies        ANTIMICROBIALS:  linezolid  IVPB 600 every 12 hours  linezolid  IVPB    piperacillin/tazobactam IVPB.. 3.375 every 8 hours      OTHER MEDS:  acetaminophen     Tablet .. 975 milliGRAM(s) Oral every 6 hours PRN  chlorhexidine 2% Cloths 1 Application(s) Topical <User Schedule>  enoxaparin Injectable 40 milliGRAM(s) SubCutaneous every 12 hours  HYDROmorphone  Injectable 0.5 milliGRAM(s) IV Push once  influenza   Vaccine 0.5 milliLiter(s) IntraMuscular once  insulin glargine Injectable (LANTUS) 15 Unit(s) SubCutaneous every morning  insulin lispro (ADMELOG) corrective regimen sliding scale   SubCutaneous at bedtime  insulin lispro (ADMELOG) corrective regimen sliding scale   SubCutaneous three times a day before meals  insulin lispro Injectable (ADMELOG) 5 Unit(s) SubCutaneous three times a day before meals  oxyCODONE    IR 5 milliGRAM(s) Oral every 6 hours PRN  pantoprazole  Injectable 40 milliGRAM(s) IV Push daily      Vital Signs Last 24 Hrs  T(C): 36.8 (11 Oct 2024 11:55), Max: 37.2 (10 Oct 2024 23:16)  T(F): 98.2 (11 Oct 2024 11:55), Max: 98.9 (10 Oct 2024 23:16)  HR: 68 (11 Oct 2024 11:55) (68 - 70)  BP: 146/91 (11 Oct 2024 11:55) (146/91 - 158/93)  BP(mean): --  RR: 15 (11 Oct 2024 11:55) (15 - 18)  SpO2: 97% (11 Oct 2024 11:55) (96% - 98%)    Parameters below as of 11 Oct 2024 11:55  Patient On (Oxygen Delivery Method): room air        Physical Exam:  Constitutional: non-toxic, no distress  HEAD/EYES: anicteric, no conjunctival injection  ENT:  supple, no thrush  Cardiovascular:   normal S1, S2, no murmur, no edema  Respiratory:  clear BS bilaterally, no wheezes, no rales  GI:  soft, non-tender, normal bowel sounds  :  no dewitt, no CVA tenderness, scrotal area is dressed - dressing was changed yesterday by surgery   Musculoskeletal:  no synovitis, normal ROM  Neurologic: awake and alert, normal strength, no focal findings  Skin:  no rash, no erythema, no phlebitis  Heme/Onc: no lymphadenopathy   Psychiatric:  awake, alert, appropriate mood    WBC Count: 9.50 K/uL (10-10 @ 07:45)  WBC Count: 10.65 K/uL (10-09 @ 03:00)  WBC Count: 14.48 K/uL (10-08 @ 03:00)  WBC Count: 13.64 K/uL (10-07 @ 17:47)  WBC Count: 16.36 K/uL (10-07 @ 08:07)  WBC Count: 18.92 K/uL (10-06 @ 14:28)  WBC Count: 17.47 K/uL (10-04 @ 18:06)                            13.4   9.50  )-----------( 235      ( 10 Oct 2024 07:45 )             40.5       10-10    133[L]  |  101  |  17  ----------------------------<  333[H]  3.6   |  26  |  0.84    Ca    8.6      10 Oct 2024 07:45  Phos  3.0     10-10  Mg     1.9     10-10    TPro  7.2  /  Alb  2.3[L]  /  TBili  0.4  /  DBili  x   /  AST  54[H]  /  ALT  66  /  AlkPhos  75  10-10      Urinalysis Basic - ( 10 Oct 2024 07:45 )    Color: x / Appearance: x / SG: x / pH: x  Gluc: 333 mg/dL / Ketone: x  / Bili: x / Urobili: x   Blood: x / Protein: x / Nitrite: x   Leuk Esterase: x / RBC: x / WBC x   Sq Epi: x / Non Sq Epi: x / Bacteria: x        Creatinine Trend: 0.84<--, 0.80<--, 1.00<--, 1.17<--, 1.05<--, 1.12<--      MICROBIOLOGY:  v  Clean Catch Clean Catch (Midstream)  10-10-24   No growth  --  --      .Surgical Swab  10-07-24   No growth to date.  --  --      Clean Catch Clean Catch (Midstream)  10-06-24   <10,000 CFU/mL Normal Urogenital Henny  --  --      .Blood BLOOD  10-06-24   No growth at 4 days  --  --      .Blood BLOOD  10-06-24   No growth at 4 days  --  --      Clean Catch Clean Catch (Midstream)  10-04-24   <10,000 CFU/mL Normal Urogenital Henny  --  --        HIV-1 RNA Quantitative, Viral Load Log: NOT DET. lg /mL (10-09-24 @ 03:00)          C-Reactive Protein: 231 (10-09)                RADIOLOGY:

## 2024-10-11 NOTE — PROGRESS NOTE ADULT - SUBJECTIVE AND OBJECTIVE BOX
Patient seen and examined bedside resting comfortably.  No complaints offered.   Voiding without difficulty.  Denies hematuria and dysuria.  Denies nausea vomitin, diarrhea, fevers, chills, abd pain.  Tolerating diet.      T(F): 98.2 (10-11-24 @ 11:55), Max: 98.9 (10-10-24 @ 23:16)  HR: 68 (10-11-24 @ 11:55) (68 - 70)  BP: 146/91 (10-11-24 @ 11:55) (146/91 - 158/93)  RR: 15 (10-11-24 @ 11:55) (15 - 18)  SpO2: 97% (10-11-24 @ 11:55) (96% - 98%)  Wt(kg): --  CAPILLARY BLOOD GLUCOSE      POCT Blood Glucose.: 219 mg/dL (11 Oct 2024 12:55)  POCT Blood Glucose.: 276 mg/dL (11 Oct 2024 07:54)  POCT Blood Glucose.: 287 mg/dL (10 Oct 2024 21:08)      PHYSICAL EXAM:  General: NAD, alert and awake  HEENT: NCAT, EOMI, conjunctiva clear  Chest: nonlabored respirations, good inspiratory effort  Abdomen: soft, NTND.   Extremities: no pedal edema or calf tenderness noted   : : uncircumcised phallus, adequate meatus   scrotal wound dressing taken down, minimal slough. cleansed with saline. packed with 2inch iodoform covered with guaze and abd, and placed clean underwear         LABS:                        13.4   9.50  )-----------( 235      ( 10 Oct 2024 07:45 )             40.5   10-10    133[L]  |  101  |  17  ----------------------------<  333[H]  3.6   |  26  |  0.84    Ca    8.6      10 Oct 2024 07:45  Phos  3.0     10-10  Mg     1.9     10-10    TPro  7.2  /  Alb  2.3[L]  /  TBili  0.4  /  DBili  x   /  AST  54[H]  /  ALT  66  /  AlkPhos  75  10-10    I&O's Detail    10 Oct 2024 07:01  -  11 Oct 2024 07:00  --------------------------------------------------------  IN:    IV PiggyBack: 200 mL    IV PiggyBack: 300 mL    Oral Fluid: 390 mL  Total IN: 890 mL    OUT:    Indwelling Catheter - Urethral (mL): 1000 mL    Voided (mL): 650 mL  Total OUT: 1650 mL    Total NET: -760 mL        A/P; 24M with Paul's gangrene now POD#4 S/P scrotal exploration, excision/debridement of paul's gangrene  newly diagnosed DM, A1C 13  -c/w antibiotics per ID. recommend c/w IV antibiotics over the weekend.   -c/w daily dressing changes per surgical team. will continue to monitor wound. may need further debridement next week   -c/w mulitmodal analgesics  -c/w diabetic management per primary team. appreciate endocrine's note.  -case discussed with Dr Martin

## 2024-10-12 LAB
GLUCOSE BLDC GLUCOMTR-MCNC: 167 MG/DL — HIGH (ref 70–99)
GLUCOSE BLDC GLUCOMTR-MCNC: 201 MG/DL — HIGH (ref 70–99)
GLUCOSE BLDC GLUCOMTR-MCNC: 297 MG/DL — HIGH (ref 70–99)
GLUCOSE BLDC GLUCOMTR-MCNC: 316 MG/DL — HIGH (ref 70–99)

## 2024-10-12 PROCEDURE — 99232 SBSQ HOSP IP/OBS MODERATE 35: CPT

## 2024-10-12 RX ORDER — INSULIN LISPRO 100/ML
8 VIAL (ML) SUBCUTANEOUS
Refills: 0 | Status: DISCONTINUED | OUTPATIENT
Start: 2024-10-12 | End: 2024-10-14

## 2024-10-12 RX ORDER — INSULIN GLARGINE,HUM.REC.ANLOG 100/ML
20 VIAL (ML) SUBCUTANEOUS EVERY MORNING
Refills: 0 | Status: DISCONTINUED | OUTPATIENT
Start: 2024-10-12 | End: 2024-10-14

## 2024-10-12 RX ORDER — AMLODIPINE BESYLATE 10 MG
5 TABLET ORAL DAILY
Refills: 0 | Status: DISCONTINUED | OUTPATIENT
Start: 2024-10-12 | End: 2024-10-22

## 2024-10-12 RX ADMIN — Medication 40 MILLIGRAM(S): at 05:26

## 2024-10-12 RX ADMIN — CHLORHEXIDINE GLUCONATE 1 APPLICATION(S): 40 SOLUTION TOPICAL at 08:58

## 2024-10-12 RX ADMIN — Medication 5 MILLIGRAM(S): at 17:25

## 2024-10-12 RX ADMIN — PANTOPRAZOLE SODIUM 40 MILLIGRAM(S): 40 TABLET, DELAYED RELEASE ORAL at 12:18

## 2024-10-12 RX ADMIN — Medication 4: at 12:18

## 2024-10-12 RX ADMIN — Medication 5 UNIT(S): at 08:57

## 2024-10-12 RX ADMIN — PIPERACILLIN AND TAZOBACTAM 25 GRAM(S): .5; 4 INJECTION, POWDER, LYOPHILIZED, FOR SOLUTION INTRAVENOUS at 05:26

## 2024-10-12 RX ADMIN — Medication 5 UNIT(S): at 12:19

## 2024-10-12 RX ADMIN — Medication 5 UNIT(S): at 17:25

## 2024-10-12 RX ADMIN — Medication 15 UNIT(S): at 08:59

## 2024-10-12 RX ADMIN — Medication 3: at 08:56

## 2024-10-12 RX ADMIN — PIPERACILLIN AND TAZOBACTAM 25 GRAM(S): .5; 4 INJECTION, POWDER, LYOPHILIZED, FOR SOLUTION INTRAVENOUS at 22:22

## 2024-10-12 RX ADMIN — Medication 300 MILLIGRAM(S): at 05:26

## 2024-10-12 RX ADMIN — METFORMIN HYDROCHLORIDE 500 MILLIGRAM(S): 500 TABLET, EXTENDED RELEASE ORAL at 12:18

## 2024-10-12 RX ADMIN — Medication 2: at 17:24

## 2024-10-12 RX ADMIN — PIPERACILLIN AND TAZOBACTAM 25 GRAM(S): .5; 4 INJECTION, POWDER, LYOPHILIZED, FOR SOLUTION INTRAVENOUS at 14:16

## 2024-10-12 RX ADMIN — Medication 40 MILLIGRAM(S): at 17:26

## 2024-10-12 RX ADMIN — Medication 300 MILLIGRAM(S): at 17:26

## 2024-10-12 NOTE — PROGRESS NOTE ADULT - ASSESSMENT
24 year old male with no significant PMHx who presented to the ED on 10/6/24 for complaints of left testicular pain. Pt admitted to medicine service for sepsis likely from a scrotal abscess. Urology following and decision was made to take pt to OR for I&D scrotal abscess. 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 Minimal EBL, given 1L IVF. No pressors administered. Pt extubated post-op with no complications. Transferred to ICU for hemodynamic monitoring.       Sepsis likely in the setting of paul's gangrene from scrotal abscess   - On Zosyn and Zyvox  - ID following   - urology following, will appreciate recs, wound healing with Hyperbaric oxygen, daily wound dressing per sx team  - dewitt removed, voiding     ETHAN not on CPAP,  will use NIV if needed   -satting adequately on supplemental oxygen, maintain sats>92%       DM2 with hyperglycemia:  - Newly diagnosed  - A1c 13  - maintaining goal glucose ~ 180 given increased sensitivity during Hyperbaric oxygen and avoiding hypoglycemia.    - Increase Lantus to 20 U, add pre meal insulin 8 U     24 year old male with no significant PMHx who presented to the ED on 10/6/24 for complaints of left testicular pain. Pt admitted to medicine service for sepsis likely from a scrotal abscess. Urology following and decision was made to take pt to OR for I&D scrotal abscess. 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 Minimal EBL, given 1L IVF. No pressors administered. Pt extubated post-op with no complications. Transferred to ICU for hemodynamic monitoring.       Sepsis likely in the setting of paul's gangrene from scrotal abscess   - On Zosyn and Zyvox  - ID following   - urology following, will appreciate recs, wound healing with Hyperbaric oxygen, daily wound dressing per sx team  - dewitt removed, voiding     ETHAN not on CPAP,  will use NIV if needed   -satting adequately on supplemental oxygen, maintain sats>92%       DM2 with hyperglycemia:  - Newly diagnosed  - A1c 13  - maintaining goal glucose ~ 180 given increased sensitivity during Hyperbaric oxygen and avoiding hypoglycemia.    - Increase Lantus to 20 U, add pre meal insulin 8 U    HTN:  - BP elevated, added Amlodipine 5 mg QD.

## 2024-10-12 NOTE — PROGRESS NOTE ADULT - SUBJECTIVE AND OBJECTIVE BOX
Patient is a 24y old  Male who presents with a chief complaint of Sepsis secondary to scrotal cellulitis vs. epididymitis (12 Oct 2024 13:54)      INTERVAL HPI/OVERNIGHT EVENTS:  Pt was seen and examined, no acute events.      MEDICATIONS  (STANDING):  amLODIPine   Tablet 5 milliGRAM(s) Oral daily  chlorhexidine 2% Cloths 1 Application(s) Topical <User Schedule>  enoxaparin Injectable 40 milliGRAM(s) SubCutaneous every 12 hours  HYDROmorphone  Injectable 0.5 milliGRAM(s) IV Push once  influenza   Vaccine 0.5 milliLiter(s) IntraMuscular once  insulin glargine Injectable (LANTUS) 20 Unit(s) SubCutaneous every morning  insulin lispro (ADMELOG) corrective regimen sliding scale   SubCutaneous three times a day before meals  insulin lispro (ADMELOG) corrective regimen sliding scale   SubCutaneous at bedtime  insulin lispro Injectable (ADMELOG) 8 Unit(s) SubCutaneous three times a day before meals  linezolid  IVPB 600 milliGRAM(s) IV Intermittent every 12 hours  linezolid  IVPB      metFORMIN 500 milliGRAM(s) Oral daily  pantoprazole  Injectable 40 milliGRAM(s) IV Push daily  piperacillin/tazobactam IVPB.. 3.375 Gram(s) IV Intermittent every 8 hours    MEDICATIONS  (PRN):  acetaminophen     Tablet .. 975 milliGRAM(s) Oral every 6 hours PRN Moderate Pain (4 - 6)  oxyCODONE    IR 5 milliGRAM(s) Oral every 6 hours PRN Severe Pain (7 - 10)      Allergies  No Known Allergies        Vital Signs Last 24 Hrs  T(C): 36.8 (12 Oct 2024 16:51), Max: 37 (11 Oct 2024 23:25)  T(F): 98.3 (12 Oct 2024 16:51), Max: 98.6 (11 Oct 2024 23:25)  HR: 74 (12 Oct 2024 16:51) (62 - 74)  BP: 158/99 (12 Oct 2024 16:51) (153/91 - 162/99)  BP(mean): --  RR: 18 (12 Oct 2024 16:51) (17 - 18)  SpO2: 98% (12 Oct 2024 16:51) (96% - 100%)    Parameters below as of 12 Oct 2024 16:51  Patient On (Oxygen Delivery Method): room air        PHYSICAL EXAM:  GENERAL: NAD, lying in bed comfortably  ENT: B/L tonsillar enlargement  HEART: Regular rate and rhythm  LUNGS: Unlabored respirations.  Clear to auscultation bilaterally, no crackles, wheezing, or rhonchi  ABDOMEN: Soft, nontender, nondistended  EXTREMITIES: No clubbing, cyanosis, or edema  scrotal dressing in place  NERVOUS SYSTEM:  A&Ox3      LABS:      CAPILLARY BLOOD GLUCOSE      POCT Blood Glucose.: 201 mg/dL (12 Oct 2024 16:30)  POCT Blood Glucose.: 316 mg/dL (12 Oct 2024 11:27)  POCT Blood Glucose.: 297 mg/dL (12 Oct 2024 08:24)  POCT Blood Glucose.: 267 mg/dL (11 Oct 2024 21:19)      Culture - Urine (collected 10 Oct 2024 12:50)  Source: Clean Catch Clean Catch (Midstream)  Final Report (11 Oct 2024 15:41):    No growth    Culture - Wound Aerobic/Anaerobic (collected 07 Oct 2024 19:31)  Source: .Surgical Swab  Preliminary Report (09 Oct 2024 08:10):    No growth to date.      RADIOLOGY & ADDITIONAL TESTS:    Imaging Personally Reviewed:  [ ] YES  [ ] NO    Consultant(s) Notes Reviewed:  [ ] YES  [ ] NO    Care Discussed with Consultants/Other Providers [ ] YES  [ ] NO

## 2024-10-12 NOTE — PROGRESS NOTE ADULT - SUBJECTIVE AND OBJECTIVE BOX
Postoperative day: #5  Patient seen and examined bedside resting comfortably.  No complaints offered.   Voiding without difficulty.  Denies hematuria and dysuria.  Denies nausea and vomiting. Tolerating diet.  Denies chest pain, dyspnea, cough.    T(F): 98 (10-12-24 @ 11:12), Max: 98.6 (10-11-24 @ 23:25)  HR: 72 (10-12-24 @ 11:12) (62 - 88)  BP: 162/99 (10-12-24 @ 11:12) (153/91 - 169/98)  RR: 17 (10-12-24 @ 11:12) (17 - 18)  SpO2: 100% (10-12-24 @ 11:12) (96% - 100%)  Wt(kg): --  CAPILLARY BLOOD GLUCOSE      POCT Blood Glucose.: 316 mg/dL (12 Oct 2024 11:27)  POCT Blood Glucose.: 297 mg/dL (12 Oct 2024 08:24)  POCT Blood Glucose.: 267 mg/dL (11 Oct 2024 21:19)  POCT Blood Glucose.: 286 mg/dL (11 Oct 2024 16:25)      PHYSICAL EXAM:  General: NAD, alert and awake  HEENT: NCAT, EOMI, conjunctiva clear  Chest: nonlabored respirations, good inspiratory effort  Abdomen: soft, NTND.   Extremities: no pedal edema or calf tenderness noted   : uncircumcised phallus, adequate meatus. Scrotal dressing taken down, wound cleansed with normal saline, redressed with 2in iodoform packing, gauze, abd pad. Minimal slough noted, no purulent discharge or erythema.     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 Martin:  - Continue antibiotics per ID   - Continue daily dressing changes per surgical team. will continue to monitor wound. may need further debridement next week   - Continue multimodal analgesia   - Continue management of diabetes per medicine and endocrine  - Continue care per primary team

## 2024-10-13 LAB
ALBUMIN SERPL ELPH-MCNC: 2.5 G/DL — LOW (ref 3.3–5)
ALP SERPL-CCNC: 61 U/L — SIGNIFICANT CHANGE UP (ref 40–120)
ALT FLD-CCNC: 68 U/L — SIGNIFICANT CHANGE UP (ref 12–78)
ANION GAP SERPL CALC-SCNC: 7 MMOL/L — SIGNIFICANT CHANGE UP (ref 5–17)
AST SERPL-CCNC: 40 U/L — HIGH (ref 15–37)
BILIRUB SERPL-MCNC: 0.5 MG/DL — SIGNIFICANT CHANGE UP (ref 0.2–1.2)
BUN SERPL-MCNC: 11 MG/DL — SIGNIFICANT CHANGE UP (ref 7–23)
CALCIUM SERPL-MCNC: 9.3 MG/DL — SIGNIFICANT CHANGE UP (ref 8.5–10.1)
CHLORIDE SERPL-SCNC: 104 MMOL/L — SIGNIFICANT CHANGE UP (ref 96–108)
CO2 SERPL-SCNC: 26 MMOL/L — SIGNIFICANT CHANGE UP (ref 22–31)
CREAT SERPL-MCNC: 0.92 MG/DL — SIGNIFICANT CHANGE UP (ref 0.5–1.3)
CULTURE RESULTS: ABNORMAL
EGFR: 118 ML/MIN/1.73M2 — SIGNIFICANT CHANGE UP
GLUCOSE BLDC GLUCOMTR-MCNC: 208 MG/DL — HIGH (ref 70–99)
GLUCOSE BLDC GLUCOMTR-MCNC: 238 MG/DL — HIGH (ref 70–99)
GLUCOSE BLDC GLUCOMTR-MCNC: 245 MG/DL — HIGH (ref 70–99)
GLUCOSE BLDC GLUCOMTR-MCNC: 264 MG/DL — HIGH (ref 70–99)
GLUCOSE BLDC GLUCOMTR-MCNC: 272 MG/DL — HIGH (ref 70–99)
GLUCOSE BLDC GLUCOMTR-MCNC: 298 MG/DL — HIGH (ref 70–99)
GLUCOSE SERPL-MCNC: 178 MG/DL — HIGH (ref 70–99)
HCT VFR BLD CALC: 40.2 % — SIGNIFICANT CHANGE UP (ref 39–50)
HGB BLD-MCNC: 13.3 G/DL — SIGNIFICANT CHANGE UP (ref 13–17)
MCHC RBC-ENTMCNC: 28.4 PG — SIGNIFICANT CHANGE UP (ref 27–34)
MCHC RBC-ENTMCNC: 33.1 G/DL — SIGNIFICANT CHANGE UP (ref 32–36)
MCV RBC AUTO: 85.7 FL — SIGNIFICANT CHANGE UP (ref 80–100)
NRBC # BLD: 0 /100 WBCS — SIGNIFICANT CHANGE UP (ref 0–0)
PLATELET # BLD AUTO: 300 K/UL — SIGNIFICANT CHANGE UP (ref 150–400)
POTASSIUM SERPL-MCNC: 3.8 MMOL/L — SIGNIFICANT CHANGE UP (ref 3.5–5.3)
POTASSIUM SERPL-SCNC: 3.8 MMOL/L — SIGNIFICANT CHANGE UP (ref 3.5–5.3)
PROT SERPL-MCNC: 7.3 GM/DL — SIGNIFICANT CHANGE UP (ref 6–8.3)
RBC # BLD: 4.69 M/UL — SIGNIFICANT CHANGE UP (ref 4.2–5.8)
RBC # FLD: 12.7 % — SIGNIFICANT CHANGE UP (ref 10.3–14.5)
SODIUM SERPL-SCNC: 137 MMOL/L — SIGNIFICANT CHANGE UP (ref 135–145)
SPECIMEN SOURCE: SIGNIFICANT CHANGE UP
WBC # BLD: 10.83 K/UL — HIGH (ref 3.8–10.5)
WBC # FLD AUTO: 10.83 K/UL — HIGH (ref 3.8–10.5)

## 2024-10-13 PROCEDURE — 99232 SBSQ HOSP IP/OBS MODERATE 35: CPT

## 2024-10-13 RX ADMIN — Medication 40 MILLIGRAM(S): at 05:44

## 2024-10-13 RX ADMIN — Medication 8 UNIT(S): at 17:20

## 2024-10-13 RX ADMIN — METFORMIN HYDROCHLORIDE 500 MILLIGRAM(S): 500 TABLET, EXTENDED RELEASE ORAL at 12:34

## 2024-10-13 RX ADMIN — CHLORHEXIDINE GLUCONATE 1 APPLICATION(S): 40 SOLUTION TOPICAL at 05:44

## 2024-10-13 RX ADMIN — Medication 20 UNIT(S): at 08:40

## 2024-10-13 RX ADMIN — Medication 300 MILLIGRAM(S): at 17:20

## 2024-10-13 RX ADMIN — PIPERACILLIN AND TAZOBACTAM 25 GRAM(S): .5; 4 INJECTION, POWDER, LYOPHILIZED, FOR SOLUTION INTRAVENOUS at 22:20

## 2024-10-13 RX ADMIN — Medication 300 MILLIGRAM(S): at 05:44

## 2024-10-13 RX ADMIN — PANTOPRAZOLE SODIUM 40 MILLIGRAM(S): 40 TABLET, DELAYED RELEASE ORAL at 12:34

## 2024-10-13 RX ADMIN — PIPERACILLIN AND TAZOBACTAM 25 GRAM(S): .5; 4 INJECTION, POWDER, LYOPHILIZED, FOR SOLUTION INTRAVENOUS at 05:44

## 2024-10-13 RX ADMIN — Medication 8 UNIT(S): at 12:28

## 2024-10-13 RX ADMIN — Medication 40 MILLIGRAM(S): at 17:21

## 2024-10-13 RX ADMIN — PIPERACILLIN AND TAZOBACTAM 25 GRAM(S): .5; 4 INJECTION, POWDER, LYOPHILIZED, FOR SOLUTION INTRAVENOUS at 14:52

## 2024-10-13 RX ADMIN — Medication 1: at 22:20

## 2024-10-13 RX ADMIN — Medication 3: at 13:13

## 2024-10-13 RX ADMIN — Medication 3: at 17:20

## 2024-10-13 RX ADMIN — Medication 2: at 08:39

## 2024-10-13 RX ADMIN — Medication 8 UNIT(S): at 08:40

## 2024-10-13 RX ADMIN — Medication 5 MILLIGRAM(S): at 05:45

## 2024-10-13 NOTE — PROGRESS NOTE ADULT - ASSESSMENT
24M with Paul's gangrene now POD#6 S/P scrotal exploration, excision/debridement of paul's gangrene    Continue antibiotics per ID   daily dressing changes by surgical team.  pain control PRN  care per primary team

## 2024-10-13 NOTE — PROGRESS NOTE ADULT - SUBJECTIVE AND OBJECTIVE BOX
Patient is a 24y old  Male who presents with a chief complaint of Sepsis secondary to scrotal cellulitis vs. epididymitis (13 Oct 2024 07:07)      INTERVAL HPI/OVERNIGHT EVENTS:  Pt was seen and examined, no acute events.      MEDICATIONS  (STANDING):  amLODIPine   Tablet 5 milliGRAM(s) Oral daily  chlorhexidine 2% Cloths 1 Application(s) Topical <User Schedule>  enoxaparin Injectable 40 milliGRAM(s) SubCutaneous every 12 hours  HYDROmorphone  Injectable 0.5 milliGRAM(s) IV Push once  influenza   Vaccine 0.5 milliLiter(s) IntraMuscular once  insulin glargine Injectable (LANTUS) 20 Unit(s) SubCutaneous every morning  insulin lispro (ADMELOG) corrective regimen sliding scale   SubCutaneous at bedtime  insulin lispro (ADMELOG) corrective regimen sliding scale   SubCutaneous three times a day before meals  insulin lispro Injectable (ADMELOG) 8 Unit(s) SubCutaneous three times a day before meals  linezolid  IVPB      linezolid  IVPB 600 milliGRAM(s) IV Intermittent every 12 hours  metFORMIN 500 milliGRAM(s) Oral daily  pantoprazole  Injectable 40 milliGRAM(s) IV Push daily  piperacillin/tazobactam IVPB.. 3.375 Gram(s) IV Intermittent every 8 hours    MEDICATIONS  (PRN):  acetaminophen     Tablet .. 975 milliGRAM(s) Oral every 6 hours PRN Moderate Pain (4 - 6)  oxyCODONE    IR 5 milliGRAM(s) Oral every 6 hours PRN Severe Pain (7 - 10)      Allergies  No Known Allergies        Vital Signs Last 24 Hrs  T(C): 36.8 (13 Oct 2024 17:09), Max: 36.9 (13 Oct 2024 11:10)  T(F): 98.3 (13 Oct 2024 17:09), Max: 98.5 (13 Oct 2024 11:10)  HR: 78 (13 Oct 2024 17:09) (73 - 89)  BP: 148/83 (13 Oct 2024 17:09) (145/95 - 152/87)  BP(mean): --  RR: 17 (13 Oct 2024 11:10) (17 - 18)  SpO2: 97% (13 Oct 2024 11:10) (97% - 99%)    Parameters below as of 13 Oct 2024 11:10  Patient On (Oxygen Delivery Method): room air        PHYSICAL EXAM:  GENERAL: NAD, lying in bed comfortably  ENT: B/L tonsillar enlargement  HEART: Regular rate and rhythm  LUNGS: Unlabored respirations.  Clear to auscultation bilaterally, no crackles, wheezing, or rhonchi  ABDOMEN: Soft, nontender, nondistended  EXTREMITIES: No clubbing, cyanosis, or edema  scrotal dressing in place  NERVOUS SYSTEM:  A&Ox3        LABS:                        13.3   10.83 )-----------( 300      ( 13 Oct 2024 05:25 )             40.2     10-13    137  |  104  |  11  ----------------------------<  178[H]  3.8   |  26  |  0.92    Ca    9.3      13 Oct 2024 05:25    TPro  7.3  /  Alb  2.5[L]  /  TBili  0.5  /  DBili  x   /  AST  40[H]  /  ALT  68  /  AlkPhos  61  10-13      Urinalysis Basic - ( 13 Oct 2024 05:25 )    Color: x / Appearance: x / SG: x / pH: x  Gluc: 178 mg/dL / Ketone: x  / Bili: x / Urobili: x   Blood: x / Protein: x / Nitrite: x   Leuk Esterase: x / RBC: x / WBC x   Sq Epi: x / Non Sq Epi: x / Bacteria: x      CAPILLARY BLOOD GLUCOSE      POCT Blood Glucose.: 272 mg/dL (13 Oct 2024 17:17)  POCT Blood Glucose.: 238 mg/dL (13 Oct 2024 16:12)  POCT Blood Glucose.: 298 mg/dL (13 Oct 2024 13:11)  POCT Blood Glucose.: 245 mg/dL (13 Oct 2024 11:31)  POCT Blood Glucose.: 208 mg/dL (13 Oct 2024 07:55)  POCT Blood Glucose.: 167 mg/dL (12 Oct 2024 21:32)      Culture - Urine (collected 10 Oct 2024 12:50)  Source: Clean Catch Clean Catch (Midstream)  Final Report (11 Oct 2024 15:41):    No growth      RADIOLOGY & ADDITIONAL TESTS:    Imaging Personally Reviewed:  [ ] YES  [ ] NO    Consultant(s) Notes Reviewed:  [ ] YES  [ ] NO    Care Discussed with Consultants/Other Providers [ ] YES  [ ] NO

## 2024-10-13 NOTE — PROGRESS NOTE ADULT - SUBJECTIVE AND OBJECTIVE BOX
SURGERY PROGRESS HPI:  Pt seen and examined at bedside. Pt is POD 6 after excision and debridement of scrotal abscess. Pain is well controlled, pt denies complaints. Afebrile, no acute events overnight.     Vital Signs Last 24 Hrs  T(C): 36.3 (13 Oct 2024 05:29), Max: 36.8 (12 Oct 2024 16:51)  T(F): 97.4 (13 Oct 2024 05:29), Max: 98.3 (12 Oct 2024 16:51)  HR: 73 (13 Oct 2024 05:29) (72 - 76)  BP: 150/91 (13 Oct 2024 05:29) (145/95 - 162/99)  BP(mean): --  RR: 18 (13 Oct 2024 05:29) (17 - 18)  SpO2: 99% (13 Oct 2024 05:29) (98% - 100%)    Parameters below as of 12 Oct 2024 16:51  Patient On (Oxygen Delivery Method): room air          PHYSICAL EXAM:    GENERAL: NAD  HEAD:  Atraumatic, Normocephalic  CHEST/LUNG: Breathing normally   HEART: RRR  ABDOMEN: non distended, soft, non tender, no guarding  :  Scrotum dressing taken down, wound healing well, no sign of infection, xeroform and ABD pad applied to scrotum

## 2024-10-13 NOTE — PROGRESS NOTE ADULT - ASSESSMENT
24 year old male with no significant PMHx who presented to the ED on 10/6/24 for complaints of left testicular pain. Pt admitted to medicine service for sepsis likely from a scrotal abscess. Urology following and decision was made to take pt to OR for I&D scrotal abscess. 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 Minimal EBL, given 1L IVF. No pressors administered. Pt extubated post-op with no complications. Transferred to ICU for hemodynamic monitoring.       Sepsis likely in the setting of paul's gangrene from scrotal abscess   - On Zosyn and Zyvox  - ID following   - urology following, will appreciate recs, wound healing with Hyperbaric oxygen, daily wound dressing per sx team  - dewitt removed, voiding     ETHAN not on CPAP,  will use NIV if needed   -satting adequately on supplemental oxygen, maintain sats>92%       DM2 with hyperglycemia:  - Newly diagnosed  - A1c 13  - maintaining goal glucose ~ 180 given increased sensitivity during Hyperbaric oxygen and avoiding hypoglycemia.    - Increase Lantus to 20 U, add pre meal insulin 8 U    HTN:  - BP elevated, added Amlodipine 5 mg QD.

## 2024-10-13 NOTE — PROGRESS NOTE ADULT - SUBJECTIVE AND OBJECTIVE BOX
Patient is a 24y old  Male who presents with a chief complaint of Sepsis secondary to scrotal cellulitis vs. epididymitis (13 Oct 2024 19:28)      Interval History: Fingersticks are still in the 200s  On 20 units of Lantus and 8 units of prandial lispro and 500 mg of metformin daily was added yesterday    MEDICATIONS  (STANDING):  amLODIPine   Tablet 5 milliGRAM(s) Oral daily  chlorhexidine 2% Cloths 1 Application(s) Topical <User Schedule>  enoxaparin Injectable 40 milliGRAM(s) SubCutaneous every 12 hours  HYDROmorphone  Injectable 0.5 milliGRAM(s) IV Push once  influenza   Vaccine 0.5 milliLiter(s) IntraMuscular once  insulin glargine Injectable (LANTUS) 20 Unit(s) SubCutaneous every morning  insulin lispro (ADMELOG) corrective regimen sliding scale   SubCutaneous three times a day before meals  insulin lispro (ADMELOG) corrective regimen sliding scale   SubCutaneous at bedtime  insulin lispro Injectable (ADMELOG) 8 Unit(s) SubCutaneous three times a day before meals  linezolid  IVPB      linezolid  IVPB 600 milliGRAM(s) IV Intermittent every 12 hours  metFORMIN 500 milliGRAM(s) Oral daily  pantoprazole  Injectable 40 milliGRAM(s) IV Push daily  piperacillin/tazobactam IVPB.. 3.375 Gram(s) IV Intermittent every 8 hours    MEDICATIONS  (PRN):  acetaminophen     Tablet .. 975 milliGRAM(s) Oral every 6 hours PRN Moderate Pain (4 - 6)  oxyCODONE    IR 5 milliGRAM(s) Oral every 6 hours PRN Severe Pain (7 - 10)      Allergies    No Known Allergies    Intolerances        REVIEW OF SYSTEMS:  CONSTITUTIONAL: no changes  EYES: No eye pain, visual disturbances, or discharge  ENMT:  No difficulty hearing, No sinus or throat pain  NECK: No pain or stiffness  RESPIRATORY: No cough, wheezing, chills or hemoptysis; No shortness of breath  CARDIOVASCULAR: No chest pain, palpitations or leg swelling  GASTROINTESTINAL: No abdominal or epigastric pain. No nausea, vomiting, or hematemesis; No diarrhea or constipation. No melena or hematochezia.  GENITOURINARY: No dysuria, frequency, hematuria, or incontinence  NEUROLOGICAL: No headaches, memory loss, loss of strength, numbness, or tremors  SKIN: No itching, burning, rashes, or lesions   ENDOCRINE: No heat or cold intolerance; No hair loss  MUSCULOSKELETAL: No joint pain or swelling; No muscle, back, or extremity pain  PSYCHIATRIC: No depression, anxiety, mood swings, or difficulty sleeping  HEME/LYMPH: No easy bruising, or bleeding gums  ALLERY AND IMMUNOLOGIC: No hives or eczema    Vital Signs Last 24 Hrs  T(C): 36.8 (13 Oct 2024 17:09), Max: 36.9 (13 Oct 2024 11:10)  T(F): 98.3 (13 Oct 2024 17:09), Max: 98.5 (13 Oct 2024 11:10)  HR: 78 (13 Oct 2024 17:09) (73 - 89)  BP: 148/83 (13 Oct 2024 17:09) (145/95 - 152/87)  BP(mean): --  RR: 17 (13 Oct 2024 11:10) (17 - 18)  SpO2: 97% (13 Oct 2024 11:10) (97% - 99%)    Parameters below as of 13 Oct 2024 11:10  Patient On (Oxygen Delivery Method): room air        PHYSICAL EXAM:  GENERAL:   HEAD: Atraumatic, Normocephalic  EYES: PERRLA, conjunctiva and sclera clear  ENMT: No  exudates,; Moist mucous membranes,, No lesions  NECK: Supple, No JVD, Normal thyroid  NERVOUS SYSTEM:  Alert & Oriented,   CHEST/LUNG: Clear to auscultation bilaterally; No rales, rhonchi, wheezing, or rubs  HEART: Regular rate and rhythm; No murmurs, rubs, or gallops  ABDOMEN: Soft, Nontender, Nondistended; Bowel sounds present  EXTREMITIES:  2+ Peripheral Pulses, no edema  SKIN: No rashes or lesions    LABS:      Urinalysis Basic - ( 13 Oct 2024 05:25 )    Color: x / Appearance: x / SG: x / pH: x  Gluc: 178 mg/dL / Ketone: x  / Bili: x / Urobili: x   Blood: x / Protein: x / Nitrite: x   Leuk Esterase: x / RBC: x / WBC x   Sq Epi: x / Non Sq Epi: x / Bacteria: x      CAPILLARY BLOOD GLUCOSE      POCT Blood Glucose.: 264 mg/dL (13 Oct 2024 21:23)  POCT Blood Glucose.: 272 mg/dL (13 Oct 2024 17:17)  POCT Blood Glucose.: 238 mg/dL (13 Oct 2024 16:12)  POCT Blood Glucose.: 298 mg/dL (13 Oct 2024 13:11)  POCT Blood Glucose.: 245 mg/dL (13 Oct 2024 11:31)  POCT Blood Glucose.: 208 mg/dL (13 Oct 2024 07:55)    Lipid panel:           Thyroid:  Diabetes Tests:  Parathyroid Panel:  Adrenals:  RADIOLOGY & ADDITIONAL TESTS:    Imaging Personally Reviewed:  [ ] YES  [ ] NO    Consultant(s) Notes Reviewed:  [ ] YES  [ ] NO    Care Discussed with Consultants/Other Providers [ ] YES  [ ] NO

## 2024-10-14 LAB
ALBUMIN SERPL ELPH-MCNC: 2.7 G/DL — LOW (ref 3.3–5)
ALP SERPL-CCNC: 68 U/L — SIGNIFICANT CHANGE UP (ref 40–120)
ALT FLD-CCNC: 60 U/L — SIGNIFICANT CHANGE UP (ref 12–78)
ANION GAP SERPL CALC-SCNC: 7 MMOL/L — SIGNIFICANT CHANGE UP (ref 5–17)
AST SERPL-CCNC: 27 U/L — SIGNIFICANT CHANGE UP (ref 15–37)
BILIRUB SERPL-MCNC: 0.4 MG/DL — SIGNIFICANT CHANGE UP (ref 0.2–1.2)
BUN SERPL-MCNC: 10 MG/DL — SIGNIFICANT CHANGE UP (ref 7–23)
CALCIUM SERPL-MCNC: 9.3 MG/DL — SIGNIFICANT CHANGE UP (ref 8.5–10.1)
CHLORIDE SERPL-SCNC: 106 MMOL/L — SIGNIFICANT CHANGE UP (ref 96–108)
CO2 SERPL-SCNC: 23 MMOL/L — SIGNIFICANT CHANGE UP (ref 22–31)
CREAT SERPL-MCNC: 0.99 MG/DL — SIGNIFICANT CHANGE UP (ref 0.5–1.3)
EGFR: 108 ML/MIN/1.73M2 — SIGNIFICANT CHANGE UP
GLUCOSE BLDC GLUCOMTR-MCNC: 162 MG/DL — HIGH (ref 70–99)
GLUCOSE BLDC GLUCOMTR-MCNC: 196 MG/DL — HIGH (ref 70–99)
GLUCOSE BLDC GLUCOMTR-MCNC: 211 MG/DL — HIGH (ref 70–99)
GLUCOSE BLDC GLUCOMTR-MCNC: 231 MG/DL — HIGH (ref 70–99)
GLUCOSE BLDC GLUCOMTR-MCNC: 301 MG/DL — HIGH (ref 70–99)
GLUCOSE SERPL-MCNC: 295 MG/DL — HIGH (ref 70–99)
HCT VFR BLD CALC: 41.7 % — SIGNIFICANT CHANGE UP (ref 39–50)
HGB BLD-MCNC: 14 G/DL — SIGNIFICANT CHANGE UP (ref 13–17)
MCHC RBC-ENTMCNC: 28.5 PG — SIGNIFICANT CHANGE UP (ref 27–34)
MCHC RBC-ENTMCNC: 33.6 G/DL — SIGNIFICANT CHANGE UP (ref 32–36)
MCV RBC AUTO: 84.8 FL — SIGNIFICANT CHANGE UP (ref 80–100)
NRBC # BLD: 0 /100 WBCS — SIGNIFICANT CHANGE UP (ref 0–0)
PLATELET # BLD AUTO: 331 K/UL — SIGNIFICANT CHANGE UP (ref 150–400)
POTASSIUM SERPL-MCNC: 3.8 MMOL/L — SIGNIFICANT CHANGE UP (ref 3.5–5.3)
POTASSIUM SERPL-SCNC: 3.8 MMOL/L — SIGNIFICANT CHANGE UP (ref 3.5–5.3)
PROT SERPL-MCNC: 7.5 GM/DL — SIGNIFICANT CHANGE UP (ref 6–8.3)
RBC # BLD: 4.92 M/UL — SIGNIFICANT CHANGE UP (ref 4.2–5.8)
RBC # FLD: 12.6 % — SIGNIFICANT CHANGE UP (ref 10.3–14.5)
SODIUM SERPL-SCNC: 136 MMOL/L — SIGNIFICANT CHANGE UP (ref 135–145)
WBC # BLD: 9.45 K/UL — SIGNIFICANT CHANGE UP (ref 3.8–10.5)
WBC # FLD AUTO: 9.45 K/UL — SIGNIFICANT CHANGE UP (ref 3.8–10.5)

## 2024-10-14 PROCEDURE — 99183 HYPERBARIC OXYGEN THERAPY: CPT | Mod: 1L

## 2024-10-14 PROCEDURE — 99232 SBSQ HOSP IP/OBS MODERATE 35: CPT

## 2024-10-14 RX ORDER — INSULIN LISPRO 100/ML
10 VIAL (ML) SUBCUTANEOUS
Refills: 0 | Status: DISCONTINUED | OUTPATIENT
Start: 2024-10-15 | End: 2024-10-22

## 2024-10-14 RX ORDER — HYDRALAZINE HYDROCHLORIDE 50 MG/1
10 TABLET, FILM COATED ORAL ONCE
Refills: 0 | Status: COMPLETED | OUTPATIENT
Start: 2024-10-14 | End: 2024-10-14

## 2024-10-14 RX ORDER — INSULIN LISPRO 100/ML
10 VIAL (ML) SUBCUTANEOUS
Refills: 0 | Status: DISCONTINUED | OUTPATIENT
Start: 2024-10-14 | End: 2024-10-22

## 2024-10-14 RX ORDER — INSULIN GLARGINE,HUM.REC.ANLOG 100/ML
25 VIAL (ML) SUBCUTANEOUS EVERY MORNING
Refills: 0 | Status: DISCONTINUED | OUTPATIENT
Start: 2024-10-15 | End: 2024-10-22

## 2024-10-14 RX ORDER — GLUCAGON INJECTION, SOLUTION 1 MG/.2ML
1 INJECTION, SOLUTION SUBCUTANEOUS ONCE
Refills: 0 | Status: DISCONTINUED | OUTPATIENT
Start: 2024-10-14 | End: 2024-10-22

## 2024-10-14 RX ADMIN — PIPERACILLIN AND TAZOBACTAM 25 GRAM(S): .5; 4 INJECTION, POWDER, LYOPHILIZED, FOR SOLUTION INTRAVENOUS at 06:06

## 2024-10-14 RX ADMIN — Medication 1: at 16:57

## 2024-10-14 RX ADMIN — Medication 40 MILLIGRAM(S): at 05:53

## 2024-10-14 RX ADMIN — OXYCODONE HYDROCHLORIDE 5 MILLIGRAM(S): 30 TABLET ORAL at 21:00

## 2024-10-14 RX ADMIN — PIPERACILLIN AND TAZOBACTAM 25 GRAM(S): .5; 4 INJECTION, POWDER, LYOPHILIZED, FOR SOLUTION INTRAVENOUS at 21:44

## 2024-10-14 RX ADMIN — Medication 5 MILLIGRAM(S): at 05:53

## 2024-10-14 RX ADMIN — Medication 20 UNIT(S): at 08:52

## 2024-10-14 RX ADMIN — Medication 40 MILLIGRAM(S): at 17:18

## 2024-10-14 RX ADMIN — Medication 4: at 08:51

## 2024-10-14 RX ADMIN — OXYCODONE HYDROCHLORIDE 5 MILLIGRAM(S): 30 TABLET ORAL at 20:00

## 2024-10-14 RX ADMIN — Medication 10 UNIT(S): at 16:58

## 2024-10-14 RX ADMIN — Medication 8 UNIT(S): at 08:52

## 2024-10-14 RX ADMIN — CHLORHEXIDINE GLUCONATE 1 APPLICATION(S): 40 SOLUTION TOPICAL at 06:34

## 2024-10-14 RX ADMIN — PIPERACILLIN AND TAZOBACTAM 25 GRAM(S): .5; 4 INJECTION, POWDER, LYOPHILIZED, FOR SOLUTION INTRAVENOUS at 13:17

## 2024-10-14 RX ADMIN — METFORMIN HYDROCHLORIDE 500 MILLIGRAM(S): 500 TABLET, EXTENDED RELEASE ORAL at 13:17

## 2024-10-14 RX ADMIN — HYDRALAZINE HYDROCHLORIDE 10 MILLIGRAM(S): 50 TABLET, FILM COATED ORAL at 20:00

## 2024-10-14 RX ADMIN — Medication 975 MILLIGRAM(S): at 17:18

## 2024-10-14 RX ADMIN — PANTOPRAZOLE SODIUM 40 MILLIGRAM(S): 40 TABLET, DELAYED RELEASE ORAL at 13:17

## 2024-10-14 RX ADMIN — Medication 975 MILLIGRAM(S): at 18:18

## 2024-10-14 RX ADMIN — Medication 300 MILLIGRAM(S): at 17:18

## 2024-10-14 RX ADMIN — Medication 300 MILLIGRAM(S): at 05:53

## 2024-10-14 NOTE — PROGRESS NOTE ADULT - ASSESSMENT
24M with Paul's gangrene now POD#1 S/P scrotal exploration, excision/debridement of paul's gangrene  see brief op report which showed:  purulent drainage upon entering scrotum, devitalized tissue excised, slough debrided from b/l scrotum  had fever leukocytosis, tachycardia     10/10: downgraded to the medical floor, no fevers since 10/8, RA, WBC normalized, Cr ok, BCs, UC and surgical culture NGTD, Vancomycin and clindamycin were stopped, Zosyn IV and Linezolid continued.   10/11: no fever, RA,  no new cbc, Cr ok surgical cx and BCs NGTD, the wound is being monitored by urology / surgery, possibly will need further debridement next week, pending hospital course. Linezolid and Zosyn IV continued.   10/14: doing well, no fevers, RA, no wbc, Cr ok, surgical cultures growing Prevotella, today is day # 7 post op, will stop abx after today's doses.      #Paul's gangrene   #Cellulitis   #Sepsis  #newly Diabetes mellitus type 2  #STD screening     Plan:  stop abx after today's doses   local wound care, daily dressing change per surgical team    DVT prophylaxis, Incentive Spirometer, OOB, pain control  trend wbc count   hyperbaric oxygen therapy per vascular / wound care team   HIV screen is negative   Gc/chlamydia - negative   syphilis screen negative   newly diagnosed DM2-needs good insulin regimen, endocrinology consult is appreciated   recognize he is insulin naive and could be very sensitive to insulin doses and need to be mindful of doses to avoid hypoglycemic episodes   urology follow up is appreciated    discussed with Dr. Briones  discussed with urology team   24M with Paul's gangrene now POD#1 S/P scrotal exploration, excision/debridement of paul's gangrene  see brief op report which showed:  purulent drainage upon entering scrotum, devitalized tissue excised, slough debrided from b/l scrotum  had fever leukocytosis, tachycardia     10/10: downgraded to the medical floor, no fevers since 10/8, RA, WBC normalized, Cr ok, BCs, UC and surgical culture NGTD, Vancomycin and clindamycin were stopped, Zosyn IV and Linezolid continued.   10/11: no fever, RA,  no new cbc, Cr ok surgical cx and BCs NGTD, the wound is being monitored by urology / surgery, possibly will need further debridement next week, pending hospital course. Linezolid and Zosyn IV continued.   10/14: doing well, no fevers, RA, no wbc, Cr ok, surgical cultures growing Prevotella, today is day # 7 post op, will stop abx after today's doses.      #Paul's gangrene   #Cellulitis   #Sepsis  #newly Diabetes mellitus type 2  #STD screening     Plan:  stop abx after today's doses   local wound care, daily dressing change per surgical team    DVT prophylaxis, Incentive Spirometer, OOB, pain control  trend wbc count   hyperbaric oxygen therapy per vascular / wound care team   HIV screen is negative   Gc/chlamydia - negative   syphilis screen negative   newly diagnosed DM2-needs good insulin regimen, endocrinology consult is appreciated   recognize he is insulin naive and could be very sensitive to insulin doses and need to be mindful of doses to avoid hypoglycemic episodes   urology follow up is appreciated    discussed with Dr. Briones  discussed with urology team   discussed with surgical team

## 2024-10-14 NOTE — PROGRESS NOTE ADULT - SUBJECTIVE AND OBJECTIVE BOX
CHA MCCAIN JR  [id number]  24y (2000)    Follow Up:  paul's gangrene     Interval History: The pt was seen and examined earlier, not in acute distress, no new complaints. Pt is afebrile, RA, no WBC.     PAST MEDICAL & SURGICAL HISTORY:  No pertinent past medical history          ROS:    [ ] Unobtainable because:  [x ] All other systems negative    Constitutional: no fever, no chills  Head: no trauma  Eyes: no vision changes, no eye pain  ENT:  no sore throat, no rhinorrhea  Cardiovascular:  no chest pain, no palpitation  Respiratory:  no SOB, no cough  GI:  no abd pain, no vomiting, no diarrhea  urinary: no dysuria, no hematuria, no flank pain  musculoskeletal:  no joint pain, no joint swelling  skin:  no rash  neurology:  no headache, no seizure, no change in mental status  psych: no anxiety, no depression         Allergies  No Known Allergies        ANTIMICROBIALS:  linezolid  IVPB    linezolid  IVPB 600 every 12 hours  piperacillin/tazobactam IVPB.. 3.375 every 8 hours      OTHER MEDS:  acetaminophen     Tablet .. 975 milliGRAM(s) Oral every 6 hours PRN  amLODIPine   Tablet 5 milliGRAM(s) Oral daily  chlorhexidine 2% Cloths 1 Application(s) Topical <User Schedule>  enoxaparin Injectable 40 milliGRAM(s) SubCutaneous every 12 hours  HYDROmorphone  Injectable 0.5 milliGRAM(s) IV Push once  influenza   Vaccine 0.5 milliLiter(s) IntraMuscular once  insulin glargine Injectable (LANTUS) 20 Unit(s) SubCutaneous every morning  insulin lispro (ADMELOG) corrective regimen sliding scale   SubCutaneous three times a day before meals  insulin lispro (ADMELOG) corrective regimen sliding scale   SubCutaneous at bedtime  insulin lispro Injectable (ADMELOG) 8 Unit(s) SubCutaneous three times a day before meals  metFORMIN 500 milliGRAM(s) Oral daily  oxyCODONE    IR 5 milliGRAM(s) Oral every 6 hours PRN  pantoprazole  Injectable 40 milliGRAM(s) IV Push daily      Vital Signs Last 24 Hrs  T(C): 36.7 (14 Oct 2024 11:48), Max: 36.8 (13 Oct 2024 17:09)  T(F): 98 (14 Oct 2024 11:48), Max: 98.3 (13 Oct 2024 17:09)  HR: 78 (14 Oct 2024 11:48) (78 - 79)  BP: 153/60 (14 Oct 2024 11:48) (148/83 - 153/60)  BP(mean): --  RR: 18 (14 Oct 2024 11:48) (18 - 18)  SpO2: 97% (14 Oct 2024 11:48) (96% - 97%)    Parameters below as of 14 Oct 2024 11:48  Patient On (Oxygen Delivery Method): room air        Physical Exam:  Constitutional: non-toxic, no distress  HEAD/EYES: anicteric, no conjunctival injection  ENT:  supple, no thrush  Cardiovascular:   normal S1, S2, no murmur, no edema  Respiratory:  clear BS bilaterally, no wheezes, no rales  GI:  soft, non-tender, normal bowel sounds  :  no dewitt, no CVA tenderness, scrotal area is dressed - dressing was changed today by surgery   Musculoskeletal:  no synovitis, normal ROM  Neurologic: awake and alert, normal strength, no focal findings  Skin:  no rash, no erythema, no phlebitis  Heme/Onc: no lymphadenopathy   Psychiatric:  awake, alert, appropriate mood    WBC Count: 9.45 K/uL (10-14 @ 08:00)  WBC Count: 10.83 K/uL (10-13 @ 05:25)  WBC Count: 9.50 K/uL (10-10 @ 07:45)  WBC Count: 10.65 K/uL (10-09 @ 03:00)  WBC Count: 14.48 K/uL (10-08 @ 03:00)  WBC Count: 13.64 K/uL (10-07 @ 17:47)                            14.0   9.45  )-----------( 331      ( 14 Oct 2024 08:00 )             41.7       10-14    136  |  106  |  10  ----------------------------<  295[H]  3.8   |  23  |  0.99    Ca    9.3      14 Oct 2024 08:00    TPro  7.5  /  Alb  2.7[L]  /  TBili  0.4  /  DBili  x   /  AST  27  /  ALT  60  /  AlkPhos  68  10-14      Urinalysis Basic - ( 14 Oct 2024 08:00 )    Color: x / Appearance: x / SG: x / pH: x  Gluc: 295 mg/dL / Ketone: x  / Bili: x / Urobili: x   Blood: x / Protein: x / Nitrite: x   Leuk Esterase: x / RBC: x / WBC x   Sq Epi: x / Non Sq Epi: x / Bacteria: x        Creatinine Trend: 0.99<--, 0.92<--, 0.84<--, 0.80<--, 1.00<--, 1.17<--      MICROBIOLOGY:  v  Clean Catch Clean Catch (Midstream)  10-10-24   No growth  --  --      .Surgical Swab  10-07-24   Moderate Prevotella disiens "Susceptibilities not performed"  Moderate Prevotella timonensis "Susceptibilities not performed"  --  --      Clean Catch Clean Catch (Midstream)  10-06-24   <10,000 CFU/mL Normal Urogenital Henny  --  --      .Blood BLOOD  10-06-24   No growth at 5 days  --  --      .Blood BLOOD  10-06-24   No growth at 5 days  --  --      Clean Catch Clean Catch (Midstream)  10-04-24   <10,000 CFU/mL Normal Urogenital Henny  --  --    HIV-1 RNA Quantitative, Viral Load Log: NOT DET. lg /mL (10-09-24 @ 03:00)    C-Reactive Protein: 231 (10-09)    RADIOLOGY:

## 2024-10-14 NOTE — PROGRESS NOTE ADULT - SUBJECTIVE AND OBJECTIVE BOX
Patient seen and examined bedside  C/o itching at debridement site  Ambulates only in his room  Afebrile.    T(F): 97.9 (10-14-24 @ 05:15), Max: 98.5 (10-13-24 @ 11:10)  HR: 79 (10-14-24 @ 05:15) (78 - 89)  BP: 152/95 (10-14-24 @ 05:15) (148/83 - 152/95)  RR: 18 (10-14-24 @ 05:15) (17 - 18)  SpO2: 96% (10-14-24 @ 05:15) (96% - 97%)    POCT Blood Glucose.: 301 mg/dL (14 Oct 2024 07:54)  POCT Blood Glucose.: 264 mg/dL (13 Oct 2024 21:23)  POCT Blood Glucose.: 272 mg/dL (13 Oct 2024 17:17)  POCT Blood Glucose.: 238 mg/dL (13 Oct 2024 16:12)  POCT Blood Glucose.: 298 mg/dL (13 Oct 2024 13:11)  POCT Blood Glucose.: 245 mg/dL (13 Oct 2024 11:31)      PHYSICAL EXAM:  General: NAD, alert and awake, well-appearing  HEENT: NCAT, EOMI, conjunctiva clear  Chest: nonlabored respirations, good inspiratory effort  Abdomen: soft, NTND.   Extremities: no pedal edema or calf tenderness noted   : uncircumcised phallus, adequate meatus. Scrotal skin absent anteriorly, post op debridement site. Mild adherent fibrinous exudate, no purulence or active drainage. No tunneling or undermining. Washed with saline and dry dressing applied.     LABS:                        14.0   9.45  )-----------( 331      ( 14 Oct 2024 08:00 )             41.7   10-14    136  |  106  |  10  ----------------------------<  295[H]  3.8   |  23  |  0.99    Ca    9.3      14 Oct 2024 08:00    TPro  7.5  /  Alb  2.7[L]  /  TBili  0.4  /  DBili  x   /  AST  27  /  ALT  60  /  AlkPhos  68  10-14    I&O's Detail    13 Oct 2024 07:01  -  14 Oct 2024 07:00  --------------------------------------------------------  IN:    Oral Fluid: 360 mL  Total IN: 360 mL    OUT:  Total OUT: 0 mL    Total NET: 360 mL    A/P: 24M POD#6 s/p scrotal exploration and excision and debridement of paul's gangrene  continue antibiotics as per ID  local wound care  continue hyperbaric therapy  patient encouraged to ambulate  continue medical management  discuss with Dr palmer

## 2024-10-14 NOTE — PROGRESS NOTE ADULT - SUBJECTIVE AND OBJECTIVE BOX
Patient is a 24y old  Male who presents with a chief complaint of Sepsis secondary to scrotal cellulitis vs. epididymitis (14 Oct 2024 15:08)      Interval History: Finger-sticks are in high 100's and low 200's   om Lantus 25 units and prandial lispro 10 units       MEDICATIONS  (STANDING):  amLODIPine   Tablet 5 milliGRAM(s) Oral daily  chlorhexidine 2% Cloths 1 Application(s) Topical <User Schedule>  dextrose 5%. 1000 milliLiter(s) (50 mL/Hr) IV Continuous <Continuous>  dextrose 5%. 1000 milliLiter(s) (100 mL/Hr) IV Continuous <Continuous>  dextrose 50% Injectable 25 Gram(s) IV Push once  dextrose 50% Injectable 25 Gram(s) IV Push once  dextrose 50% Injectable 12.5 Gram(s) IV Push once  enoxaparin Injectable 40 milliGRAM(s) SubCutaneous every 12 hours  glucagon  Injectable 1 milliGRAM(s) IntraMuscular once  HYDROmorphone  Injectable 0.5 milliGRAM(s) IV Push once  influenza   Vaccine 0.5 milliLiter(s) IntraMuscular once  insulin lispro (ADMELOG) corrective regimen sliding scale   SubCutaneous three times a day before meals  insulin lispro (ADMELOG) corrective regimen sliding scale   SubCutaneous at bedtime  insulin lispro Injectable (ADMELOG) 10 Unit(s) SubCutaneous before dinner  metFORMIN 500 milliGRAM(s) Oral daily  pantoprazole  Injectable 40 milliGRAM(s) IV Push daily    MEDICATIONS  (PRN):  acetaminophen     Tablet .. 975 milliGRAM(s) Oral every 6 hours PRN Moderate Pain (4 - 6)  dextrose Oral Gel 15 Gram(s) Oral once PRN Blood Glucose LESS THAN 70 milliGRAM(s)/deciliter      Allergies    No Known Allergies    Intolerances        REVIEW OF SYSTEMS:  CONSTITUTIONAL: no changes  EYES: No eye pain, visual disturbances, or discharge  ENMT:  No difficulty hearing, No sinus or throat pain  NECK: No pain or stiffness  RESPIRATORY: No cough, wheezing, chills or hemoptysis; No shortness of breath  CARDIOVASCULAR: No chest pain, palpitations or leg swelling  GASTROINTESTINAL: No abdominal or epigastric pain. No nausea, vomiting, or hematemesis; No diarrhea or constipation. No melena or hematochezia.  GENITOURINARY: No dysuria, frequency, hematuria, or incontinence  NEUROLOGICAL: No headaches, memory loss, loss of strength, numbness, or tremors  SKIN: No itching, burning, rashes, or lesions   ENDOCRINE: No heat or cold intolerance; No hair loss  MUSCULOSKELETAL: No joint pain or swelling; No muscle, back, or extremity pain  PSYCHIATRIC: No depression, anxiety, mood swings, or difficulty sleeping  HEME/LYMPH: No easy bruising, or bleeding gums  ALLERY AND IMMUNOLOGIC: No hives or eczema    Vital Signs Last 24 Hrs  T(C): 37.1 (14 Oct 2024 19:12), Max: 37.1 (14 Oct 2024 19:12)  T(F): 98.7 (14 Oct 2024 19:12), Max: 98.7 (14 Oct 2024 19:12)  HR: 98 (14 Oct 2024 19:12) (78 - 98)  BP: 155/101 (14 Oct 2024 19:12) (148/84 - 161/90)  BP(mean): --  RR: 18 (14 Oct 2024 17:10) (18 - 18)  SpO2: 99% (14 Oct 2024 17:10) (96% - 99%)    Parameters below as of 14 Oct 2024 17:10  Patient On (Oxygen Delivery Method): room air        PHYSICAL EXAM:  GENERAL:   HEAD: Atraumatic, Normocephalic  EYES: PERRLA, conjunctiva and sclera clear  ENMT: No  exudates,; Moist mucous membranes,, No lesions  NECK: Supple, No JVD, Normal thyroid  NERVOUS SYSTEM:  Alert & Oriented,   CHEST/LUNG: Clear to auscultation bilaterally; No rales, rhonchi, wheezing, or rubs  HEART: Regular rate and rhythm; No murmurs, rubs, or gallops  ABDOMEN: Soft, Nontender, Nondistended; Bowel sounds present  EXTREMITIES:  2+ Peripheral Pulses, no edema  SKIN: No rashes or lesions    LABS:      Urinalysis Basic - ( 14 Oct 2024 08:00 )    Color: x / Appearance: x / SG: x / pH: x  Gluc: 295 mg/dL / Ketone: x  / Bili: x / Urobili: x   Blood: x / Protein: x / Nitrite: x   Leuk Esterase: x / RBC: x / WBC x   Sq Epi: x / Non Sq Epi: x / Bacteria: x      CAPILLARY BLOOD GLUCOSE      POCT Blood Glucose.: 196 mg/dL (14 Oct 2024 21:30)  POCT Blood Glucose.: 211 mg/dL (14 Oct 2024 19:01)  POCT Blood Glucose.: 162 mg/dL (14 Oct 2024 16:50)  POCT Blood Glucose.: 231 mg/dL (14 Oct 2024 11:17)  POCT Blood Glucose.: 301 mg/dL (14 Oct 2024 07:54)    Lipid panel:           Thyroid:  Diabetes Tests:  Parathyroid Panel:  Adrenals:  RADIOLOGY & ADDITIONAL TESTS:    Imaging Personally Reviewed:  [ ] YES  [ ] NO    Consultant(s) Notes Reviewed:  [ ] YES  [ ] NO    Care Discussed with Consultants/Other Providers [ ] YES  [ ] NO

## 2024-10-14 NOTE — PROGRESS NOTE ADULT - SUBJECTIVE AND OBJECTIVE BOX
Patient is a 24y old  Male who presents with a chief complaint of Sepsis (14 Oct 2024 10:54)      INTERVAL HPI/OVERNIGHT EVENTS:  Pt was seen and examined, no acute events.      MEDICATIONS  (STANDING):  amLODIPine   Tablet 5 milliGRAM(s) Oral daily  chlorhexidine 2% Cloths 1 Application(s) Topical <User Schedule>  enoxaparin Injectable 40 milliGRAM(s) SubCutaneous every 12 hours  HYDROmorphone  Injectable 0.5 milliGRAM(s) IV Push once  influenza   Vaccine 0.5 milliLiter(s) IntraMuscular once  insulin glargine Injectable (LANTUS) 20 Unit(s) SubCutaneous every morning  insulin lispro (ADMELOG) corrective regimen sliding scale   SubCutaneous three times a day before meals  insulin lispro (ADMELOG) corrective regimen sliding scale   SubCutaneous at bedtime  insulin lispro Injectable (ADMELOG) 8 Unit(s) SubCutaneous three times a day before meals  linezolid  IVPB 600 milliGRAM(s) IV Intermittent every 12 hours  linezolid  IVPB      metFORMIN 500 milliGRAM(s) Oral daily  pantoprazole  Injectable 40 milliGRAM(s) IV Push daily  piperacillin/tazobactam IVPB.. 3.375 Gram(s) IV Intermittent every 8 hours    MEDICATIONS  (PRN):  acetaminophen     Tablet .. 975 milliGRAM(s) Oral every 6 hours PRN Moderate Pain (4 - 6)  oxyCODONE    IR 5 milliGRAM(s) Oral every 6 hours PRN Severe Pain (7 - 10)      Allergies  No Known Allergies        Vital Signs Last 24 Hrs  T(C): 36.7 (14 Oct 2024 11:48), Max: 36.8 (13 Oct 2024 17:09)  T(F): 98 (14 Oct 2024 11:48), Max: 98.3 (13 Oct 2024 17:09)  HR: 78 (14 Oct 2024 11:48) (78 - 79)  BP: 153/60 (14 Oct 2024 11:48) (148/83 - 153/60)  BP(mean): --  RR: 18 (14 Oct 2024 11:48) (18 - 18)  SpO2: 97% (14 Oct 2024 11:48) (96% - 97%)    Parameters below as of 14 Oct 2024 11:48  Patient On (Oxygen Delivery Method): room air        PHYSICAL EXAM:  GENERAL: NAD, lying in bed comfortably  ENT: B/L tonsillar enlargement  HEART: Regular rate and rhythm  LUNGS: Unlabored respirations.  Clear to auscultation bilaterally, no crackles, wheezing, or rhonchi  ABDOMEN: Soft, nontender, nondistended  EXTREMITIES: No clubbing, cyanosis, or edema  scrotal dressing in place  NERVOUS SYSTEM:  A&Ox3      LABS:                        14.0   9.45  )-----------( 331      ( 14 Oct 2024 08:00 )             41.7     10-14    136  |  106  |  10  ----------------------------<  295[H]  3.8   |  23  |  0.99    Ca    9.3      14 Oct 2024 08:00    TPro  7.5  /  Alb  2.7[L]  /  TBili  0.4  /  DBili  x   /  AST  27  /  ALT  60  /  AlkPhos  68  10-14      Urinalysis Basic - ( 14 Oct 2024 08:00 )    Color: x / Appearance: x / SG: x / pH: x  Gluc: 295 mg/dL / Ketone: x  / Bili: x / Urobili: x   Blood: x / Protein: x / Nitrite: x   Leuk Esterase: x / RBC: x / WBC x   Sq Epi: x / Non Sq Epi: x / Bacteria: x      CAPILLARY BLOOD GLUCOSE      POCT Blood Glucose.: 231 mg/dL (14 Oct 2024 11:17)  POCT Blood Glucose.: 301 mg/dL (14 Oct 2024 07:54)  POCT Blood Glucose.: 264 mg/dL (13 Oct 2024 21:23)  POCT Blood Glucose.: 272 mg/dL (13 Oct 2024 17:17)  POCT Blood Glucose.: 238 mg/dL (13 Oct 2024 16:12)      Culture - Urine (collected 10 Oct 2024 12:50)  Source: Clean Catch Clean Catch (Midstream)  Final Report (11 Oct 2024 15:41):    No growth      RADIOLOGY & ADDITIONAL TESTS:    Imaging Personally Reviewed:  [ ] YES  [ ] NO    Consultant(s) Notes Reviewed:  [ ] YES  [ ] NO    Care Discussed with Consultants/Other Providers [ ] YES  [ ] NO

## 2024-10-15 ENCOUNTER — OUTPATIENT (OUTPATIENT)
Dept: OUTPATIENT SERVICES | Facility: HOSPITAL | Age: 24
LOS: 1 days | Discharge: HOME | End: 2024-10-15
Payer: MEDICAID

## 2024-10-15 DIAGNOSIS — L89.90 PRESSURE ULCER OF UNSPECIFIED SITE, UNSPECIFIED STAGE: ICD-10-CM

## 2024-10-15 LAB
GLUCOSE BLDC GLUCOMTR-MCNC: 120 MG/DL — HIGH (ref 70–99)
GLUCOSE BLDC GLUCOMTR-MCNC: 161 MG/DL — HIGH (ref 70–99)
GLUCOSE BLDC GLUCOMTR-MCNC: 200 MG/DL — HIGH (ref 70–99)
GLUCOSE BLDC GLUCOMTR-MCNC: 239 MG/DL — HIGH (ref 70–99)
HCT VFR BLD CALC: 42.9 % — SIGNIFICANT CHANGE UP (ref 39–50)
HGB BLD-MCNC: 14.2 G/DL — SIGNIFICANT CHANGE UP (ref 13–17)
MCHC RBC-ENTMCNC: 28.5 PG — SIGNIFICANT CHANGE UP (ref 27–34)
MCHC RBC-ENTMCNC: 33.1 G/DL — SIGNIFICANT CHANGE UP (ref 32–36)
MCV RBC AUTO: 86 FL — SIGNIFICANT CHANGE UP (ref 80–100)
NRBC # BLD: 0 /100 WBCS — SIGNIFICANT CHANGE UP (ref 0–0)
PLATELET # BLD AUTO: 331 K/UL — SIGNIFICANT CHANGE UP (ref 150–400)
RBC # BLD: 4.99 M/UL — SIGNIFICANT CHANGE UP (ref 4.2–5.8)
RBC # FLD: 12.5 % — SIGNIFICANT CHANGE UP (ref 10.3–14.5)
WBC # BLD: 9.33 K/UL — SIGNIFICANT CHANGE UP (ref 3.8–10.5)
WBC # FLD AUTO: 9.33 K/UL — SIGNIFICANT CHANGE UP (ref 3.8–10.5)

## 2024-10-15 PROCEDURE — 99183 HYPERBARIC OXYGEN THERAPY: CPT

## 2024-10-15 PROCEDURE — 99232 SBSQ HOSP IP/OBS MODERATE 35: CPT

## 2024-10-15 PROCEDURE — 99183 HYPERBARIC OXYGEN THERAPY: CPT | Mod: 1L

## 2024-10-15 PROCEDURE — 99213 OFFICE O/P EST LOW 20 MIN: CPT

## 2024-10-15 RX ORDER — PANTOPRAZOLE SODIUM 40 MG/1
40 TABLET, DELAYED RELEASE ORAL
Refills: 0 | Status: DISCONTINUED | OUTPATIENT
Start: 2024-10-16 | End: 2024-10-22

## 2024-10-15 RX ORDER — COLLAGENASE CLOSTRIDIUM HIST. 250 UNIT/G
1 OINTMENT (GRAM) TOPICAL DAILY
Refills: 0 | Status: DISCONTINUED | OUTPATIENT
Start: 2024-10-15 | End: 2024-10-22

## 2024-10-15 RX ORDER — MORPHINE SULFATE 30 MG/1
4 TABLET, EXTENDED RELEASE ORAL ONCE
Refills: 0 | Status: DISCONTINUED | OUTPATIENT
Start: 2024-10-15 | End: 2024-10-15

## 2024-10-15 RX ADMIN — Medication 10 UNIT(S): at 12:00

## 2024-10-15 RX ADMIN — Medication 975 MILLIGRAM(S): at 09:24

## 2024-10-15 RX ADMIN — Medication 1: at 12:00

## 2024-10-15 RX ADMIN — PANTOPRAZOLE SODIUM 40 MILLIGRAM(S): 40 TABLET, DELAYED RELEASE ORAL at 12:00

## 2024-10-15 RX ADMIN — MORPHINE SULFATE 4 MILLIGRAM(S): 30 TABLET, EXTENDED RELEASE ORAL at 13:40

## 2024-10-15 RX ADMIN — METFORMIN HYDROCHLORIDE 500 MILLIGRAM(S): 500 TABLET, EXTENDED RELEASE ORAL at 12:00

## 2024-10-15 RX ADMIN — Medication 10 UNIT(S): at 17:04

## 2024-10-15 RX ADMIN — Medication 25 UNIT(S): at 08:07

## 2024-10-15 RX ADMIN — Medication 5 MILLIGRAM(S): at 06:31

## 2024-10-15 RX ADMIN — MORPHINE SULFATE 4 MILLIGRAM(S): 30 TABLET, EXTENDED RELEASE ORAL at 12:43

## 2024-10-15 RX ADMIN — Medication 975 MILLIGRAM(S): at 08:27

## 2024-10-15 RX ADMIN — Medication 40 MILLIGRAM(S): at 17:04

## 2024-10-15 RX ADMIN — Medication 40 MILLIGRAM(S): at 06:36

## 2024-10-15 NOTE — PHYSICAL THERAPY INITIAL EVALUATION ADULT - MODALITIES TREATMENT COMMENTS
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

## 2024-10-15 NOTE — PROGRESS NOTE ADULT - SUBJECTIVE AND OBJECTIVE BOX
Patient is a 24y old  Male who presents with a chief complaint of Sepsis secondary to scrotal cellulitis vs. epididymitis (15 Oct 2024 21:22)      INTERVAL HPI/OVERNIGHT EVENTS:  Pt was seen and examined, no acute events.      MEDICATIONS  (STANDING):  amLODIPine   Tablet 5 milliGRAM(s) Oral daily  chlorhexidine 2% Cloths 1 Application(s) Topical <User Schedule>  collagenase Ointment 1 Application(s) Topical daily  dextrose 5%. 1000 milliLiter(s) (100 mL/Hr) IV Continuous <Continuous>  dextrose 5%. 1000 milliLiter(s) (50 mL/Hr) IV Continuous <Continuous>  dextrose 50% Injectable 25 Gram(s) IV Push once  dextrose 50% Injectable 25 Gram(s) IV Push once  dextrose 50% Injectable 12.5 Gram(s) IV Push once  enoxaparin Injectable 40 milliGRAM(s) SubCutaneous every 12 hours  glucagon  Injectable 1 milliGRAM(s) IntraMuscular once  HYDROmorphone  Injectable 0.5 milliGRAM(s) IV Push once  influenza   Vaccine 0.5 milliLiter(s) IntraMuscular once  insulin glargine Injectable (LANTUS) 25 Unit(s) SubCutaneous every morning  insulin lispro (ADMELOG) corrective regimen sliding scale   SubCutaneous at bedtime  insulin lispro (ADMELOG) corrective regimen sliding scale   SubCutaneous three times a day before meals  insulin lispro Injectable (ADMELOG) 10 Unit(s) SubCutaneous before breakfast  insulin lispro Injectable (ADMELOG) 10 Unit(s) SubCutaneous before lunch  insulin lispro Injectable (ADMELOG) 10 Unit(s) SubCutaneous before dinner  metFORMIN 500 milliGRAM(s) Oral daily    MEDICATIONS  (PRN):  acetaminophen     Tablet .. 975 milliGRAM(s) Oral every 6 hours PRN Moderate Pain (4 - 6)  dextrose Oral Gel 15 Gram(s) Oral once PRN Blood Glucose LESS THAN 70 milliGRAM(s)/deciliter      Allergies    No Known Allergies    Intolerances          Vital Signs Last 24 Hrs  T(C): 36.8 (15 Oct 2024 12:13), Max: 36.8 (15 Oct 2024 12:13)  T(F): 98.3 (15 Oct 2024 12:13), Max: 98.3 (15 Oct 2024 12:13)  HR: 80 (15 Oct 2024 12:13) (73 - 80)  BP: 147/97 (15 Oct 2024 12:13) (144/89 - 147/97)  BP(mean): --  RR: 18 (15 Oct 2024 12:13) (18 - 18)  SpO2: 98% (15 Oct 2024 12:13) (98% - 98%)    Parameters below as of 15 Oct 2024 12:13  Patient On (Oxygen Delivery Method): room air        PHYSICAL EXAM:  GENERAL: NAD  HEART: Regular rate and rhythm  LUNGS: Unlabored respirations.  Clear to auscultation bilaterally, no crackles, wheezing, or rhonchi  ABDOMEN: Soft, nontender, nondistended  EXTREMITIES: No clubbing, cyanosis, or edema  scrotal dressing in place  NERVOUS SYSTEM:  A&Ox3, non focal        LABS:                        14.2   9.33  )-----------( 331      ( 15 Oct 2024 05:35 )             42.9     10-14    136  |  106  |  10  ----------------------------<  295[H]  3.8   |  23  |  0.99    Ca    9.3      14 Oct 2024 08:00    TPro  7.5  /  Alb  2.7[L]  /  TBili  0.4  /  DBili  x   /  AST  27  /  ALT  60  /  AlkPhos  68  10-14      Urinalysis Basic - ( 14 Oct 2024 08:00 )    Color: x / Appearance: x / SG: x / pH: x  Gluc: 295 mg/dL / Ketone: x  / Bili: x / Urobili: x   Blood: x / Protein: x / Nitrite: x   Leuk Esterase: x / RBC: x / WBC x   Sq Epi: x / Non Sq Epi: x / Bacteria: x      CAPILLARY BLOOD GLUCOSE      POCT Blood Glucose.: 239 mg/dL (15 Oct 2024 22:18)  POCT Blood Glucose.: 120 mg/dL (15 Oct 2024 16:39)  POCT Blood Glucose.: 161 mg/dL (15 Oct 2024 11:49)  POCT Blood Glucose.: 200 mg/dL (15 Oct 2024 07:45)      RADIOLOGY & ADDITIONAL TESTS:    Imaging Personally Reviewed:  [ ] YES  [ ] NO    Consultant(s) Notes Reviewed:  [ ] YES  [ ] NO    Care Discussed with Consultants/Other Providers [ ] YES  [ ] NO

## 2024-10-15 NOTE — PHYSICAL THERAPY INITIAL EVALUATION ADULT - GENERAL OBSERVATIONS, REHAB EVAL
Ptw a srecieved in supine, AxOX4, NAD, agreed for Scrotal wound eval by female PT, with Female surgical PA, Madeline, Reyes

## 2024-10-15 NOTE — PROGRESS NOTE ADULT - ASSESSMENT
24M with Paul's gangrene now POD#1 S/P scrotal exploration, excision/debridement of paul's gangrene  see brief op report which showed:  purulent drainage upon entering scrotum, devitalized tissue excised, slough debrided from b/l scrotum  had fever leukocytosis, tachycardia     10/10: downgraded to the medical floor, no fevers since 10/8, RA, WBC normalized, Cr ok, BCs, UC and surgical culture NGTD, Vancomycin and clindamycin were stopped, Zosyn IV and Linezolid continued.   10/11: no fever, RA,  no new cbc, Cr ok surgical cx and BCs NGTD, the wound is being monitored by urology / surgery, possibly will need further debridement next week, pending hospital course. Linezolid and Zosyn IV continued.   10/14: doing well, no fevers, RA, no wbc, Cr ok, surgical cultures growing Prevotella, today is day # 7 post op, will stop abx after today's doses.    10/15: no fever, RA, no wbc, BCs NGTD, surgical culture is growing Prevotella, pt is being monitored off abx.     #Paul's gangrene   #Cellulitis   #Sepsis  #newly Diabetes mellitus type 2  #STD screening     Plan:  course of abx is complete, pt is being monitored off abx   local wound care, daily dressing change per surgical team    DVT prophylaxis, Incentive Spirometer, OOB, pain control  trend wbc count   hyperbaric oxygen therapy per vascular / wound care team   HIV screen is negative   Gc/chlamydia - negative   syphilis screen negative   newly diagnosed DM2-needs good insulin regimen, endocrinology consult is appreciated   recognize he is insulin naive and could be very sensitive to insulin doses and need to be mindful of doses to avoid hypoglycemic episodes   urology follow up is appreciated    will discuss with with Dr. Briones      24M with Paul's gangrene now POD#1 S/P scrotal exploration, excision/debridement of paul's gangrene  see brief op report which showed:  purulent drainage upon entering scrotum, devitalized tissue excised, slough debrided from b/l scrotum  had fever leukocytosis, tachycardia     10/10: downgraded to the medical floor, no fevers since 10/8, RA, WBC normalized, Cr ok, BCs, UC and surgical culture NGTD, Vancomycin and clindamycin were stopped, Zosyn IV and Linezolid continued.   10/11: no fever, RA,  no new cbc, Cr ok surgical cx and BCs NGTD, the wound is being monitored by urology / surgery, possibly will need further debridement next week, pending hospital course. Linezolid and Zosyn IV continued.   10/14: doing well, no fevers, RA, no wbc, Cr ok, surgical cultures growing Prevotella, today is day # 7 post op, will stop abx after today's doses.    10/15: no fever, RA, no wbc, BCs NGTD, surgical culture is growing Prevotella, pt is being monitored off abx.     #Paul's gangrene   #Cellulitis   #Sepsis  #newly Diabetes mellitus type 2  #STD screening     Plan:  course of abx is complete, pt is being monitored off abx   local wound care, daily dressing change per surgical team    DVT prophylaxis, Incentive Spirometer, OOB, pain control  trend wbc count   hyperbaric oxygen therapy per vascular / wound care team   HIV screen is negative   Gc/chlamydia - negative   syphilis screen negative   newly diagnosed DM2-needs good insulin regimen, endocrinology consult is appreciated   recognize he is insulin naive and could be very sensitive to insulin doses and need to be mindful of doses to avoid hypoglycemic episodes   urology follow up is appreciated    signing off    will discuss with with Dr. Briones

## 2024-10-15 NOTE — CHART NOTE - NSCHARTNOTEFT_GEN_A_CORE
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 Urology consulted; pt to OR for I&D and tissue debrided from b/l scrotum; hyperbaric therapy initiated. Hospital course complicated by new onset T2DM.      Factors impacting intake: [X ] none [ ] nausea  [ ] vomiting [ ] diarrhea [ ] constipation  [ ]chewing problems [ ] swallowing issues  [ ] other:     Diet Prescription: Diet, Consistent Carbohydrate w/Evening Snack (10-08-24 @ 15:06)    Intake:   Pt eating well; % of meals    Current Weight:   122 kg (10/12); admission wt 124.2 kg (10/7)  % Weight Change:  1.8% wt loss x 5 days    Pt c 1+ edema noted b/l feet    Pertinent Medications: MEDICATIONS  (STANDING):  amLODIPine   Tablet 5 milliGRAM(s) Oral daily  chlorhexidine 2% Cloths 1 Application(s) Topical <User Schedule>  collagenase Ointment 1 Application(s) Topical daily  dextrose 5%. 1000 milliLiter(s) (100 mL/Hr) IV Continuous <Continuous>  dextrose 5%. 1000 milliLiter(s) (50 mL/Hr) IV Continuous <Continuous>  dextrose 50% Injectable 25 Gram(s) IV Push once  dextrose 50% Injectable 25 Gram(s) IV Push once  dextrose 50% Injectable 12.5 Gram(s) IV Push once  enoxaparin Injectable 40 milliGRAM(s) SubCutaneous every 12 hours  glucagon  Injectable 1 milliGRAM(s) IntraMuscular once  HYDROmorphone  Injectable 0.5 milliGRAM(s) IV Push once  influenza   Vaccine 0.5 milliLiter(s) IntraMuscular once  insulin glargine Injectable (LANTUS) 25 Unit(s) SubCutaneous every morning  insulin lispro (ADMELOG) corrective regimen sliding scale   SubCutaneous three times a day before meals  insulin lispro (ADMELOG) corrective regimen sliding scale   SubCutaneous at bedtime  insulin lispro Injectable (ADMELOG) 10 Unit(s) SubCutaneous before breakfast  insulin lispro Injectable (ADMELOG) 10 Unit(s) SubCutaneous before lunch  insulin lispro Injectable (ADMELOG) 10 Unit(s) SubCutaneous before dinner  metFORMIN 500 milliGRAM(s) Oral daily    MEDICATIONS  (PRN):  acetaminophen     Tablet .. 975 milliGRAM(s) Oral every 6 hours PRN Moderate Pain (4 - 6)  dextrose Oral Gel 15 Gram(s) Oral once PRN Blood Glucose LESS THAN 70 milliGRAM(s)/deciliter    Pertinent Labs: 10-14 Na136 mmol/L Glu 295 mg/dL[H] K+ 3.8 mmol/L Cr  0.99 mg/dL BUN 10 mg/dL 10-10 Phos 3.0 mg/dL 10-14 Alb 2.7 g/dL[L]  10-07-24  A1C 13.0%; 10-14 POCT:  301, 231, 162, 211, 196    Skin:   no pressure ulcers noted    Estimated Needs:   [ X] no change since previous assessment  [ ] recalculated:     Previous Nutrition Diagnosis:   [X  ] Altered Nutrition-Related Labs  Etiology:  Endocrine dysfunction  Signs & Symptoms:  A1c 13%; new onset dx of DM    GOAL:  Pt to verbalize understanding of need for moderated CHO intake for improved glycemic control - met     Nutrition Diagnosis is [X ] ongoing  [ ] resolved [ ] not applicable     New Nutrition Diagnosis: [X ] not applicable      Interventions:  continue current diet rx as noted  Recommend  [ ] Change Diet To:  [ ] Nutrition Supplement  [ ] Nutrition Support  [X ] Other:   provided nutritional counseling regarding DM diet recommendations; educational material provided ([employer] handout); appropriate questions asked & answered    Monitoring and Evaluation:   [X ] PO intake [ x ] Tolerance to diet prescription [ x ] weights [ x ] labs[ x ] follow up per protocol  [ ] other:

## 2024-10-15 NOTE — PHYSICAL THERAPY INITIAL EVALUATION ADULT - ADDITIONAL COMMENTS
As per pt, he lives in a house with 4 steps with b/l rails, once inside there are 14 steps with right rail up to his bed room, bath room. He was independent in all functional mobility using  no AD.

## 2024-10-15 NOTE — PHYSICAL THERAPY INITIAL EVALUATION ADULT - PERTINENT HX OF CURRENT PROBLEM, REHAB EVAL
Pain in the scrotum   As per Hospitalist notes on 10/14"24 year old male with no significant PMHx who presented to the ED on 10/6/24 for complaints of left testicular pain. Pt admitted to medicine service for sepsis likely from a scrotal abscess. Urology following and decision was made to take pt to OR for I&D scrotal abscess. 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 Minimal EBL, given 1L IVF. No pressors administered. Pt extubated post-op with no complications. Transferred to ICU for hemodynamic monitoring.       Sepsis likely in the setting of paul's gangrene from scrotal abscess   - On Zosyn and Zyvox  on hyperbaric wound therapy daily  Dr. Vera cleared that silver hydrofiber can be placed despite hyperbaric wound therapy to Janelle, surgical PA thru teams

## 2024-10-15 NOTE — PROGRESS NOTE ADULT - SUBJECTIVE AND OBJECTIVE BOX
Patient seen and examined at bedside resting comfortably.   Offers no complaints at this time.   Denies fever, chills, N/V/D, CP, SOB.     Vital Signs Last 24 Hrs  T(F): 98.3 (10-15-24 @ 12:13), Max: 98.7 (10-14-24 @ 19:12)  HR: 80 (10-15-24 @ 12:13)  BP: 147/97 (10-15-24 @ 12:13)  RR: 18 (10-15-24 @ 12:13)  SpO2: 98% (10-15-24 @ 12:13)  POCT Blood Glucose.: 161 mg/dL (15 Oct 2024 11:49)    PHYSICAL EXAM:  GENERAL: Alert, NAD  CHEST/LUNG: Clear to auscultation bilaterally, respirations nonlabored  HEART: Regular rate and rhythm; S1 & S2 appreciated  ABDOMEN: + Bowel sounds, soft, Nontender, Nondistended  : uncircumcised phallus, adequate meatus. Scrotal skin absent anteriorly, post op debridement site. Site clean with healthy granulation tissue at base of wound, no purulence or active drainage. Area washed with saline and dressing applied.  EXTREMITIES:  no calf tenderness, No edema    I&O's Detail    14 Oct 2024 07:01  -  15 Oct 2024 07:00  --------------------------------------------------------  IN:    IV PiggyBack: 100 mL  Total IN: 100 mL    OUT:  Total OUT: 0 mL    Total NET: 100 mL          LABS:                        14.2   9.33  )-----------( 331      ( 15 Oct 2024 05:35 )             42.9     10-14    136  |  106  |  10  ----------------------------<  295[H]  3.8   |  23  |  0.99    Ca    9.3      14 Oct 2024 08:00    TPro  7.5  /  Alb  2.7[L]  /  TBili  0.4  /  DBili  x   /  AST  27  /  ALT  60  /  AlkPhos  68  10-14    RADIOLOGY & ADDITIONAL STUDIES:    A/P  24M POD#7 s/p scrotal exploration and excision and debridement of paul's gangrene  - abx per ID  - local wound care per PT wound recs  - continue hyperbarics  - continue care per primary team   - will d/w urologist

## 2024-10-15 NOTE — PROGRESS NOTE ADULT - SUBJECTIVE AND OBJECTIVE BOX
CHA MCCAIN JR  MRN-[id number]  24y (2000)    Follow Up:  paul's gangrene     Interval History: The pt was seen and examined earlier, not in acute distress, no new complaints, awake and alert, appropriate, out of bed to chair, s/p dressing change earlier. Pt is afebrile, RA, no WBC.     PAST MEDICAL & SURGICAL HISTORY:  No pertinent past medical history          ROS:    [ ] Unobtainable because:  [ x] All other systems negative    Constitutional: no fever, no chills  Head: no trauma  Eyes: no vision changes, no eye pain  ENT:  no sore throat, no rhinorrhea  Cardiovascular:  no chest pain, no palpitation  Respiratory:  no SOB, no cough  GI:  no abd pain, no vomiting, no diarrhea  urinary: no dysuria, no hematuria, no flank pain  musculoskeletal:  no joint pain, no joint swelling  skin:  no rash, surgical site pain is controlled s/p dressing change   neurology:  no headache, no seizure, no change in mental status  psych: no anxiety, no depression         Allergies  No Known Allergies        ANTIMICROBIALS:      OTHER MEDS:  acetaminophen     Tablet .. 975 milliGRAM(s) Oral every 6 hours PRN  amLODIPine   Tablet 5 milliGRAM(s) Oral daily  chlorhexidine 2% Cloths 1 Application(s) Topical <User Schedule>  collagenase Ointment 1 Application(s) Topical daily  dextrose 5%. 1000 milliLiter(s) IV Continuous <Continuous>  dextrose 5%. 1000 milliLiter(s) IV Continuous <Continuous>  dextrose 50% Injectable 25 Gram(s) IV Push once  dextrose 50% Injectable 25 Gram(s) IV Push once  dextrose 50% Injectable 12.5 Gram(s) IV Push once  dextrose Oral Gel 15 Gram(s) Oral once PRN  enoxaparin Injectable 40 milliGRAM(s) SubCutaneous every 12 hours  glucagon  Injectable 1 milliGRAM(s) IntraMuscular once  HYDROmorphone  Injectable 0.5 milliGRAM(s) IV Push once  influenza   Vaccine 0.5 milliLiter(s) IntraMuscular once  insulin glargine Injectable (LANTUS) 25 Unit(s) SubCutaneous every morning  insulin lispro (ADMELOG) corrective regimen sliding scale   SubCutaneous at bedtime  insulin lispro (ADMELOG) corrective regimen sliding scale   SubCutaneous three times a day before meals  insulin lispro Injectable (ADMELOG) 10 Unit(s) SubCutaneous before breakfast  insulin lispro Injectable (ADMELOG) 10 Unit(s) SubCutaneous before lunch  insulin lispro Injectable (ADMELOG) 10 Unit(s) SubCutaneous before dinner  metFORMIN 500 milliGRAM(s) Oral daily      Vital Signs Last 24 Hrs  T(C): 36.8 (15 Oct 2024 12:13), Max: 37.1 (14 Oct 2024 19:12)  T(F): 98.3 (15 Oct 2024 12:13), Max: 98.7 (14 Oct 2024 19:12)  HR: 80 (15 Oct 2024 12:13) (73 - 98)  BP: 147/97 (15 Oct 2024 12:13) (144/89 - 161/90)  BP(mean): --  RR: 18 (15 Oct 2024 12:13) (18 - 18)  SpO2: 98% (15 Oct 2024 12:13) (98% - 99%)    Parameters below as of 15 Oct 2024 12:13  Patient On (Oxygen Delivery Method): room air        Physical Exam:  Constitutional: non-toxic, no distress  HEAD/EYES: anicteric, no conjunctival injection  ENT:  supple, no thrush  Cardiovascular:   normal S1, S2, no murmur, no edema  Respiratory:  clear BS bilaterally, no wheezes, no rales  GI:  soft, non-tender, normal bowel sounds  :  no dewitt, no CVA tenderness, scrotal area is dressed - dressing was changed earlier by surgery   Musculoskeletal:  no synovitis, normal ROM  Neurologic: awake and alert, normal strength, no focal findings  Skin:  no rash, no erythema, no phlebitis  Heme/Onc: no lymphadenopathy   Psychiatric:  awake, alert, appropriate mood    WBC Count: 9.33 K/uL (10-15 @ 05:35)  WBC Count: 9.45 K/uL (10-14 @ 08:00)  WBC Count: 10.83 K/uL (10-13 @ 05:25)  WBC Count: 9.50 K/uL (10-10 @ 07:45)  WBC Count: 10.65 K/uL (10-09 @ 03:00)                            14.2   9.33  )-----------( 331      ( 15 Oct 2024 05:35 )             42.9       10-14    136  |  106  |  10  ----------------------------<  295[H]  3.8   |  23  |  0.99    Ca    9.3      14 Oct 2024 08:00    TPro  7.5  /  Alb  2.7[L]  /  TBili  0.4  /  DBili  x   /  AST  27  /  ALT  60  /  AlkPhos  68  10-14      Urinalysis Basic - ( 14 Oct 2024 08:00 )    Color: x / Appearance: x / SG: x / pH: x  Gluc: 295 mg/dL / Ketone: x  / Bili: x / Urobili: x   Blood: x / Protein: x / Nitrite: x   Leuk Esterase: x / RBC: x / WBC x   Sq Epi: x / Non Sq Epi: x / Bacteria: x        Creatinine Trend: 0.99<--, 0.92<--, 0.84<--, 0.80<--, 1.00<--, 1.17<--      MICROBIOLOGY:  v  Clean Catch Clean Catch (Midstream)  10-10-24   No growth  --  --      .Surgical Swab  10-07-24   Moderate Prevotella disiens "Susceptibilities not performed"  Moderate Prevotella timonensis "Susceptibilities not performed"  --  --      Clean Catch Clean Catch (Midstream)  10-06-24   <10,000 CFU/mL Normal Urogenital Henny  --  --      .Blood BLOOD  10-06-24   No growth at 5 days  --  --      .Blood BLOOD  10-06-24   No growth at 5 days  --  --    Clean Catch Clean Catch (Midstream)  10-04-24   <10,000 CFU/mL Normal Urogenital Henny  --  --    HIV-1 RNA Quantitative, Viral Load Log: NOT DET. lg /mL (10-09-24 @ 03:00)    C-Reactive Protein: 231 (10-09)    RADIOLOGY:

## 2024-10-15 NOTE — PHARMACOTHERAPY INTERVENTION NOTE - COMMENTS
Recommended to switch vancomycin and clindamycin to linezolid for treatment of paul's gangrene. Pt on maximum typically used dose of vancomycin with subtherapeutic AUC. 
New onset of T2DM; went over insulin therapy as well as diabetes education as a whole i.e. spacing meals, taking medications as prescribed, life-style modifications. In addition to verbal education, patient was provided educational material. No questions on this encounter by the patient. 
Patient tolerating PO diet; recommended to switch from IV protonix to PO. Provider agreed and ask the order to be switched. Thank you!
Recommended to adjust vancomycin dose from 1750 mg IV q12h to 1500 mg IV q8h since the vancomycin level today, 10/8 was 2.5 mg/L. As per [employer] calculations, the steady-state vancomycin AUC/BEVERLY on the former dose is 331 mg/L*h, which is less than the therapeutic range of 400 - 600 mg/L*h. Increasing the dose is predicted to yield an AUC/BEVERLY of 425 mg/L*h.    Sumit Strange, PharmD, BCIDP  Clinical Pharmacy Specialist, Infectious Diseases  Tele-Antimicrobial Stewardship Program (Tele-ASP)  Tele-ASP Phone: [phone]  
Currently on 20 units of lantus; blood sugars in high 200s; recommended to adjust glargine dose. Provider approved and requested to enter the order with new dose
Recommended to increase insulin given blood sugars are still in mid 200s to 300s. Provide accepted and requested to enter adjusted doses.

## 2024-10-15 NOTE — PROGRESS NOTE ADULT - SUBJECTIVE AND OBJECTIVE BOX
Patient is a 24y old  Male who presents with a chief complaint of Sepsis secondary to scrotal cellulitis vs. epididymitis (16 Oct 2024 09:27)      Interval History: on Lantus 25 units and prandial lispro 10 units   Finger-sticks are in high 100's and low 200's   Nutrition fair     MEDICATIONS  (STANDING):  amLODIPine   Tablet 5 milliGRAM(s) Oral daily  chlorhexidine 2% Cloths 1 Application(s) Topical <User Schedule>  collagenase Ointment 1 Application(s) Topical daily  dextrose 5%. 1000 milliLiter(s) (100 mL/Hr) IV Continuous <Continuous>  dextrose 5%. 1000 milliLiter(s) (50 mL/Hr) IV Continuous <Continuous>  dextrose 50% Injectable 25 Gram(s) IV Push once  dextrose 50% Injectable 25 Gram(s) IV Push once  dextrose 50% Injectable 12.5 Gram(s) IV Push once  enoxaparin Injectable 40 milliGRAM(s) SubCutaneous every 12 hours  glucagon  Injectable 1 milliGRAM(s) IntraMuscular once  HYDROmorphone  Injectable 0.5 milliGRAM(s) IV Push once  influenza   Vaccine 0.5 milliLiter(s) IntraMuscular once  insulin glargine Injectable (LANTUS) 25 Unit(s) SubCutaneous every morning  insulin lispro (ADMELOG) corrective regimen sliding scale   SubCutaneous three times a day before meals  insulin lispro (ADMELOG) corrective regimen sliding scale   SubCutaneous at bedtime  insulin lispro Injectable (ADMELOG) 10 Unit(s) SubCutaneous before breakfast  insulin lispro Injectable (ADMELOG) 10 Unit(s) SubCutaneous before lunch  insulin lispro Injectable (ADMELOG) 10 Unit(s) SubCutaneous before dinner  metFORMIN 500 milliGRAM(s) Oral daily  pantoprazole    Tablet 40 milliGRAM(s) Oral before breakfast    MEDICATIONS  (PRN):  acetaminophen     Tablet .. 975 milliGRAM(s) Oral every 6 hours PRN Moderate Pain (4 - 6)  dextrose Oral Gel 15 Gram(s) Oral once PRN Blood Glucose LESS THAN 70 milliGRAM(s)/deciliter      Allergies    No Known Allergies    Intolerances        REVIEW OF SYSTEMS:  CONSTITUTIONAL: no changes  EYES: No eye pain, visual disturbances, or discharge  ENMT:  No difficulty hearing, No sinus or throat pain  NECK: No pain or stiffness  RESPIRATORY: No cough, wheezing, chills or hemoptysis; No shortness of breath  CARDIOVASCULAR: No chest pain, palpitations or leg swelling  GASTROINTESTINAL: No abdominal or epigastric pain. No nausea, vomiting, or hematemesis; No diarrhea or constipation. No melena or hematochezia.  GENITOURINARY: No dysuria, frequency, hematuria, or incontinence  NEUROLOGICAL: No headaches, memory loss, loss of strength, numbness, or tremors  SKIN: No itching, burning, rashes, or lesions   ENDOCRINE: No heat or cold intolerance; No hair loss  MUSCULOSKELETAL: No joint pain or swelling; No muscle, back, or extremity pain  PSYCHIATRIC: No depression, anxiety, mood swings, or difficulty sleeping  HEME/LYMPH: No easy bruising, or bleeding gums  ALLERY AND IMMUNOLOGIC: No hives or eczema    Vital Signs Last 24 Hrs  T(C): 36.5 (16 Oct 2024 17:28), Max: 36.8 (16 Oct 2024 05:00)  T(F): 97.7 (16 Oct 2024 17:28), Max: 98.2 (16 Oct 2024 05:00)  HR: 82 (16 Oct 2024 17:28) (68 - 82)  BP: 154/95 (16 Oct 2024 17:28) (137/74 - 154/95)  BP(mean): --  RR: 18 (16 Oct 2024 17:28) (17 - 18)  SpO2: 96% (16 Oct 2024 17:28) (96% - 98%)    Parameters below as of 16 Oct 2024 11:08  Patient On (Oxygen Delivery Method): room air        PHYSICAL EXAM:  GENERAL:   HEAD: Atraumatic, Normocephalic  EYES: PERRLA, conjunctiva and sclera clear  ENMT: No  exudates,; Moist mucous membranes,, No lesions  NECK: Supple, No JVD, Normal thyroid  NERVOUS SYSTEM:  Alert & Oriented,   CHEST/LUNG: Clear to auscultation bilaterally; No rales, rhonchi, wheezing, or rubs  HEART: Regular rate and rhythm; No murmurs, rubs, or gallops  ABDOMEN: Soft, Nontender, Nondistended; Bowel sounds present  EXTREMITIES:  2+ Peripheral Pulses, no edema  SKIN: No rashes or lesions    LABS:        CAPILLARY BLOOD GLUCOSE      POCT Blood Glucose.: 197 mg/dL (16 Oct 2024 16:56)  POCT Blood Glucose.: 171 mg/dL (16 Oct 2024 11:30)  POCT Blood Glucose.: 160 mg/dL (16 Oct 2024 07:53)  POCT Blood Glucose.: 239 mg/dL (15 Oct 2024 22:18)    Lipid panel:           Thyroid:  Diabetes Tests:  Parathyroid Panel:  Adrenals:  RADIOLOGY & ADDITIONAL TESTS:    Imaging Personally Reviewed:  [ ] YES  [ ] NO    Consultant(s) Notes Reviewed:  [ ] YES  [ ] NO    Care Discussed with Consultants/Other Providers [ ] YES  [ ] NO

## 2024-10-15 NOTE — PHYSICAL THERAPY INITIAL EVALUATION ADULT - LEVEL OF INDEPENDENCE: SCOOT/BRIDGE, REHAB EVAL
Future  -     CBC with Auto Differential; Future    Prediabetes  -     Comprehensive Metabolic Panel; Future  -     CBC with Auto Differential; Future  -     Hemoglobin A1C; Future    Dyslipidemia  -     Comprehensive Metabolic Panel; Future  -     Lipid Panel; Future  -     CBC with Auto Differential; Future  --Mediterranean diet, exercise, weight loss, vitamins    We have a long talk on cholesterol and importance of lowering it       Other fatigue  -     TSH; Future  -     Uric Acid; Future  -     Comprehensive Metabolic Panel; Future  -     CBC with Auto Differential; Future    Bronchitis  -     XR CHEST (2 VW); Future  -     Comprehensive Metabolic Panel; Future  -     CBC with Auto Differential; Future    CVID (common variable immunodeficiency) (HCC)  -     IgG, IgA, IgM; Future    Adult bronchiectasis [facility])  Long talk on treatment and prevention  Literature is given   --PLAN--aerosol accuneb 1.25 plus chest percussion--Rx      Chronic obstructive pulmonary disease, unspecified COPD type ([address])  Long talk on treatment and prevention  Literature is given   --PLAN--aerosol accuneb 1.25 plus chest percussion--Rx      Pulmonary Mycobacterium avium complex (MAC) infection ([address])  Long talk on treatment and prevention  Literature is given       Major depressive disorder, single episode, mild ([address])  Long talk on treatment and prevention  Literature is given   Counseling is given for anxiety and depression   All questions were taken and answers are given        Other orders  -     benzonatate (TESSALON) 200 MG capsule; Take 1 capsule by mouth 3 times daily as needed for Cough  -     Discontinue: doxycycline hyclate (VIBRA-TABS) 100 MG tablet; Take 1 tablet by mouth 2 times daily for 10 days  -     azelastine (ASTELIN) 0.1 % nasal spray; 1 spray by Nasal route 2 times daily Use in each nostril as directed  -     minocycline (MINOCIN) 100 MG capsule;  Take 1 capsule by mouth 2 times daily        Outpatient Encounter independent

## 2024-10-15 NOTE — PROGRESS NOTE ADULT - ASSESSMENT
24 year old male with no significant PMHx who presented to the ED on 10/6/24 for complaints of left testicular pain. Pt admitted to medicine service for sepsis likely from a scrotal abscess. Urology following and decision was made to take pt to OR for I&D scrotal abscess. 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 Minimal EBL, given 1L IVF. No pressors administered. Pt extubated post-op with no complications. Transferred to ICU for hemodynamic monitoring.       Sepsis likely in the setting of paul's gangrene from scrotal abscess   - Completed course of Zosyn and Zyvox  - ID following   - urology following, will appreciate recs, wound healing with Hyperbaric oxygen, daily wound dressing now bt RN, order is in place   - dewitt removed, voiding     ETHAN not on CPAP,  will use NIV if needed   - satting adequately on supplemental oxygen, maintain sats>92%   - Outpt sleepstudy    DM2 with hyperglycemia:  - Newly diagnosed  - A1c 13  - Increased Lantus to 25 U, add pre meal insulin 10 U  - DM education     HTN:  - BP elevated, added Amlodipine 5 mg QD.

## 2024-10-16 LAB
GLUCOSE BLDC GLUCOMTR-MCNC: 160 MG/DL — HIGH (ref 70–99)
GLUCOSE BLDC GLUCOMTR-MCNC: 171 MG/DL — HIGH (ref 70–99)
GLUCOSE BLDC GLUCOMTR-MCNC: 178 MG/DL — HIGH (ref 70–99)
GLUCOSE BLDC GLUCOMTR-MCNC: 197 MG/DL — HIGH (ref 70–99)

## 2024-10-16 PROCEDURE — 99183 HYPERBARIC OXYGEN THERAPY: CPT | Mod: 1L

## 2024-10-16 PROCEDURE — 99232 SBSQ HOSP IP/OBS MODERATE 35: CPT

## 2024-10-16 PROCEDURE — 99221 1ST HOSP IP/OBS SF/LOW 40: CPT

## 2024-10-16 RX ADMIN — Medication 40 MILLIGRAM(S): at 17:17

## 2024-10-16 RX ADMIN — PANTOPRAZOLE SODIUM 40 MILLIGRAM(S): 40 TABLET, DELAYED RELEASE ORAL at 05:41

## 2024-10-16 RX ADMIN — Medication 975 MILLIGRAM(S): at 19:03

## 2024-10-16 RX ADMIN — Medication 1: at 17:15

## 2024-10-16 RX ADMIN — Medication 40 MILLIGRAM(S): at 05:41

## 2024-10-16 RX ADMIN — METFORMIN HYDROCHLORIDE 500 MILLIGRAM(S): 500 TABLET, EXTENDED RELEASE ORAL at 17:15

## 2024-10-16 RX ADMIN — Medication 5 MILLIGRAM(S): at 05:41

## 2024-10-16 RX ADMIN — Medication 975 MILLIGRAM(S): at 20:03

## 2024-10-16 RX ADMIN — Medication 10 UNIT(S): at 17:16

## 2024-10-16 RX ADMIN — Medication 25 UNIT(S): at 08:53

## 2024-10-16 RX ADMIN — Medication 1 APPLICATION(S): at 11:33

## 2024-10-16 NOTE — PROGRESS NOTE ADULT - SUBJECTIVE AND OBJECTIVE BOX
Hospitalist Daily Progress Note     *SUBJECTIVE*    Interval Events:  NAEON, Resting comfortably. HD Stable.      *OBJECTIVE*    PHYSICAL EXAM:  GENERAL: NAD  HEART: Regular rate and rhythm  LUNGS: Unlabored respirations.  Clear to auscultation bilaterally, no crackles, wheezing, or rhonchi  ABDOMEN: Soft, nontender, nondistended  EXTREMITIES: No clubbing, cyanosis, or edema  scrotal dressing in place  NERVOUS SYSTEM:  A&Ox3, non focal    OBJECTIVE DATA:   Vital Signs Last 24 Hrs  T(C): 36.8 (16 Oct 2024 05:00), Max: 36.8 (15 Oct 2024 12:13)  T(F): 98.2 (16 Oct 2024 05:00), Max: 98.3 (15 Oct 2024 12:13)  HR: 68 (16 Oct 2024 05:00) (68 - 81)  BP: 137/74 (16 Oct 2024 05:00) (137/74 - 151/84)  BP(mean): --  RR: 18 (16 Oct 2024 05:00) (18 - 18)  SpO2: 98% (16 Oct 2024 05:00) (98% - 98%)    Parameters below as of 16 Oct 2024 05:00  Patient On (Oxygen Delivery Method): room air               Daily     Daily     Labs, Interval Radiology studies, Medications reviewed by me

## 2024-10-16 NOTE — PROGRESS NOTE ADULT - ASSESSMENT
24 year old male with no significant PMHx who presented to the ED on 10/6/24 for complaints of left testicular pain. Pt admitted to medicine service for sepsis likely from a scrotal abscess. Urology following and decision was made to take pt to OR for I&D scrotal abscess. 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 Minimal EBL, given 1L IVF. No pressors administered. Pt extubated post-op with no complications. Transferred to ICU for hemodynamic monitoring.     ##Sepsis, resolved   ##Paul's gangrene  #Scrotal abscess   Presented to the ED on 10/6/24 for complaints of left testicular pain. Pt admitted to medicine service for sepsis likely from a scrotal abscess. Urology following and decision was made to take pt to OR for I&D scrotal abscess. Intra-op found to have paul's gangrene w/ purulent drainage from scrotum and tissue was debrided from b/l scrotum.Completed course of Zosyn and Zyvox  - ID following   - urology following, will appreciate recs, wound healing with Hyperbaric oxygen, daily wound dressing now bt RN, order is in place   - dewitt removed, voiding     #ETHAN   - Satting adequately on supplemental oxygen, maintain sats>92%   - Will need Outpatient sleep study    #Type 2 DM with hyperglycemia:  - Newly diagnosed, A1c 13  - C/w Lantus 25 U, Pre meal 10 U TID   - DM education     #HTN  - C/w Amlodipine 5 mg QD.    Diet: CCD   DVT prophylaxis: Lovenox   Dispo: Admit   Code Status: FC     I have spent a total of *** minutes to prepare to see the patient, obtaining and reviewing history, physical examination, explaining the diagnosis, prognosis and treatment plan with the patient/family/caregiver. I also have spent the time ordering studies and testing, interpreting results, medicine reconciliation, IDRs, subspecialty consultation and documentation as above.     24 year old male with no significant PMHx who presented to the ED on 10/6/24 for complaints of left testicular pain. Pt admitted to medicine service for sepsis likely from a scrotal abscess. Urology following and decision was made to take pt to OR for I&D scrotal abscess. 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 Minimal EBL, given 1L IVF. No pressors administered. Pt extubated post-op with no complications. Transferred to ICU for hemodynamic monitoring.     ##Sepsis, resolved   ##Paul's gangrene  #Scrotal abscess   Presented to the ED on 10/6/24 for complaints of left testicular pain. Pt admitted to medicine service for sepsis likely from a scrotal abscess. Urology following and decision was made to take pt to OR for I&D scrotal abscess. 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 Completed course of Zosyn and Zyvox. dewitt removed, voiding   - C/w wound healing with Hyperbaric oxygen, daily wound dressing now bt RN, order is in place   - urology, ID following    #ETHAN   - Satting adequately on supplemental oxygen, maintain sats>92%   - Will need Outpatient sleep study    #Type 2 DM with hyperglycemia:  - Newly diagnosed, A1c 13  - C/w Lantus 25 U, Pre meal 10 U TID   - DM education     #HTN  - C/w Amlodipine 5 mg QD.    Diet: CCD   DVT prophylaxis: Lovenox   Dispo: Admit   Code Status: FC     I have spent a total of 41 minutes to prepare to see the patient, obtaining and reviewing history, physical examination, explaining the diagnosis, prognosis and treatment plan with the patient/family/caregiver. I also have spent the time ordering studies and testing, interpreting results, medicine reconciliation, IDRs, subspecialty consultation and documentation as above.

## 2024-10-16 NOTE — PROGRESS NOTE ADULT - SUBJECTIVE AND OBJECTIVE BOX
Patient is a 24y old  Male who presents with a chief complaint of Sepsis secondary to scrotal cellulitis vs. epididymitis (16 Oct 2024 09:27)      Interval History: on Lantus 25 units and prandial lispro 10 units   finger sticks are in 100s   Nutrition adequate     MEDICATIONS  (STANDING):  amLODIPine   Tablet 5 milliGRAM(s) Oral daily  chlorhexidine 2% Cloths 1 Application(s) Topical <User Schedule>  collagenase Ointment 1 Application(s) Topical daily  dextrose 5%. 1000 milliLiter(s) (50 mL/Hr) IV Continuous <Continuous>  dextrose 5%. 1000 milliLiter(s) (100 mL/Hr) IV Continuous <Continuous>  dextrose 50% Injectable 25 Gram(s) IV Push once  dextrose 50% Injectable 25 Gram(s) IV Push once  dextrose 50% Injectable 12.5 Gram(s) IV Push once  enoxaparin Injectable 40 milliGRAM(s) SubCutaneous every 12 hours  glucagon  Injectable 1 milliGRAM(s) IntraMuscular once  HYDROmorphone  Injectable 0.5 milliGRAM(s) IV Push once  influenza   Vaccine 0.5 milliLiter(s) IntraMuscular once  insulin glargine Injectable (LANTUS) 25 Unit(s) SubCutaneous every morning  insulin lispro (ADMELOG) corrective regimen sliding scale   SubCutaneous at bedtime  insulin lispro (ADMELOG) corrective regimen sliding scale   SubCutaneous three times a day before meals  insulin lispro Injectable (ADMELOG) 10 Unit(s) SubCutaneous before breakfast  insulin lispro Injectable (ADMELOG) 10 Unit(s) SubCutaneous before lunch  insulin lispro Injectable (ADMELOG) 10 Unit(s) SubCutaneous before dinner  metFORMIN 500 milliGRAM(s) Oral daily  pantoprazole    Tablet 40 milliGRAM(s) Oral before breakfast    MEDICATIONS  (PRN):  acetaminophen     Tablet .. 975 milliGRAM(s) Oral every 6 hours PRN Moderate Pain (4 - 6)  dextrose Oral Gel 15 Gram(s) Oral once PRN Blood Glucose LESS THAN 70 milliGRAM(s)/deciliter      Allergies    No Known Allergies    Intolerances        REVIEW OF SYSTEMS:  CONSTITUTIONAL: no changes  EYES: No eye pain, visual disturbances, or discharge  ENMT:  No difficulty hearing, No sinus or throat pain  NECK: No pain or stiffness  RESPIRATORY: No cough, wheezing, chills or hemoptysis; No shortness of breath  CARDIOVASCULAR: No chest pain, palpitations or leg swelling  GASTROINTESTINAL: No abdominal or epigastric pain. No nausea, vomiting, or hematemesis; No diarrhea or constipation. No melena or hematochezia.  GENITOURINARY: No dysuria, frequency, hematuria, or incontinence  NEUROLOGICAL: No headaches, memory loss, loss of strength, numbness, or tremors  SKIN: No itching, burning, rashes, or lesions   ENDOCRINE: No heat or cold intolerance; No hair loss  MUSCULOSKELETAL: No joint pain or swelling; No muscle, back, or extremity pain  PSYCHIATRIC: No depression, anxiety, mood swings, or difficulty sleeping  HEME/LYMPH: No easy bruising, or bleeding gums  ALLERY AND IMMUNOLOGIC: No hives or eczema    Vital Signs Last 24 Hrs  T(C): 36.5 (16 Oct 2024 17:28), Max: 36.8 (16 Oct 2024 05:00)  T(F): 97.7 (16 Oct 2024 17:28), Max: 98.2 (16 Oct 2024 05:00)  HR: 82 (16 Oct 2024 17:28) (68 - 82)  BP: 154/95 (16 Oct 2024 17:28) (137/74 - 154/95)  BP(mean): --  RR: 18 (16 Oct 2024 17:28) (17 - 18)  SpO2: 96% (16 Oct 2024 17:28) (96% - 98%)    Parameters below as of 16 Oct 2024 11:08  Patient On (Oxygen Delivery Method): room air        PHYSICAL EXAM:  GENERAL:   HEAD: Atraumatic, Normocephalic  EYES: PERRLA, conjunctiva and sclera clear  ENMT: No  exudates,; Moist mucous membranes,, No lesions  NECK: Supple, No JVD, Normal thyroid  NERVOUS SYSTEM:  Alert & Oriented,   CHEST/LUNG: Clear to auscultation bilaterally; No rales, rhonchi, wheezing, or rubs  HEART: Regular rate and rhythm; No murmurs, rubs, or gallops  ABDOMEN: Soft, Nontender, Nondistended; Bowel sounds present  EXTREMITIES:  2+ Peripheral Pulses, no edema  SKIN: No rashes or lesions    LABS:        CAPILLARY BLOOD GLUCOSE      POCT Blood Glucose.: 197 mg/dL (16 Oct 2024 16:56)  POCT Blood Glucose.: 171 mg/dL (16 Oct 2024 11:30)  POCT Blood Glucose.: 160 mg/dL (16 Oct 2024 07:53)  POCT Blood Glucose.: 239 mg/dL (15 Oct 2024 22:18)    Lipid panel:           Thyroid:  Diabetes Tests:  Parathyroid Panel:  Adrenals:  RADIOLOGY & ADDITIONAL TESTS:    Imaging Personally Reviewed:  [ ] YES  [ ] NO    Consultant(s) Notes Reviewed:  [ ] YES  [ ] NO    Care Discussed with Consultants/Other Providers [ ] YES  [ ] NO

## 2024-10-17 LAB
GLUCOSE BLDC GLUCOMTR-MCNC: 139 MG/DL — HIGH (ref 70–99)
GLUCOSE BLDC GLUCOMTR-MCNC: 140 MG/DL — HIGH (ref 70–99)
GLUCOSE BLDC GLUCOMTR-MCNC: 147 MG/DL — HIGH (ref 70–99)
GLUCOSE BLDC GLUCOMTR-MCNC: 195 MG/DL — HIGH (ref 70–99)
GLUCOSE BLDC GLUCOMTR-MCNC: 274 MG/DL — HIGH (ref 70–99)

## 2024-10-17 PROCEDURE — 99183 HYPERBARIC OXYGEN THERAPY: CPT

## 2024-10-17 PROCEDURE — 99232 SBSQ HOSP IP/OBS MODERATE 35: CPT

## 2024-10-17 RX ORDER — AMLODIPINE BESYLATE 10 MG
1 TABLET ORAL
Qty: 25 | Refills: 0
Start: 2024-10-17 | End: 2024-11-10

## 2024-10-17 RX ADMIN — Medication 975 MILLIGRAM(S): at 14:29

## 2024-10-17 RX ADMIN — Medication 25 UNIT(S): at 08:39

## 2024-10-17 RX ADMIN — Medication 1: at 17:23

## 2024-10-17 RX ADMIN — Medication 975 MILLIGRAM(S): at 15:20

## 2024-10-17 RX ADMIN — Medication 5 MILLIGRAM(S): at 06:00

## 2024-10-17 RX ADMIN — Medication 40 MILLIGRAM(S): at 17:24

## 2024-10-17 RX ADMIN — Medication 40 MILLIGRAM(S): at 06:00

## 2024-10-17 RX ADMIN — CHLORHEXIDINE GLUCONATE 1 APPLICATION(S): 40 SOLUTION TOPICAL at 06:02

## 2024-10-17 RX ADMIN — Medication 10 UNIT(S): at 17:23

## 2024-10-17 RX ADMIN — PANTOPRAZOLE SODIUM 40 MILLIGRAM(S): 40 TABLET, DELAYED RELEASE ORAL at 06:02

## 2024-10-17 NOTE — PROGRESS NOTE ADULT - SUBJECTIVE AND OBJECTIVE BOX
Hospitalist Daily Progress Note     *SUBJECTIVE*    Interval Events:  NAEON, Resting comfortably. HD Stable.      *OBJECTIVE*    PHYSICAL EXAM:  GENERAL: NAD  HEART: Regular rate and rhythm  LUNGS: Unlabored respirations.  Clear to auscultation bilaterally, no crackles, wheezing, or rhonchi  ABDOMEN: Soft, nontender, nondistended  EXTREMITIES: No clubbing, cyanosis, or edema  scrotal dressing in place  NERVOUS SYSTEM:  A&Ox3, non focal    OBJECTIVE DATA:   Vital Signs Last 24 Hrs  T(C): 36.4 (17 Oct 2024 05:14), Max: 36.9 (16 Oct 2024 23:25)  T(F): 97.6 (17 Oct 2024 05:14), Max: 98.4 (16 Oct 2024 23:25)  HR: 72 (17 Oct 2024 05:14) (72 - 87)  BP: 144/81 (17 Oct 2024 05:14) (134/73 - 154/95)  BP(mean): --  RR: 18 (17 Oct 2024 05:14) (17 - 18)  SpO2: 98% (17 Oct 2024 05:14) (96% - 99%)    Parameters below as of 17 Oct 2024 05:14  Patient On (Oxygen Delivery Method): room air               Daily     Daily Weight in k.3 (17 Oct 2024 05:14)    Labs, Interval Radiology studies, Medications reviewed by me

## 2024-10-17 NOTE — DISCHARGE NOTE PROVIDER - NSDCCAREPROVSEEN_GEN_ALL_CORE_FT
Srinath Junior, Kodi Murphy , Srinath Wiley, Jovany Barney, Kodi Fischer, [facility]

## 2024-10-17 NOTE — DISCHARGE NOTE PROVIDER - NSDCMRMEDTOKEN_GEN_ALL_CORE_FT
amLODIPine 5 mg oral tablet: 1 tab(s) orally once a day   Admelog 100 units/mL injectable solution: 10 unit(s) injectable 3 times a day (before meals)  amLODIPine 5 mg oral tablet: 1 tab(s) orally once a day  amLODIPine 5 mg oral tablet: 1 tab(s) orally once a day  insulin glargine 100 units/mL subcutaneous solution: 25 unit(s) subcutaneous once a day (in the morning)  metFORMIN 500 mg oral tablet: 1 tab(s) orally once a day  nystatin 100,000 units/g topical cream: 1 Apply topically to affected area 2 times a day

## 2024-10-17 NOTE — DISCHARGE NOTE PROVIDER - NSFOLLOWUPCLINICS_GEN_ALL_ED_FT
[facility]  Wound Care  [address] [place] [address]  Phone: [phone]  Fax: [phone]  Established Patient  Follow Up Time: Routine     [facility]  Wound Care  [address] [place] [address]  Phone: [phone]  Fax: [phone]  Established Patient  Follow Up Time: Routine    [facility]  Endocrinology  [address] [place] [address]  Phone: [phone]  Fax: [phone]

## 2024-10-17 NOTE — DISCHARGE NOTE PROVIDER - HOSPITAL COURSE
24 year old male with no significant PMHx who presented to the ED on 10/6/24 for complaints of left testicular pain. Pt admitted to medicine service for sepsis likely from a scrotal abscess. Urology following and decision was made to take pt to OR for I&D scrotal abscess. 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 Minimal EBL, given 1L IVF. No pressors administered. Pt extubated post-op with no complications. Transferred to ICU for hemodynamic monitoring.     ##Sepsis, resolved   ##Paul's gangrene  #Scrotal abscess   Presented to the ED on 10/6/24 for complaints of left testicular pain. Pt admitted to medicine service for sepsis likely from a scrotal abscess. Urology following and decision was made to take pt to OR for I&D scrotal abscess. 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 Completed course of Zosyn and Zyvox. dewitt removed, voiding   - C/w wound healing with Hyperbaric oxygen, daily wound dressing now bt RN, order is in place   - urology, ID following: course of abx is complete, pt is being monitored off abx   local wound care, daily dressing change per surgical team      #ETHAN   - Satting adequately on supplemental oxygen, maintain sats>92%   - Will need Outpatient sleep study    #Type 2 DM with hyperglycemia:  - Newly diagnosed, A1c 13  - C/w Lantus 25 U, Pre meal 10 U TID   - DM education     #HTN  - C/w Amlodipine 5 mg QD.    Diet: CCD   DVT prophylaxis: Lovenox   Code Status: FC    24 year old male with no significant PMHx who presented to the ED on 10/6/24 for complaints of left testicular pain. Pt admitted to medicine service for sepsis likely from a scrotal abscess. Urology following and decision was made to take pt to OR for I&D scrotal abscess. 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 Minimal EBL, given 1L IVF. No pressors administered. Pt extubated post-op with no complications. Transferred to ICU for hemodynamic monitoring.     ##Sepsis, resolved   ##Paul's gangrene  #Scrotal abscess   Presented to the ED on 10/6/24 for complaints of left testicular pain. Pt admitted to medicine service for sepsis likely from a scrotal abscess. Urology following and decision was made to take pt to OR for I&D scrotal abscess. 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 Completed course of Zosyn and Zyvox. dewitt removed, voiding   - Completed inpatient hyperbaric oxygen on 10/18. No more sessions needed Per Dr. Vera.   - C/w daily wound dressing changes     #ETHAN   - Satting adequately on supplemental oxygen, maintain sats>92%   - Will need Outpatient sleep study    #Type 2 DM with hyperglycemia:  - Newly diagnosed, A1c 13  - C/w Lantus 25 U, Pre meal 10 U TID   - DM education     #HTN  - C/w Amlodipine 5 mg QD.

## 2024-10-17 NOTE — DISCHARGE NOTE PROVIDER - NSDCFUADDAPPT_GEN_ALL_CORE_FT
APPTS ARE READY TO BE MADE: [X] YES    Best Family or Patient Contact (if needed):    Additional Information about above appointments (if needed):    [facility]  [address] [facility]  Phone: [phone]    Other comments or requests:

## 2024-10-17 NOTE — DISCHARGE NOTE PROVIDER - NSDCCPCAREPLAN_GEN_ALL_CORE_FT
PRINCIPAL DISCHARGE DIAGNOSIS  Diagnosis: Cellulitis, scrotum  Assessment and Plan of Treatment: Sepsis resolved. Continue with Hyperbaric O2 treatment      SECONDARY DISCHARGE DIAGNOSES  Diagnosis: Sepsis  Assessment and Plan of Treatment: sepsis resolved    Diagnosis: Bandemia  Assessment and Plan of Treatment:

## 2024-10-17 NOTE — PROGRESS NOTE ADULT - SUBJECTIVE AND OBJECTIVE BOX
Patient seen and examined bedside resting comfortably.  No complaints offered.   Voiding without difficulty.  Denies hematuria, dysuria, nausea vomitin, diarrhea, fevers, chills, abd pain.  Tolerating diet.      T(F): 98.2 (10-17-24 @ 11:41), Max: 98.4 (10-16-24 @ 23:25)  HR: 77 (10-17-24 @ 11:41) (72 - 87)  BP: 138/84 (10-17-24 @ 11:41) (134/73 - 154/95)  RR: 17 (10-17-24 @ 11:41) (17 - 18)  SpO2: 99% (10-17-24 @ 11:41) (96% - 99%)  Wt(kg): --  CAPILLARY BLOOD GLUCOSE      POCT Blood Glucose.: 139 mg/dL (17 Oct 2024 11:38)  POCT Blood Glucose.: 147 mg/dL (17 Oct 2024 08:18)  POCT Blood Glucose.: 178 mg/dL (16 Oct 2024 21:22)  POCT Blood Glucose.: 197 mg/dL (16 Oct 2024 16:56)      PHYSICAL EXAM:  General: NAD, alert and awake  HEENT: NCAT, EOMI, conjunctiva clear  Chest: nonlabored respirations, good inspiratory effort  Extremities: no pedal edema or calf tenderness noted   : scrotal dressing C/D/I    LABS:        I&O's Detail    16 Oct 2024 07:01  -  17 Oct 2024 07:00  --------------------------------------------------------  IN:    Oral Fluid: 620 mL  Total IN: 620 mL    OUT:    Blood Loss (mL): 0 mL  Total OUT: 0 mL    Total NET: 620 mL        A/P: 25 yo M POD10 s/p scrotal exploration, excision and debridement of paul's gangrene  off antibiotics since 10/14 per ID  pt started HBOT on 10/9 at wound care  -c/w PT wound care/RN daily dressing changes  -no further urological intervention  -pt cleared for d/c from  standpoint with home wound care and outpt Follow up wialeena Martin  -will sign off, please reconsult PRN    Patient seen and examined bedside resting comfortably.  No complaints offered.   Voiding without difficulty.  Denies hematuria, dysuria, nausea vomitin, diarrhea, fevers, chills, abd pain.  Tolerating diet.      T(F): 98.2 (10-17-24 @ 11:41), Max: 98.4 (10-16-24 @ 23:25)  HR: 77 (10-17-24 @ 11:41) (72 - 87)  BP: 138/84 (10-17-24 @ 11:41) (134/73 - 154/95)  RR: 17 (10-17-24 @ 11:41) (17 - 18)  SpO2: 99% (10-17-24 @ 11:41) (96% - 99%)  Wt(kg): --  CAPILLARY BLOOD GLUCOSE      POCT Blood Glucose.: 139 mg/dL (17 Oct 2024 11:38)  POCT Blood Glucose.: 147 mg/dL (17 Oct 2024 08:18)  POCT Blood Glucose.: 178 mg/dL (16 Oct 2024 21:22)  POCT Blood Glucose.: 197 mg/dL (16 Oct 2024 16:56)      PHYSICAL EXAM:  General: NAD, alert and awake  HEENT: NCAT, EOMI, conjunctiva clear  Chest: nonlabored respirations, good inspiratory effort  Extremities: no pedal edema or calf tenderness noted   : scrotal dressing C/D/I    LABS:        I&O's Detail    16 Oct 2024 07:01  -  17 Oct 2024 07:00  --------------------------------------------------------  IN:    Oral Fluid: 620 mL  Total IN: 620 mL    OUT:    Blood Loss (mL): 0 mL  Total OUT: 0 mL    Total NET: 620 mL        A/P: 23 yo M POD10 s/p scrotal exploration, excision and debridement of paul's gangrene  off antibiotics since 10/14 per ID  pt started HBOT on 10/9 at wound care  -c/w PT wound care/RN daily dressing changes  -no further urological intervention  -pt cleared for d/c from  standpoint with home wound care and outpt Follow up with Dr Martin  -will sign off, please reconsult PRN

## 2024-10-17 NOTE — PROGRESS NOTE ADULT - ASSESSMENT
24 year old male with no significant PMHx who presented to the ED on 10/6/24 for complaints of left testicular pain. Pt admitted to medicine service for sepsis likely from a scrotal abscess. Urology following and decision was made to take pt to OR for I&D scrotal abscess. 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 Minimal EBL, given 1L IVF. No pressors administered. Pt extubated post-op with no complications. Transferred to ICU for hemodynamic monitoring.     ##Sepsis, resolved   ##Paul's gangrene  #Scrotal abscess   Presented to the ED on 10/6/24 for complaints of left testicular pain. Pt admitted to medicine service for sepsis likely from a scrotal abscess. Urology following and decision was made to take pt to OR for I&D scrotal abscess. 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 Completed course of Zosyn and Zyvox. dewitt removed, voiding. S/p Inpatient hyperbaric oxygen   - C/w daily wound dressing now bt RN, order is in place   - urology, ID following    #ETHAN   - Satting adequately on supplemental oxygen, maintain sats>92%   - Will need Outpatient sleep study    #Type 2 DM with hyperglycemia:  - Newly diagnosed, A1c 13  - C/w Lantus 25 U, Pre meal 10 U TID   - DM education   - Endo following-> d/c on current regimen    #HTN  - C/w Amlodipine 5 mg QD.    Diet: CCD   DVT prophylaxis: Lovenox   Dispo: Admit   Code Status: FC     I have spent a total of *** minutes to prepare to see the patient, obtaining and reviewing history, physical examination, explaining the diagnosis, prognosis and treatment plan with the patient/family/caregiver. I also have spent the time ordering studies and testing, interpreting results, medicine reconciliation, IDRs, subspecialty consultation and documentation as above.     24 year old male with no significant PMHx who presented to the ED on 10/6/24 for complaints of left testicular pain. Pt admitted to medicine service for sepsis likely from a scrotal abscess. Urology following and decision was made to take pt to OR for I&D scrotal abscess. 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 Minimal EBL, given 1L IVF. No pressors administered. Pt extubated post-op with no complications. Transferred to ICU for hemodynamic monitoring.     ##Sepsis, resolved   ##Paul's gangrene  #Scrotal abscess   Presented to the ED on 10/6/24 for complaints of left testicular pain. Pt admitted to medicine service for sepsis likely from a scrotal abscess. Urology following and decision was made to take pt to OR for I&D scrotal abscess. 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 Completed course of Zosyn and Zyvox. dewitt removed, voiding. S/p Inpatient hyperbaric oxygen. No more sessions needed Per Dr. Vera.   - C/w daily wound dressing now bt RN, order is in place   - urology, ID following    #ETHAN   - Satting adequately on supplemental oxygen, maintain sats>92%   - Will need Outpatient sleep study    #Type 2 DM with hyperglycemia:  - Newly diagnosed, A1c 13  - C/w Lantus 25 U, Pre meal 10 U TID   - DM education   - Endo following-> d/c on current regimen    #HTN  - C/w Amlodipine 5 mg QD.    Diet: CCD   DVT prophylaxis: Lovenox   Dispo: Admit   Code Status: FC     I have spent a total of 35 minutes to prepare to see the patient, obtaining and reviewing history, physical examination, explaining the diagnosis, prognosis and treatment plan with the patient/family/caregiver. I also have spent the time ordering studies and testing, interpreting results, medicine reconciliation, IDRs, subspecialty consultation and documentation as above.

## 2024-10-17 NOTE — PROGRESS NOTE ADULT - SUBJECTIVE AND OBJECTIVE BOX
Patient is a 24y old  Male who presents with a chief complaint of Sepsis secondary to scrotal cellulitis vs. epididymitis (18 Oct 2024 12:56)      Interval History: finger sticks are in 100s , fair PO intake  on Lantus 25 units and prandial lispro 10 units     MEDICATIONS  (STANDING):  amLODIPine   Tablet 5 milliGRAM(s) Oral daily  chlorhexidine 2% Cloths 1 Application(s) Topical <User Schedule>  collagenase Ointment 1 Application(s) Topical daily  dextrose 5%. 1000 milliLiter(s) (50 mL/Hr) IV Continuous <Continuous>  dextrose 5%. 1000 milliLiter(s) (100 mL/Hr) IV Continuous <Continuous>  dextrose 50% Injectable 25 Gram(s) IV Push once  dextrose 50% Injectable 25 Gram(s) IV Push once  dextrose 50% Injectable 12.5 Gram(s) IV Push once  enoxaparin Injectable 40 milliGRAM(s) SubCutaneous every 12 hours  glucagon  Injectable 1 milliGRAM(s) IntraMuscular once  HYDROmorphone  Injectable 0.5 milliGRAM(s) IV Push once  influenza   Vaccine 0.5 milliLiter(s) IntraMuscular once  insulin glargine Injectable (LANTUS) 25 Unit(s) SubCutaneous every morning  insulin lispro (ADMELOG) corrective regimen sliding scale   SubCutaneous three times a day before meals  insulin lispro (ADMELOG) corrective regimen sliding scale   SubCutaneous at bedtime  insulin lispro Injectable (ADMELOG) 10 Unit(s) SubCutaneous before breakfast  insulin lispro Injectable (ADMELOG) 10 Unit(s) SubCutaneous before lunch  insulin lispro Injectable (ADMELOG) 10 Unit(s) SubCutaneous before dinner  metFORMIN 500 milliGRAM(s) Oral daily  pantoprazole    Tablet 40 milliGRAM(s) Oral before breakfast    MEDICATIONS  (PRN):  acetaminophen     Tablet .. 975 milliGRAM(s) Oral every 6 hours PRN Moderate Pain (4 - 6)  dextrose Oral Gel 15 Gram(s) Oral once PRN Blood Glucose LESS THAN 70 milliGRAM(s)/deciliter      Allergies    No Known Allergies    Intolerances        REVIEW OF SYSTEMS:  CONSTITUTIONAL: no changes  EYES: No eye pain, visual disturbances, or discharge  ENMT:  No difficulty hearing, No sinus or throat pain  NECK: No pain or stiffness  RESPIRATORY: No cough, wheezing, chills or hemoptysis; No shortness of breath  CARDIOVASCULAR: No chest pain, palpitations or leg swelling  GASTROINTESTINAL: No abdominal or epigastric pain. No nausea, vomiting, or hematemesis; No diarrhea or constipation. No melena or hematochezia.  GENITOURINARY: No dysuria, frequency, hematuria, or incontinence  NEUROLOGICAL: No headaches, memory loss, loss of strength, numbness, or tremors  SKIN: No itching, burning, rashes, or lesions   ENDOCRINE: No heat or cold intolerance; No hair loss  MUSCULOSKELETAL: No joint pain or swelling; No muscle, back, or extremity pain  PSYCHIATRIC: No depression, anxiety, mood swings, or difficulty sleeping  HEME/LYMPH: No easy bruising, or bleeding gums  ALLERY AND IMMUNOLOGIC: No hives or eczema    Vital Signs Last 24 Hrs  T(C): 36.4 (18 Oct 2024 17:26), Max: 36.6 (17 Oct 2024 23:09)  T(F): 97.6 (18 Oct 2024 17:26), Max: 97.9 (17 Oct 2024 23:09)  HR: 83 (18 Oct 2024 17:26) (71 - 83)  BP: 154/77 (18 Oct 2024 17:26) (142/76 - 154/87)  BP(mean): --  RR: 18 (18 Oct 2024 17:26) (17 - 20)  SpO2: 99% (18 Oct 2024 17:26) (99% - 100%)    Parameters below as of 18 Oct 2024 11:50  Patient On (Oxygen Delivery Method): room air        PHYSICAL EXAM:  GENERAL:   HEAD: Atraumatic, Normocephalic  EYES: PERRLA, conjunctiva and sclera clear  ENMT: No  exudates,; Moist mucous membranes,, No lesions  NECK: Supple, No JVD, Normal thyroid  NERVOUS SYSTEM:  Alert & Oriented,   CHEST/LUNG: Clear to auscultation bilaterally; No rales, rhonchi, wheezing, or rubs  HEART: Regular rate and rhythm; No murmurs, rubs, or gallops  ABDOMEN: Soft, Nontender, Nondistended; Bowel sounds present  EXTREMITIES:  2+ Peripheral Pulses, no edema  SKIN: No rashes or lesions    LABS:        CAPILLARY BLOOD GLUCOSE      POCT Blood Glucose.: 108 mg/dL (18 Oct 2024 16:36)  POCT Blood Glucose.: 171 mg/dL (18 Oct 2024 11:42)  POCT Blood Glucose.: 178 mg/dL (18 Oct 2024 08:04)  POCT Blood Glucose.: 140 mg/dL (17 Oct 2024 21:25)    Lipid panel:           Thyroid:  Diabetes Tests:  Parathyroid Panel:  Adrenals:  RADIOLOGY & ADDITIONAL TESTS:    Imaging Personally Reviewed:  [ ] YES  [ ] NO    Consultant(s) Notes Reviewed:  [ ] YES  [ ] NO    Care Discussed with Consultants/Other Providers [ ] YES  [ ] NO

## 2024-10-18 LAB
GLUCOSE BLDC GLUCOMTR-MCNC: 108 MG/DL — HIGH (ref 70–99)
GLUCOSE BLDC GLUCOMTR-MCNC: 171 MG/DL — HIGH (ref 70–99)
GLUCOSE BLDC GLUCOMTR-MCNC: 178 MG/DL — HIGH (ref 70–99)
GLUCOSE BLDC GLUCOMTR-MCNC: 201 MG/DL — HIGH (ref 70–99)

## 2024-10-18 PROCEDURE — 99183 HYPERBARIC OXYGEN THERAPY: CPT | Mod: 1L

## 2024-10-18 PROCEDURE — 99232 SBSQ HOSP IP/OBS MODERATE 35: CPT

## 2024-10-18 RX ADMIN — CHLORHEXIDINE GLUCONATE 1 APPLICATION(S): 40 SOLUTION TOPICAL at 07:26

## 2024-10-18 RX ADMIN — Medication 40 MILLIGRAM(S): at 07:25

## 2024-10-18 RX ADMIN — Medication 40 MILLIGRAM(S): at 17:28

## 2024-10-18 RX ADMIN — PANTOPRAZOLE SODIUM 40 MILLIGRAM(S): 40 TABLET, DELAYED RELEASE ORAL at 07:25

## 2024-10-18 RX ADMIN — Medication 10 UNIT(S): at 17:28

## 2024-10-18 RX ADMIN — Medication 5 MILLIGRAM(S): at 07:25

## 2024-10-18 RX ADMIN — Medication 25 UNIT(S): at 08:39

## 2024-10-18 RX ADMIN — Medication 10 UNIT(S): at 12:28

## 2024-10-18 RX ADMIN — METFORMIN HYDROCHLORIDE 500 MILLIGRAM(S): 500 TABLET, EXTENDED RELEASE ORAL at 12:29

## 2024-10-18 RX ADMIN — Medication 1: at 12:30

## 2024-10-18 RX ADMIN — Medication 1 APPLICATION(S): at 12:26

## 2024-10-18 NOTE — PROGRESS NOTE ADULT - SUBJECTIVE AND OBJECTIVE BOX
Patient is a 24y old  Male who presents with a chief complaint of Sepsis secondary to scrotal cellulitis vs. epididymitis (18 Oct 2024 12:56)      Interval History: finger sticks are in 100s   on Lantus 25 units and prandial lispro 10 units   stable finger sticks     MEDICATIONS  (STANDING):  amLODIPine   Tablet 5 milliGRAM(s) Oral daily  chlorhexidine 2% Cloths 1 Application(s) Topical <User Schedule>  collagenase Ointment 1 Application(s) Topical daily  dextrose 5%. 1000 milliLiter(s) (50 mL/Hr) IV Continuous <Continuous>  dextrose 5%. 1000 milliLiter(s) (100 mL/Hr) IV Continuous <Continuous>  dextrose 50% Injectable 25 Gram(s) IV Push once  dextrose 50% Injectable 25 Gram(s) IV Push once  dextrose 50% Injectable 12.5 Gram(s) IV Push once  enoxaparin Injectable 40 milliGRAM(s) SubCutaneous every 12 hours  glucagon  Injectable 1 milliGRAM(s) IntraMuscular once  HYDROmorphone  Injectable 0.5 milliGRAM(s) IV Push once  influenza   Vaccine 0.5 milliLiter(s) IntraMuscular once  insulin glargine Injectable (LANTUS) 25 Unit(s) SubCutaneous every morning  insulin lispro (ADMELOG) corrective regimen sliding scale   SubCutaneous at bedtime  insulin lispro (ADMELOG) corrective regimen sliding scale   SubCutaneous three times a day before meals  insulin lispro Injectable (ADMELOG) 10 Unit(s) SubCutaneous before breakfast  insulin lispro Injectable (ADMELOG) 10 Unit(s) SubCutaneous before lunch  insulin lispro Injectable (ADMELOG) 10 Unit(s) SubCutaneous before dinner  metFORMIN 500 milliGRAM(s) Oral daily  pantoprazole    Tablet 40 milliGRAM(s) Oral before breakfast    MEDICATIONS  (PRN):  acetaminophen     Tablet .. 975 milliGRAM(s) Oral every 6 hours PRN Moderate Pain (4 - 6)  dextrose Oral Gel 15 Gram(s) Oral once PRN Blood Glucose LESS THAN 70 milliGRAM(s)/deciliter      Allergies    No Known Allergies    Intolerances        REVIEW OF SYSTEMS:  CONSTITUTIONAL: no changes  EYES: No eye pain, visual disturbances, or discharge  ENMT:  No difficulty hearing, No sinus or throat pain  NECK: No pain or stiffness  RESPIRATORY: No cough, wheezing, chills or hemoptysis; No shortness of breath  CARDIOVASCULAR: No chest pain, palpitations or leg swelling  GASTROINTESTINAL: No abdominal or epigastric pain. No nausea, vomiting, or hematemesis; No diarrhea or constipation. No melena or hematochezia.  GENITOURINARY: No dysuria, frequency, hematuria, or incontinence  NEUROLOGICAL: No headaches, memory loss, loss of strength, numbness, or tremors  SKIN: No itching, burning, rashes, or lesions   ENDOCRINE: No heat or cold intolerance; No hair loss  MUSCULOSKELETAL: No joint pain or swelling; No muscle, back, or extremity pain  PSYCHIATRIC: No depression, anxiety, mood swings, or difficulty sleeping  HEME/LYMPH: No easy bruising, or bleeding gums  ALLERY AND IMMUNOLOGIC: No hives or eczema    Vital Signs Last 24 Hrs  T(C): 36.4 (18 Oct 2024 17:26), Max: 36.6 (17 Oct 2024 23:09)  T(F): 97.6 (18 Oct 2024 17:26), Max: 97.9 (17 Oct 2024 23:09)  HR: 83 (18 Oct 2024 17:26) (71 - 83)  BP: 154/77 (18 Oct 2024 17:26) (142/76 - 154/87)  BP(mean): --  RR: 18 (18 Oct 2024 17:26) (17 - 20)  SpO2: 99% (18 Oct 2024 17:26) (99% - 100%)    Parameters below as of 18 Oct 2024 11:50  Patient On (Oxygen Delivery Method): room air        PHYSICAL EXAM:  GENERAL:   HEAD: Atraumatic, Normocephalic  EYES: PERRLA, conjunctiva and sclera clear  ENMT: No  exudates,; Moist mucous membranes,, No lesions  NECK: Supple, No JVD, Normal thyroid  NERVOUS SYSTEM:  Alert & Oriented,   CHEST/LUNG: Clear to auscultation bilaterally; No rales, rhonchi, wheezing, or rubs  HEART: Regular rate and rhythm; No murmurs, rubs, or gallops  ABDOMEN: Soft, Nontender, Nondistended; Bowel sounds present  EXTREMITIES:  2+ Peripheral Pulses, no edema  SKIN: No rashes or lesions    LABS:        CAPILLARY BLOOD GLUCOSE      POCT Blood Glucose.: 108 mg/dL (18 Oct 2024 16:36)  POCT Blood Glucose.: 171 mg/dL (18 Oct 2024 11:42)  POCT Blood Glucose.: 178 mg/dL (18 Oct 2024 08:04)  POCT Blood Glucose.: 140 mg/dL (17 Oct 2024 21:25)    Lipid panel:           Thyroid:  Diabetes Tests:  Parathyroid Panel:  Adrenals:  RADIOLOGY & ADDITIONAL TESTS:    Imaging Personally Reviewed:  [ ] YES  [ ] NO    Consultant(s) Notes Reviewed:  [ ] YES  [ ] NO    Care Discussed with Consultants/Other Providers [ ] YES  [ ] NO

## 2024-10-18 NOTE — PROGRESS NOTE ADULT - SUBJECTIVE AND OBJECTIVE BOX
PROGRESS NOTE:     Patient is a 24y old  Male who presents with a chief complaint of Sepsis secondary to scrotal cellulitis vs. epididymitis (17 Oct 2024 12:39)      SUBJECTIVE / OVERNIGHT EVENTS: No acute events.     ADDITIONAL REVIEW OF SYSTEMS:    MEDICATIONS  (STANDING):  amLODIPine   Tablet 5 milliGRAM(s) Oral daily  chlorhexidine 2% Cloths 1 Application(s) Topical <User Schedule>  collagenase Ointment 1 Application(s) Topical daily  dextrose 5%. 1000 milliLiter(s) (50 mL/Hr) IV Continuous <Continuous>  dextrose 5%. 1000 milliLiter(s) (100 mL/Hr) IV Continuous <Continuous>  dextrose 50% Injectable 25 Gram(s) IV Push once  dextrose 50% Injectable 25 Gram(s) IV Push once  dextrose 50% Injectable 12.5 Gram(s) IV Push once  enoxaparin Injectable 40 milliGRAM(s) SubCutaneous every 12 hours  glucagon  Injectable 1 milliGRAM(s) IntraMuscular once  HYDROmorphone  Injectable 0.5 milliGRAM(s) IV Push once  influenza   Vaccine 0.5 milliLiter(s) IntraMuscular once  insulin glargine Injectable (LANTUS) 25 Unit(s) SubCutaneous every morning  insulin lispro (ADMELOG) corrective regimen sliding scale   SubCutaneous three times a day before meals  insulin lispro (ADMELOG) corrective regimen sliding scale   SubCutaneous at bedtime  insulin lispro Injectable (ADMELOG) 10 Unit(s) SubCutaneous before breakfast  insulin lispro Injectable (ADMELOG) 10 Unit(s) SubCutaneous before lunch  insulin lispro Injectable (ADMELOG) 10 Unit(s) SubCutaneous before dinner  metFORMIN 500 milliGRAM(s) Oral daily  pantoprazole    Tablet 40 milliGRAM(s) Oral before breakfast    MEDICATIONS  (PRN):  acetaminophen     Tablet .. 975 milliGRAM(s) Oral every 6 hours PRN Moderate Pain (4 - 6)  dextrose Oral Gel 15 Gram(s) Oral once PRN Blood Glucose LESS THAN 70 milliGRAM(s)/deciliter      CAPILLARY BLOOD GLUCOSE      POCT Blood Glucose.: 171 mg/dL (18 Oct 2024 11:42)  POCT Blood Glucose.: 178 mg/dL (18 Oct 2024 08:04)  POCT Blood Glucose.: 140 mg/dL (17 Oct 2024 21:25)  POCT Blood Glucose.: 195 mg/dL (17 Oct 2024 16:28)    I&O's Summary    17 Oct 2024 07:01  -  18 Oct 2024 07:00  --------------------------------------------------------  IN: 600 mL / OUT: 0 mL / NET: 600 mL        PHYSICAL EXAM:  Vital Signs Last 24 Hrs  T(C): 36.6 (18 Oct 2024 11:50), Max: 36.6 (17 Oct 2024 23:09)  T(F): 97.9 (18 Oct 2024 11:50), Max: 97.9 (17 Oct 2024 23:09)  HR: 78 (18 Oct 2024 11:50) (71 - 90)  BP: 144/85 (18 Oct 2024 11:50) (139/74 - 154/87)  BP(mean): --  RR: 20 (18 Oct 2024 11:50) (17 - 20)  SpO2: 100% (18 Oct 2024 11:50) (99% - 100%)    Parameters below as of 18 Oct 2024 11:50  Patient On (Oxygen Delivery Method): room air        CONSTITUTIONAL: NAD, well-developed  RESPIRATORY: Normal respiratory effort; lungs are clear to auscultation bilaterally  CARDIOVASCULAR: Regular rate and rhythm, normal S1 and S2, no murmur/rub/gallop; No lower extremity edema; Peripheral pulses are 2+ bilaterally  ABDOMEN: Nontender to palpation, normoactive bowel sounds, no rebound/guarding; No hepatosplenomegaly  : scrotal dressing in place  MUSCLOSKELETAL: no clubbing or cyanosis of digits; no joint swelling or tenderness to palpation  PSYCH: A+O to person, place, and time; affect appropriate    LABS:                      RADIOLOGY & ADDITIONAL TESTS:  Results Reviewed:   Imaging Personally Reviewed:  Electrocardiogram Personally Reviewed:    COORDINATION OF CARE:  Care Discussed with Consultants/Other Providers [Y/N]:  Prior or Outpatient Records Reviewed [Y/N]:

## 2024-10-18 NOTE — PROGRESS NOTE ADULT - ASSESSMENT
24 year old male with no significant PMHx who presented to the ED on 10/6/24 for complaints of left testicular pain. Pt admitted to medicine service for sepsis likely from a scrotal abscess. Urology following and decision was made to take pt to OR for I&D scrotal abscess. 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 Minimal EBL, given 1L IVF. No pressors administered. Pt extubated post-op with no complications.    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 No more sessions needed Per Dr. Vera.   - C/w daily wound dressing changes   - urology, ID following    #ETHAN   - Satting adequately on supplemental oxygen, maintain sats>92%   - Will need Outpatient sleep study    #Type 2 DM with hyperglycemia:  - Newly diagnosed, Hgb A1c 13  - C/w Lantus 25 U, Pre meal 10 U TID   - DM education   - Endo following-> d/c on current regimen    #HTN  - C/w Amlodipine 5 mg QD  - monitor BP    Diet: CCD   DVT prophylaxis: Lovenox   Dispo: Pending rehab placement for wound care        24 year old male with no significant PMHx who presented to the ED on 10/6/24 for complaints of left testicular pain. Pt admitted to medicine service for sepsis likely from a scrotal abscess. Urology following and decision was made to take pt to OR for I&D scrotal abscess. 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 Minimal EBL, given 1L IVF. No pressors administered. Pt extubated post-op with no complications.    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 No more sessions needed Per Dr. Vera.   - C/w daily wound dressing changes   - urology, ID following    #ETHAN   - Satting adequately on supplemental oxygen, maintain sats>92%   - Will need Outpatient sleep study    #Type 2 DM with hyperglycemia:  - Newly diagnosed, Hgb A1c 13  - C/w Lantus 25 U, Pre meal 10 U TID   - DM education   - Endo following-> d/c on current regimen    #HTN  - C/w Amlodipine 5 mg QD  - monitor BP    Diet: CCD   DVT prophylaxis: Lovenox   Dispo: Pending plan for wound care that is compatible w/ his insurance

## 2024-10-19 LAB
GLUCOSE BLDC GLUCOMTR-MCNC: 120 MG/DL — HIGH (ref 70–99)
GLUCOSE BLDC GLUCOMTR-MCNC: 130 MG/DL — HIGH (ref 70–99)
GLUCOSE BLDC GLUCOMTR-MCNC: 142 MG/DL — HIGH (ref 70–99)
GLUCOSE BLDC GLUCOMTR-MCNC: 201 MG/DL — HIGH (ref 70–99)

## 2024-10-19 PROCEDURE — 99232 SBSQ HOSP IP/OBS MODERATE 35: CPT

## 2024-10-19 RX ADMIN — Medication 2: at 08:25

## 2024-10-19 RX ADMIN — METFORMIN HYDROCHLORIDE 500 MILLIGRAM(S): 500 TABLET, EXTENDED RELEASE ORAL at 12:11

## 2024-10-19 RX ADMIN — Medication 40 MILLIGRAM(S): at 17:48

## 2024-10-19 RX ADMIN — Medication 1 APPLICATION(S): at 12:13

## 2024-10-19 RX ADMIN — Medication 10 UNIT(S): at 12:11

## 2024-10-19 RX ADMIN — CHLORHEXIDINE GLUCONATE 1 APPLICATION(S): 40 SOLUTION TOPICAL at 06:17

## 2024-10-19 RX ADMIN — Medication 10 UNIT(S): at 08:25

## 2024-10-19 RX ADMIN — Medication 25 UNIT(S): at 08:26

## 2024-10-19 RX ADMIN — PANTOPRAZOLE SODIUM 40 MILLIGRAM(S): 40 TABLET, DELAYED RELEASE ORAL at 06:16

## 2024-10-19 RX ADMIN — Medication 5 MILLIGRAM(S): at 06:16

## 2024-10-19 RX ADMIN — Medication 10 UNIT(S): at 16:54

## 2024-10-19 NOTE — PROGRESS NOTE ADULT - SUBJECTIVE AND OBJECTIVE BOX
PROGRESS NOTE:     Patient is a 24y old  Male who presents with a chief complaint of Sepsis secondary to scrotal cellulitis vs. epididymitis (19 Oct 2024 10:25)          SUBJECTIVE & OBJECTIVE:   Pt seen and examined at bedside in AM    no overnight events.       REVIEW OF SYSTEMS: remaining ROS negative     PHYSICAL EXAM:  VITALS:  Vital Signs Last 24 Hrs  T(C): 36.3 (19 Oct 2024 10:58), Max: 36.7 (19 Oct 2024 00:00)  T(F): 97.3 (19 Oct 2024 10:58), Max: 98 (19 Oct 2024 00:00)  HR: 83 (19 Oct 2024 10:58) (83 - 89)  BP: 140/80 (19 Oct 2024 10:58) (137/85 - 154/77)  BP(mean): --  RR: 17 (19 Oct 2024 10:58) (17 - 18)  SpO2: 98% (19 Oct 2024 10:58) (98% - 100%)    Parameters below as of 19 Oct 2024 10:58  Patient On (Oxygen Delivery Method): room air          GENERAL: NAD,  no increased WOB  HEAD:  Atraumatic, Normocephalic  EYES: EOMI, conjunctiva and sclera clear  ENMT: Moist mucous membranes  NECK: Supple, No JVD  NERVOUS SYSTEM:  Alert & Oriented   CHEST/LUNG: Clear to auscultation bilaterally; No rales, rhonchi, wheezing  HEART: Regular rate and rhythm  ABDOMEN: Soft, Nontender, Nondistended; Bowel sounds present  : scrotal dressing in place  EXTREMITIES:  No clubbing, cyanosis, calf tenderness or edema b/l      MEDICATIONS  (STANDING):  amLODIPine   Tablet 5 milliGRAM(s) Oral daily  chlorhexidine 2% Cloths 1 Application(s) Topical <User Schedule>  collagenase Ointment 1 Application(s) Topical daily  dextrose 5%. 1000 milliLiter(s) (100 mL/Hr) IV Continuous <Continuous>  dextrose 5%. 1000 milliLiter(s) (50 mL/Hr) IV Continuous <Continuous>  dextrose 50% Injectable 25 Gram(s) IV Push once  dextrose 50% Injectable 25 Gram(s) IV Push once  dextrose 50% Injectable 12.5 Gram(s) IV Push once  enoxaparin Injectable 40 milliGRAM(s) SubCutaneous every 12 hours  glucagon  Injectable 1 milliGRAM(s) IntraMuscular once  HYDROmorphone  Injectable 0.5 milliGRAM(s) IV Push once  influenza   Vaccine 0.5 milliLiter(s) IntraMuscular once  insulin glargine Injectable (LANTUS) 25 Unit(s) SubCutaneous every morning  insulin lispro (ADMELOG) corrective regimen sliding scale   SubCutaneous three times a day before meals  insulin lispro (ADMELOG) corrective regimen sliding scale   SubCutaneous at bedtime  insulin lispro Injectable (ADMELOG) 10 Unit(s) SubCutaneous before breakfast  insulin lispro Injectable (ADMELOG) 10 Unit(s) SubCutaneous before lunch  insulin lispro Injectable (ADMELOG) 10 Unit(s) SubCutaneous before dinner  metFORMIN 500 milliGRAM(s) Oral daily  pantoprazole    Tablet 40 milliGRAM(s) Oral before breakfast    MEDICATIONS  (PRN):  acetaminophen     Tablet .. 975 milliGRAM(s) Oral every 6 hours PRN Moderate Pain (4 - 6)  dextrose Oral Gel 15 Gram(s) Oral once PRN Blood Glucose LESS THAN 70 milliGRAM(s)/deciliter      Allergies    No Known Allergies    Intolerances              LABS:                 CAPILLARY BLOOD GLUCOSE      POCT Blood Glucose.: 142 mg/dL (19 Oct 2024 11:20)  POCT Blood Glucose.: 201 mg/dL (19 Oct 2024 07:59)  POCT Blood Glucose.: 201 mg/dL (18 Oct 2024 21:51)  POCT Blood Glucose.: 108 mg/dL (18 Oct 2024 16:36)                RECENT CULTURES:          RADIOLOGY & ADDITIONAL TESTS:    < from: CT Abdomen and Pelvis w/ IV Cont (10.06.24 @ 15:18) >  IMPRESSION:  Diffuse edema involving the scrotal skin with mild perineal extension. No   focal gas collections    < end of copied text >    Care plan and all findings were discussed in detail with patient.  All questions and concerns addressed

## 2024-10-19 NOTE — PROGRESS NOTE ADULT - ASSESSMENT
24 year old male with no significant PMHx who presented to the ED on 10/6/24 for complaints of left testicular pain. Pt admitted to medicine service for sepsis likely from a scrotal abscess. Urology following and decision was made to take pt to OR for I&D scrotal abscess. 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 Minimal EBL, given 1L IVF. No pressors administered. Pt extubated post-op with no complications.    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 No more sessions needed Per Dr. Vera.   - C/w daily wound dressing changes   - urology, ID following    #ETHAN   - Satting adequately on supplemental oxygen, maintain sats>92%   - Will need Outpatient sleep study    #Type 2 DM with hyperglycemia:  - Newly diagnosed, Hgb A1c 13  - C/w Lantus 25 U, Pre meal 10 U TID   - DM education   - Endo following-> d/c on current regimen    #HTN  - C/w Amlodipine 5 mg QD  - monitor BP    Diet: CCD   DVT prophylaxis: Lovenox   Dispo: Pending insurance authorization

## 2024-10-19 NOTE — PROGRESS NOTE ADULT - SUBJECTIVE AND OBJECTIVE BOX
Patient is a 24y old  Male who presents with a chief complaint of Sepsis secondary to scrotal cellulitis vs. epididymitis (18 Oct 2024 20:16)      Interval History: Diabetes wise stable.  On Lantus 25 units and prandial lispro 10 units and fingersticks are in the high 100s or low 200s nutrition fat    MEDICATIONS  (STANDING):  amLODIPine   Tablet 5 milliGRAM(s) Oral daily  chlorhexidine 2% Cloths 1 Application(s) Topical <User Schedule>  collagenase Ointment 1 Application(s) Topical daily  dextrose 5%. 1000 milliLiter(s) (50 mL/Hr) IV Continuous <Continuous>  dextrose 5%. 1000 milliLiter(s) (100 mL/Hr) IV Continuous <Continuous>  dextrose 50% Injectable 25 Gram(s) IV Push once  dextrose 50% Injectable 25 Gram(s) IV Push once  dextrose 50% Injectable 12.5 Gram(s) IV Push once  enoxaparin Injectable 40 milliGRAM(s) SubCutaneous every 12 hours  glucagon  Injectable 1 milliGRAM(s) IntraMuscular once  HYDROmorphone  Injectable 0.5 milliGRAM(s) IV Push once  influenza   Vaccine 0.5 milliLiter(s) IntraMuscular once  insulin glargine Injectable (LANTUS) 25 Unit(s) SubCutaneous every morning  insulin lispro (ADMELOG) corrective regimen sliding scale   SubCutaneous three times a day before meals  insulin lispro (ADMELOG) corrective regimen sliding scale   SubCutaneous at bedtime  insulin lispro Injectable (ADMELOG) 10 Unit(s) SubCutaneous before breakfast  insulin lispro Injectable (ADMELOG) 10 Unit(s) SubCutaneous before lunch  insulin lispro Injectable (ADMELOG) 10 Unit(s) SubCutaneous before dinner  metFORMIN 500 milliGRAM(s) Oral daily  pantoprazole    Tablet 40 milliGRAM(s) Oral before breakfast    MEDICATIONS  (PRN):  acetaminophen     Tablet .. 975 milliGRAM(s) Oral every 6 hours PRN Moderate Pain (4 - 6)  dextrose Oral Gel 15 Gram(s) Oral once PRN Blood Glucose LESS THAN 70 milliGRAM(s)/deciliter      Allergies    No Known Allergies    Intolerances        REVIEW OF SYSTEMS:  CONSTITUTIONAL: no changes  EYES: No eye pain, visual disturbances, or discharge  ENMT:  No difficulty hearing, No sinus or throat pain  NECK: No pain or stiffness  RESPIRATORY: No cough, wheezing, chills or hemoptysis; No shortness of breath  CARDIOVASCULAR: No chest pain, palpitations or leg swelling  GASTROINTESTINAL: No abdominal or epigastric pain. No nausea, vomiting, or hematemesis; No diarrhea or constipation. No melena or hematochezia.  GENITOURINARY: No dysuria, frequency, hematuria, or incontinence  NEUROLOGICAL: No headaches, memory loss, loss of strength, numbness, or tremors  SKIN: No itching, burning, rashes, or lesions   ENDOCRINE: No heat or cold intolerance; No hair loss  MUSCULOSKELETAL: No joint pain or swelling; No muscle, back, or extremity pain  PSYCHIATRIC: No depression, anxiety, mood swings, or difficulty sleeping  HEME/LYMPH: No easy bruising, or bleeding gums  ALLERY AND IMMUNOLOGIC: No hives or eczema    Vital Signs Last 24 Hrs  T(C): 36.5 (19 Oct 2024 05:08), Max: 36.7 (19 Oct 2024 00:00)  T(F): 97.7 (19 Oct 2024 05:08), Max: 98 (19 Oct 2024 00:00)  HR: 87 (19 Oct 2024 05:08) (78 - 89)  BP: 137/85 (19 Oct 2024 05:08) (137/85 - 154/77)  BP(mean): --  RR: 18 (19 Oct 2024 05:08) (18 - 20)  SpO2: 99% (19 Oct 2024 05:08) (99% - 100%)    Parameters below as of 19 Oct 2024 05:08  Patient On (Oxygen Delivery Method): room air        PHYSICAL EXAM:  GENERAL:   HEAD: Atraumatic, Normocephalic  EYES: PERRLA, conjunctiva and sclera clear  ENMT: No  exudates,; Moist mucous membranes,, No lesions  NECK: Supple, No JVD, Normal thyroid  NERVOUS SYSTEM:  Alert & Oriented,   CHEST/LUNG: Clear to auscultation bilaterally; No rales, rhonchi, wheezing, or rubs  HEART: Regular rate and rhythm; No murmurs, rubs, or gallops  ABDOMEN: Soft, Nontender, Nondistended; Bowel sounds present  EXTREMITIES:  2+ Peripheral Pulses, no edema  SKIN: No rashes or lesions    LABS:        CAPILLARY BLOOD GLUCOSE      POCT Blood Glucose.: 201 mg/dL (19 Oct 2024 07:59)  POCT Blood Glucose.: 201 mg/dL (18 Oct 2024 21:51)  POCT Blood Glucose.: 108 mg/dL (18 Oct 2024 16:36)  POCT Blood Glucose.: 171 mg/dL (18 Oct 2024 11:42)    Lipid panel:           Thyroid:  Diabetes Tests:  Parathyroid Panel:  Adrenals:  RADIOLOGY & ADDITIONAL TESTS:    Imaging Personally Reviewed:  [ ] YES  [ ] NO    Consultant(s) Notes Reviewed:  [ ] YES  [ ] NO    Care Discussed with Consultants/Other Providers [ ] YES  [ ] NO

## 2024-10-20 LAB
GLUCOSE BLDC GLUCOMTR-MCNC: 131 MG/DL — HIGH (ref 70–99)
GLUCOSE BLDC GLUCOMTR-MCNC: 131 MG/DL — HIGH (ref 70–99)
GLUCOSE BLDC GLUCOMTR-MCNC: 143 MG/DL — HIGH (ref 70–99)
GLUCOSE BLDC GLUCOMTR-MCNC: 163 MG/DL — HIGH (ref 70–99)

## 2024-10-20 PROCEDURE — 99232 SBSQ HOSP IP/OBS MODERATE 35: CPT

## 2024-10-20 RX ORDER — NYSTATIN 100000 U/G
1 POWDER TOPICAL
Refills: 0 | Status: DISCONTINUED | OUTPATIENT
Start: 2024-10-20 | End: 2024-10-22

## 2024-10-20 RX ADMIN — Medication 975 MILLIGRAM(S): at 00:18

## 2024-10-20 RX ADMIN — Medication 40 MILLIGRAM(S): at 17:56

## 2024-10-20 RX ADMIN — Medication 10 UNIT(S): at 11:36

## 2024-10-20 RX ADMIN — Medication 10 UNIT(S): at 16:38

## 2024-10-20 RX ADMIN — METFORMIN HYDROCHLORIDE 500 MILLIGRAM(S): 500 TABLET, EXTENDED RELEASE ORAL at 11:36

## 2024-10-20 RX ADMIN — Medication 10 UNIT(S): at 08:26

## 2024-10-20 RX ADMIN — Medication 975 MILLIGRAM(S): at 22:38

## 2024-10-20 RX ADMIN — Medication 975 MILLIGRAM(S): at 01:18

## 2024-10-20 RX ADMIN — Medication 40 MILLIGRAM(S): at 06:05

## 2024-10-20 RX ADMIN — Medication 1 APPLICATION(S): at 11:37

## 2024-10-20 RX ADMIN — Medication 25 UNIT(S): at 08:29

## 2024-10-20 RX ADMIN — Medication 975 MILLIGRAM(S): at 23:38

## 2024-10-20 RX ADMIN — Medication 5 MILLIGRAM(S): at 06:08

## 2024-10-20 RX ADMIN — PANTOPRAZOLE SODIUM 40 MILLIGRAM(S): 40 TABLET, DELAYED RELEASE ORAL at 08:26

## 2024-10-20 NOTE — PROGRESS NOTE ADULT - SUBJECTIVE AND OBJECTIVE BOX
PROGRESS NOTE:     Patient is a 24y old  Male who presents with a chief complaint of Sepsis secondary to scrotal cellulitis vs. epididymitis (19 Oct 2024 10:25)          SUBJECTIVE & OBJECTIVE:   Pt seen and examined at bedside in AM    no overnight events.       REVIEW OF SYSTEMS: remaining ROS negative     PHYSICAL EXAM:  VITALS:  Vital Signs Last 24 Hrs  T(C): 36.8 (20 Oct 2024 10:58), Max: 36.9 (19 Oct 2024 23:22)  T(F): 98.3 (20 Oct 2024 10:58), Max: 98.5 (19 Oct 2024 23:22)  HR: 81 (20 Oct 2024 10:58) (76 - 94)  BP: 136/76 (20 Oct 2024 10:58) (136/76 - 148/87)  BP(mean): --  RR: 17 (20 Oct 2024 10:58) (17 - 19)  SpO2: 99% (20 Oct 2024 10:58) (98% - 99%)    Parameters below as of 20 Oct 2024 10:58  Patient On (Oxygen Delivery Method): room air          GENERAL: NAD,  no increased WOB  HEAD:  Atraumatic, Normocephalic  EYES: EOMI, conjunctiva and sclera clear  ENMT: Moist mucous membranes  NECK: Supple, No JVD  NERVOUS SYSTEM:  Alert & Oriented   CHEST/LUNG: Clear to auscultation bilaterally; No rales, rhonchi, wheezing  HEART: Regular rate and rhythm  ABDOMEN: Soft, Nontender, Nondistended; Bowel sounds present  : scrotal dressing in place  EXTREMITIES:  No clubbing, cyanosis, calf tenderness or edema b/l      MEDICATIONS  (STANDING):  amLODIPine   Tablet 5 milliGRAM(s) Oral daily  chlorhexidine 2% Cloths 1 Application(s) Topical <User Schedule>  collagenase Ointment 1 Application(s) Topical daily  dextrose 5%. 1000 milliLiter(s) (50 mL/Hr) IV Continuous <Continuous>  dextrose 5%. 1000 milliLiter(s) (100 mL/Hr) IV Continuous <Continuous>  dextrose 50% Injectable 25 Gram(s) IV Push once  dextrose 50% Injectable 25 Gram(s) IV Push once  dextrose 50% Injectable 12.5 Gram(s) IV Push once  enoxaparin Injectable 40 milliGRAM(s) SubCutaneous every 12 hours  glucagon  Injectable 1 milliGRAM(s) IntraMuscular once  HYDROmorphone  Injectable 0.5 milliGRAM(s) IV Push once  influenza   Vaccine 0.5 milliLiter(s) IntraMuscular once  insulin glargine Injectable (LANTUS) 25 Unit(s) SubCutaneous every morning  insulin lispro (ADMELOG) corrective regimen sliding scale   SubCutaneous at bedtime  insulin lispro (ADMELOG) corrective regimen sliding scale   SubCutaneous three times a day before meals  insulin lispro Injectable (ADMELOG) 10 Unit(s) SubCutaneous before breakfast  insulin lispro Injectable (ADMELOG) 10 Unit(s) SubCutaneous before lunch  insulin lispro Injectable (ADMELOG) 10 Unit(s) SubCutaneous before dinner  metFORMIN 500 milliGRAM(s) Oral daily  pantoprazole    Tablet 40 milliGRAM(s) Oral before breakfast    MEDICATIONS  (PRN):  acetaminophen     Tablet .. 975 milliGRAM(s) Oral every 6 hours PRN Moderate Pain (4 - 6)  dextrose Oral Gel 15 Gram(s) Oral once PRN Blood Glucose LESS THAN 70 milliGRAM(s)/deciliter      Allergies    No Known Allergies    Intolerances              LABS:                 CAPILLARY BLOOD GLUCOSE      POCT Blood Glucose.: 131 mg/dL (20 Oct 2024 11:25)  POCT Blood Glucose.: 143 mg/dL (20 Oct 2024 07:45)  POCT Blood Glucose.: 130 mg/dL (19 Oct 2024 21:17)  POCT Blood Glucose.: 120 mg/dL (19 Oct 2024 16:44)              RECENT CULTURES:          RADIOLOGY & ADDITIONAL TESTS:    < from: CT Abdomen and Pelvis w/ IV Cont (10.06.24 @ 15:18) >  IMPRESSION:  Diffuse edema involving the scrotal skin with mild perineal extension. No   focal gas collections    < end of copied text >    Care plan and all findings were discussed in detail with patient.  All questions and concerns addressed

## 2024-10-20 NOTE — PROGRESS NOTE ADULT - SUBJECTIVE AND OBJECTIVE BOX
Patient is a 24y old  Male who presents with a chief complaint of Sepsis secondary to scrotal cellulitis vs. epididymitis (20 Oct 2024 12:32)      Interval History: finger sticks are in 100s   on Lantus 25 units and prandial lispro 10 units   Nutrition fair     MEDICATIONS  (STANDING):  amLODIPine   Tablet 5 milliGRAM(s) Oral daily  chlorhexidine 2% Cloths 1 Application(s) Topical <User Schedule>  collagenase Ointment 1 Application(s) Topical daily  dextrose 5%. 1000 milliLiter(s) (100 mL/Hr) IV Continuous <Continuous>  dextrose 5%. 1000 milliLiter(s) (50 mL/Hr) IV Continuous <Continuous>  dextrose 50% Injectable 25 Gram(s) IV Push once  dextrose 50% Injectable 25 Gram(s) IV Push once  dextrose 50% Injectable 12.5 Gram(s) IV Push once  enoxaparin Injectable 40 milliGRAM(s) SubCutaneous every 12 hours  glucagon  Injectable 1 milliGRAM(s) IntraMuscular once  HYDROmorphone  Injectable 0.5 milliGRAM(s) IV Push once  influenza   Vaccine 0.5 milliLiter(s) IntraMuscular once  insulin glargine Injectable (LANTUS) 25 Unit(s) SubCutaneous every morning  insulin lispro (ADMELOG) corrective regimen sliding scale   SubCutaneous three times a day before meals  insulin lispro (ADMELOG) corrective regimen sliding scale   SubCutaneous at bedtime  insulin lispro Injectable (ADMELOG) 10 Unit(s) SubCutaneous before breakfast  insulin lispro Injectable (ADMELOG) 10 Unit(s) SubCutaneous before lunch  insulin lispro Injectable (ADMELOG) 10 Unit(s) SubCutaneous before dinner  metFORMIN 500 milliGRAM(s) Oral daily  pantoprazole    Tablet 40 milliGRAM(s) Oral before breakfast    MEDICATIONS  (PRN):  acetaminophen     Tablet .. 975 milliGRAM(s) Oral every 6 hours PRN Moderate Pain (4 - 6)  dextrose Oral Gel 15 Gram(s) Oral once PRN Blood Glucose LESS THAN 70 milliGRAM(s)/deciliter      Allergies    No Known Allergies    Intolerances        REVIEW OF SYSTEMS:  CONSTITUTIONAL: no changes  EYES: No eye pain, visual disturbances, or discharge  ENMT:  No difficulty hearing, No sinus or throat pain  NECK: No pain or stiffness  RESPIRATORY: No cough, wheezing, chills or hemoptysis; No shortness of breath  CARDIOVASCULAR: No chest pain, palpitations or leg swelling  GASTROINTESTINAL: No abdominal or epigastric pain. No nausea, vomiting, or hematemesis; No diarrhea or constipation. No melena or hematochezia.  GENITOURINARY: No dysuria, frequency, hematuria, or incontinence  NEUROLOGICAL: No headaches, memory loss, loss of strength, numbness, or tremors  SKIN: No itching, burning, rashes, or lesions   ENDOCRINE: No heat or cold intolerance; No hair loss  MUSCULOSKELETAL: No joint pain or swelling; No muscle, back, or extremity pain  PSYCHIATRIC: No depression, anxiety, mood swings, or difficulty sleeping  HEME/LYMPH: No easy bruising, or bleeding gums  ALLERY AND IMMUNOLOGIC: No hives or eczema    Vital Signs Last 24 Hrs  T(C): 36.9 (20 Oct 2024 17:04), Max: 36.9 (19 Oct 2024 23:22)  T(F): 98.5 (20 Oct 2024 17:04), Max: 98.5 (19 Oct 2024 23:22)  HR: 108 (20 Oct 2024 17:16) (76 - 111)  BP: 137/83 (20 Oct 2024 17:16) (136/76 - 151/53)  BP(mean): --  RR: 18 (20 Oct 2024 17:04) (17 - 18)  SpO2: 97% (20 Oct 2024 17:04) (97% - 99%)    Parameters below as of 20 Oct 2024 17:04  Patient On (Oxygen Delivery Method): room air        PHYSICAL EXAM:  GENERAL:   HEAD: Atraumatic, Normocephalic  EYES: PERRLA, conjunctiva and sclera clear  ENMT: No  exudates,; Moist mucous membranes,, No lesions  NECK: Supple, No JVD, Normal thyroid  NERVOUS SYSTEM:  Alert & Oriented,   CHEST/LUNG: Clear to auscultation bilaterally; No rales, rhonchi, wheezing, or rubs  HEART: Regular rate and rhythm; No murmurs, rubs, or gallops  ABDOMEN: Soft, Nontender, Nondistended; Bowel sounds present  EXTREMITIES:  2+ Peripheral Pulses, no edema  SKIN: No rashes or lesions    LABS:        CAPILLARY BLOOD GLUCOSE      POCT Blood Glucose.: 131 mg/dL (20 Oct 2024 16:31)  POCT Blood Glucose.: 131 mg/dL (20 Oct 2024 11:25)  POCT Blood Glucose.: 143 mg/dL (20 Oct 2024 07:45)  POCT Blood Glucose.: 130 mg/dL (19 Oct 2024 21:17)    Lipid panel:           Thyroid:  Diabetes Tests:  Parathyroid Panel:  Adrenals:  RADIOLOGY & ADDITIONAL TESTS:    Imaging Personally Reviewed:  [ ] YES  [ ] NO    Consultant(s) Notes Reviewed:  [ ] YES  [ ] NO    Care Discussed with Consultants/Other Providers [ ] YES  [ ] NO

## 2024-10-21 LAB
ANION GAP SERPL CALC-SCNC: 6 MMOL/L — SIGNIFICANT CHANGE UP (ref 5–17)
BUN SERPL-MCNC: 9 MG/DL — SIGNIFICANT CHANGE UP (ref 7–23)
CALCIUM SERPL-MCNC: 9.1 MG/DL — SIGNIFICANT CHANGE UP (ref 8.5–10.1)
CHLORIDE SERPL-SCNC: 109 MMOL/L — HIGH (ref 96–108)
CO2 SERPL-SCNC: 26 MMOL/L — SIGNIFICANT CHANGE UP (ref 22–31)
CREAT SERPL-MCNC: 0.87 MG/DL — SIGNIFICANT CHANGE UP (ref 0.5–1.3)
EGFR: 122 ML/MIN/1.73M2 — SIGNIFICANT CHANGE UP
GLUCOSE BLDC GLUCOMTR-MCNC: 129 MG/DL — HIGH (ref 70–99)
GLUCOSE BLDC GLUCOMTR-MCNC: 149 MG/DL — HIGH (ref 70–99)
GLUCOSE BLDC GLUCOMTR-MCNC: 155 MG/DL — HIGH (ref 70–99)
GLUCOSE BLDC GLUCOMTR-MCNC: 191 MG/DL — HIGH (ref 70–99)
GLUCOSE BLDC GLUCOMTR-MCNC: 211 MG/DL — HIGH (ref 70–99)
GLUCOSE SERPL-MCNC: 161 MG/DL — HIGH (ref 70–99)
HCT VFR BLD CALC: 40 % — SIGNIFICANT CHANGE UP (ref 39–50)
HGB BLD-MCNC: 13.4 G/DL — SIGNIFICANT CHANGE UP (ref 13–17)
MCHC RBC-ENTMCNC: 28.4 PG — SIGNIFICANT CHANGE UP (ref 27–34)
MCHC RBC-ENTMCNC: 33.5 G/DL — SIGNIFICANT CHANGE UP (ref 32–36)
MCV RBC AUTO: 84.7 FL — SIGNIFICANT CHANGE UP (ref 80–100)
NRBC # BLD: 0 /100 WBCS — SIGNIFICANT CHANGE UP (ref 0–0)
PLATELET # BLD AUTO: 322 K/UL — SIGNIFICANT CHANGE UP (ref 150–400)
POTASSIUM SERPL-MCNC: 3.8 MMOL/L — SIGNIFICANT CHANGE UP (ref 3.5–5.3)
POTASSIUM SERPL-SCNC: 3.8 MMOL/L — SIGNIFICANT CHANGE UP (ref 3.5–5.3)
RBC # BLD: 4.72 M/UL — SIGNIFICANT CHANGE UP (ref 4.2–5.8)
RBC # FLD: 12.6 % — SIGNIFICANT CHANGE UP (ref 10.3–14.5)
SODIUM SERPL-SCNC: 141 MMOL/L — SIGNIFICANT CHANGE UP (ref 135–145)
WBC # BLD: 13.05 K/UL — HIGH (ref 3.8–10.5)
WBC # FLD AUTO: 13.05 K/UL — HIGH (ref 3.8–10.5)
ZINC TRANSPORTER 8 AB, RESULT: 99 U/ML — HIGH

## 2024-10-21 PROCEDURE — 99232 SBSQ HOSP IP/OBS MODERATE 35: CPT

## 2024-10-21 RX ADMIN — Medication 10 UNIT(S): at 09:01

## 2024-10-21 RX ADMIN — NYSTATIN 1 APPLICATION(S): 100000 POWDER TOPICAL at 17:55

## 2024-10-21 RX ADMIN — METFORMIN HYDROCHLORIDE 500 MILLIGRAM(S): 500 TABLET, EXTENDED RELEASE ORAL at 12:32

## 2024-10-21 RX ADMIN — NYSTATIN 1 APPLICATION(S): 100000 POWDER TOPICAL at 06:48

## 2024-10-21 RX ADMIN — Medication 10 UNIT(S): at 16:46

## 2024-10-21 RX ADMIN — PANTOPRAZOLE SODIUM 40 MILLIGRAM(S): 40 TABLET, DELAYED RELEASE ORAL at 09:02

## 2024-10-21 RX ADMIN — Medication 10 UNIT(S): at 12:33

## 2024-10-21 RX ADMIN — Medication 40 MILLIGRAM(S): at 17:55

## 2024-10-21 RX ADMIN — Medication 5 MILLIGRAM(S): at 06:48

## 2024-10-21 RX ADMIN — Medication 1: at 12:32

## 2024-10-21 RX ADMIN — Medication 40 MILLIGRAM(S): at 06:48

## 2024-10-21 RX ADMIN — Medication 25 UNIT(S): at 09:02

## 2024-10-21 RX ADMIN — Medication 1 APPLICATION(S): at 12:36

## 2024-10-21 NOTE — PROGRESS NOTE ADULT - SUBJECTIVE AND OBJECTIVE BOX
PROGRESS NOTE:     Patient is a 24y old  Male who presents with a chief complaint of Sepsis secondary to scrotal cellulitis vs. epididymitis (19 Oct 2024 10:25)          SUBJECTIVE & OBJECTIVE:   Pt seen and examined at bedside in AM    no overnight events.       REVIEW OF SYSTEMS: remaining ROS negative     PHYSICAL EXAM:  VITALS:  Vital Signs Last 24 Hrs  T(C): 36.8 (21 Oct 2024 11:12), Max: 36.9 (20 Oct 2024 17:04)  T(F): 98.2 (21 Oct 2024 11:12), Max: 98.5 (20 Oct 2024 17:04)  HR: 99 (21 Oct 2024 11:12) (82 - 111)  BP: 141/84 (21 Oct 2024 11:12) (137/83 - 159/96)  BP(mean): --  RR: 17 (21 Oct 2024 11:12) (17 - 18)  SpO2: 97% (21 Oct 2024 11:12) (95% - 98%)    Parameters below as of 21 Oct 2024 11:12  Patient On (Oxygen Delivery Method): room air          GENERAL: NAD,  no increased WOB  HEAD:  Atraumatic, Normocephalic  EYES: EOMI, conjunctiva and sclera clear  ENMT: Moist mucous membranes  NECK: Supple, No JVD  NERVOUS SYSTEM:  Alert & Oriented   CHEST/LUNG: Clear to auscultation bilaterally; No rales, rhonchi, wheezing  HEART: Regular rate and rhythm  ABDOMEN: Soft, Nontender, Nondistended; Bowel sounds present  : scrotal dressing in place, saturated  EXTREMITIES:  No clubbing, cyanosis, calf tenderness or edema b/l      MEDICATIONS  (STANDING):  amLODIPine   Tablet 5 milliGRAM(s) Oral daily  chlorhexidine 2% Cloths 1 Application(s) Topical <User Schedule>  collagenase Ointment 1 Application(s) Topical daily  dextrose 5%. 1000 milliLiter(s) (50 mL/Hr) IV Continuous <Continuous>  dextrose 5%. 1000 milliLiter(s) (100 mL/Hr) IV Continuous <Continuous>  dextrose 50% Injectable 25 Gram(s) IV Push once  dextrose 50% Injectable 25 Gram(s) IV Push once  dextrose 50% Injectable 12.5 Gram(s) IV Push once  enoxaparin Injectable 40 milliGRAM(s) SubCutaneous every 12 hours  glucagon  Injectable 1 milliGRAM(s) IntraMuscular once  HYDROmorphone  Injectable 0.5 milliGRAM(s) IV Push once  influenza   Vaccine 0.5 milliLiter(s) IntraMuscular once  insulin glargine Injectable (LANTUS) 25 Unit(s) SubCutaneous every morning  insulin lispro (ADMELOG) corrective regimen sliding scale   SubCutaneous three times a day before meals  insulin lispro (ADMELOG) corrective regimen sliding scale   SubCutaneous at bedtime  insulin lispro Injectable (ADMELOG) 10 Unit(s) SubCutaneous before breakfast  insulin lispro Injectable (ADMELOG) 10 Unit(s) SubCutaneous before lunch  insulin lispro Injectable (ADMELOG) 10 Unit(s) SubCutaneous before dinner  metFORMIN 500 milliGRAM(s) Oral daily  nystatin Cream 1 Application(s) Topical two times a day  pantoprazole    Tablet 40 milliGRAM(s) Oral before breakfast    MEDICATIONS  (PRN):  acetaminophen     Tablet .. 975 milliGRAM(s) Oral every 6 hours PRN Moderate Pain (4 - 6)  dextrose Oral Gel 15 Gram(s) Oral once PRN Blood Glucose LESS THAN 70 milliGRAM(s)/deciliter      Allergies    No Known Allergies    Intolerances              LABS:                           13.4   13.05 )-----------( 322      ( 21 Oct 2024 07:50 )             40.0     10-21    141  |  109[H]  |  9   ----------------------------<  161[H]  3.8   |  26  |  0.87    Ca    9.1      21 Oct 2024 07:50          Urinalysis Basic - ( 21 Oct 2024 07:50 )    Color: x / Appearance: x / SG: x / pH: x  Gluc: 161 mg/dL / Ketone: x  / Bili: x / Urobili: x   Blood: x / Protein: x / Nitrite: x   Leuk Esterase: x / RBC: x / WBC x   Sq Epi: x / Non Sq Epi: x / Bacteria: x                CAPILLARY BLOOD GLUCOSE      POCT Blood Glucose.: 191 mg/dL (21 Oct 2024 11:41)  POCT Blood Glucose.: 155 mg/dL (21 Oct 2024 08:59)  POCT Blood Glucose.: 149 mg/dL (21 Oct 2024 07:51)  POCT Blood Glucose.: 163 mg/dL (20 Oct 2024 21:26)  POCT Blood Glucose.: 131 mg/dL (20 Oct 2024 16:31)              RECENT CULTURES:          RADIOLOGY & ADDITIONAL TESTS:    < from: CT Abdomen and Pelvis w/ IV Cont (10.06.24 @ 15:18) >  IMPRESSION:  Diffuse edema involving the scrotal skin with mild perineal extension. No   focal gas collections    < end of copied text >    Care plan and all findings were discussed in detail with patient.  All questions and concerns addressed

## 2024-10-21 NOTE — PROGRESS NOTE ADULT - SUBJECTIVE AND OBJECTIVE BOX
CHA MCCAIN JR  MRN-[id number]  24y (2000)    Follow Up:  paul's gangrene    Interval History: ID team was called to re-evaluate the pt. The pt was seen and examined earlier, not in acute distress, complains of pruritic sensation in b/l groins, complained to the attending about mild pain in the left groin.  Pt is afebrile, RA, WBC elevated 13.05.     PAST MEDICAL & SURGICAL HISTORY:  No pertinent past medical history          ROS:    [ ] Unobtainable because:  [x ] All other systems negative    Constitutional: no fever, no chills  Head: no trauma  Eyes: no vision changes, no eye pain  ENT:  no sore throat, no rhinorrhea  Cardiovascular:  no chest pain, no palpitation  Respiratory:  no SOB, no cough  GI:  no abd pain, no vomiting, no diarrhea  urinary: no dysuria, no hematuria, no flank pain, mild left groin pain, scrotal wound with moderate amount of drainage  musculoskeletal:  no joint pain, no joint swelling  skin:  b/l groins pruritus   neurology:  no headache, no seizure, no change in mental status  psych: no anxiety, no depression         Allergies  No Known Allergies        ANTIMICROBIALS:      OTHER MEDS:  acetaminophen     Tablet .. 975 milliGRAM(s) Oral every 6 hours PRN  amLODIPine   Tablet 5 milliGRAM(s) Oral daily  chlorhexidine 2% Cloths 1 Application(s) Topical <User Schedule>  collagenase Ointment 1 Application(s) Topical daily  dextrose 5%. 1000 milliLiter(s) IV Continuous <Continuous>  dextrose 5%. 1000 milliLiter(s) IV Continuous <Continuous>  dextrose 50% Injectable 25 Gram(s) IV Push once  dextrose 50% Injectable 25 Gram(s) IV Push once  dextrose 50% Injectable 12.5 Gram(s) IV Push once  dextrose Oral Gel 15 Gram(s) Oral once PRN  enoxaparin Injectable 40 milliGRAM(s) SubCutaneous every 12 hours  glucagon  Injectable 1 milliGRAM(s) IntraMuscular once  HYDROmorphone  Injectable 0.5 milliGRAM(s) IV Push once  influenza   Vaccine 0.5 milliLiter(s) IntraMuscular once  insulin glargine Injectable (LANTUS) 25 Unit(s) SubCutaneous every morning  insulin lispro (ADMELOG) corrective regimen sliding scale   SubCutaneous three times a day before meals  insulin lispro (ADMELOG) corrective regimen sliding scale   SubCutaneous at bedtime  insulin lispro Injectable (ADMELOG) 10 Unit(s) SubCutaneous before lunch  insulin lispro Injectable (ADMELOG) 10 Unit(s) SubCutaneous before dinner  insulin lispro Injectable (ADMELOG) 10 Unit(s) SubCutaneous before breakfast  metFORMIN 500 milliGRAM(s) Oral daily  nystatin Cream 1 Application(s) Topical two times a day  pantoprazole    Tablet 40 milliGRAM(s) Oral before breakfast      Vital Signs Last 24 Hrs  T(C): 36.8 (21 Oct 2024 11:12), Max: 36.9 (20 Oct 2024 17:04)  T(F): 98.2 (21 Oct 2024 11:12), Max: 98.5 (20 Oct 2024 17:04)  HR: 99 (21 Oct 2024 11:12) (82 - 111)  BP: 141/84 (21 Oct 2024 11:12) (137/83 - 159/96)  BP(mean): --  RR: 17 (21 Oct 2024 11:12) (17 - 18)  SpO2: 97% (21 Oct 2024 11:12) (95% - 98%)    Parameters below as of 21 Oct 2024 11:12  Patient On (Oxygen Delivery Method): room air        Physical Exam:  Constitutional: non-toxic, no distress, RA  HEAD/EYES: anicteric, no conjunctival injection  ENT:  supple, no thrush  Cardiovascular:   normal S1, S2, no murmur, no edema  Respiratory:  clear BS bilaterally, no wheezes, no rales  GI:  soft, non-tender, normal bowel sounds  :  no dewitt, no CVA tenderness, scrotal area is dressed c/d/i, no b/l groin tenderness on the exam  Musculoskeletal:  no synovitis, normal ROM  Neurologic: awake and alert, normal strength, no focal findings  Skin:  mild intertrigo in b/l groins,  no phlebitis, pt has no piv   Heme/Onc: no lymphadenopathy   Psychiatric:  awake, alert, appropriate mood    WBC Count: 13.05 K/uL (10-21 @ 07:50)  WBC Count: 9.33 K/uL (10-15 @ 05:35)                            13.4   13.05 )-----------( 322      ( 21 Oct 2024 07:50 )             40.0       10-21    141  |  109[H]  |  9   ----------------------------<  161[H]  3.8   |  26  |  0.87    Ca    9.1      21 Oct 2024 07:50        Urinalysis Basic - ( 21 Oct 2024 07:50 )    Color: x / Appearance: x / SG: x / pH: x  Gluc: 161 mg/dL / Ketone: x  / Bili: x / Urobili: x   Blood: x / Protein: x / Nitrite: x   Leuk Esterase: x / RBC: x / WBC x   Sq Epi: x / Non Sq Epi: x / Bacteria: x        Creatinine Trend: 0.87<--, 0.99<--, 0.92<--, 0.84<--, 0.80<--, 1.00<--      MICROBIOLOGY:  v  Clean Catch Clean Catch (Midstream)  10-10-24   No growth  --  --      .Surgical Swab  10-07-24   Moderate Prevotella disiens "Susceptibilities not performed"  Moderate Prevotella timonensis "Susceptibilities not performed"  --  --      Clean Catch Clean Catch (Midstream)  10-06-24   <10,000 CFU/mL Normal Urogenital Henny  --  --      .Blood BLOOD  10-06-24   No growth at 5 days  --  --      .Blood BLOOD  10-06-24   No growth at 5 days  --  --      Clean Catch Clean Catch (Midstream)  10-04-24   <10,000 CFU/mL Normal Urogenital Henny  --  --        HIV-1 RNA Quantitative, Viral Load Log: NOT DET. lg /mL (10-09-24 @ 03:00)          C-Reactive Protein: 231 (10-09)      RADIOLOGY:

## 2024-10-21 NOTE — PROGRESS NOTE ADULT - SUBJECTIVE AND OBJECTIVE BOX
Patient is a 24y old  Male who presents with a chief complaint of Sepsis secondary to scrotal cellulitis vs. epididymitis (21 Oct 2024 16:41)      Interval History: finger sticks are in 100s with occasional increase to > 200   on Lantus 25 units and prandial lispro 10 units and Metformin 500 mg QD    MEDICATIONS  (STANDING):  amLODIPine   Tablet 5 milliGRAM(s) Oral daily  chlorhexidine 2% Cloths 1 Application(s) Topical <User Schedule>  collagenase Ointment 1 Application(s) Topical daily  dextrose 5%. 1000 milliLiter(s) (50 mL/Hr) IV Continuous <Continuous>  dextrose 5%. 1000 milliLiter(s) (100 mL/Hr) IV Continuous <Continuous>  dextrose 50% Injectable 25 Gram(s) IV Push once  dextrose 50% Injectable 25 Gram(s) IV Push once  dextrose 50% Injectable 12.5 Gram(s) IV Push once  enoxaparin Injectable 40 milliGRAM(s) SubCutaneous every 12 hours  glucagon  Injectable 1 milliGRAM(s) IntraMuscular once  HYDROmorphone  Injectable 0.5 milliGRAM(s) IV Push once  influenza   Vaccine 0.5 milliLiter(s) IntraMuscular once  insulin glargine Injectable (LANTUS) 25 Unit(s) SubCutaneous every morning  insulin lispro (ADMELOG) corrective regimen sliding scale   SubCutaneous three times a day before meals  insulin lispro (ADMELOG) corrective regimen sliding scale   SubCutaneous at bedtime  insulin lispro Injectable (ADMELOG) 10 Unit(s) SubCutaneous before breakfast  insulin lispro Injectable (ADMELOG) 10 Unit(s) SubCutaneous before lunch  insulin lispro Injectable (ADMELOG) 10 Unit(s) SubCutaneous before dinner  metFORMIN 500 milliGRAM(s) Oral daily  nystatin Cream 1 Application(s) Topical two times a day  pantoprazole    Tablet 40 milliGRAM(s) Oral before breakfast    MEDICATIONS  (PRN):  acetaminophen     Tablet .. 975 milliGRAM(s) Oral every 6 hours PRN Moderate Pain (4 - 6)  dextrose Oral Gel 15 Gram(s) Oral once PRN Blood Glucose LESS THAN 70 milliGRAM(s)/deciliter      Allergies    No Known Allergies    Intolerances        REVIEW OF SYSTEMS:  CONSTITUTIONAL: no changes  EYES: No eye pain, visual disturbances, or discharge  ENMT:  No difficulty hearing, No sinus or throat pain  NECK: No pain or stiffness  RESPIRATORY: No cough, wheezing, chills or hemoptysis; No shortness of breath  CARDIOVASCULAR: No chest pain, palpitations or leg swelling  GASTROINTESTINAL: No abdominal or epigastric pain. No nausea, vomiting, or hematemesis; No diarrhea or constipation. No melena or hematochezia.  GENITOURINARY: No dysuria, frequency, hematuria, or incontinence  NEUROLOGICAL: No headaches, memory loss, loss of strength, numbness, or tremors  SKIN: No itching, burning, rashes, or lesions   ENDOCRINE: No heat or cold intolerance; No hair loss  MUSCULOSKELETAL: No joint pain or swelling; No muscle, back, or extremity pain  PSYCHIATRIC: No depression, anxiety, mood swings, or difficulty sleeping  HEME/LYMPH: No easy bruising, or bleeding gums  ALLERY AND IMMUNOLOGIC: No hives or eczema    Vital Signs Last 24 Hrs  T(C): 36.7 (21 Oct 2024 16:57), Max: 36.8 (21 Oct 2024 00:00)  T(F): 98.1 (21 Oct 2024 16:57), Max: 98.3 (21 Oct 2024 00:00)  HR: 100 (21 Oct 2024 16:57) (82 - 100)  BP: 161/91 (21 Oct 2024 16:57) (141/84 - 161/91)  BP(mean): --  RR: 18 (21 Oct 2024 16:57) (17 - 18)  SpO2: 98% (21 Oct 2024 16:57) (95% - 98%)    Parameters below as of 21 Oct 2024 16:57  Patient On (Oxygen Delivery Method): room air        PHYSICAL EXAM:  GENERAL:   HEAD: Atraumatic, Normocephalic  EYES: PERRLA, conjunctiva and sclera clear  ENMT: No  exudates,; Moist mucous membranes,, No lesions  NECK: Supple, No JVD, Normal thyroid  NERVOUS SYSTEM:  Alert & Oriented,   CHEST/LUNG: Clear to auscultation bilaterally; No rales, rhonchi, wheezing, or rubs  HEART: Regular rate and rhythm; No murmurs, rubs, or gallops  ABDOMEN: Soft, Nontender, Nondistended; Bowel sounds present  EXTREMITIES:  2+ Peripheral Pulses, no edema  SKIN: No rashes or lesions    LABS:      Urinalysis Basic - ( 21 Oct 2024 07:50 )    Color: x / Appearance: x / SG: x / pH: x  Gluc: 161 mg/dL / Ketone: x  / Bili: x / Urobili: x   Blood: x / Protein: x / Nitrite: x   Leuk Esterase: x / RBC: x / WBC x   Sq Epi: x / Non Sq Epi: x / Bacteria: x      CAPILLARY BLOOD GLUCOSE      POCT Blood Glucose.: 211 mg/dL (21 Oct 2024 21:45)  POCT Blood Glucose.: 129 mg/dL (21 Oct 2024 16:01)  POCT Blood Glucose.: 191 mg/dL (21 Oct 2024 11:41)  POCT Blood Glucose.: 155 mg/dL (21 Oct 2024 08:59)  POCT Blood Glucose.: 149 mg/dL (21 Oct 2024 07:51)    Lipid panel:           Thyroid:  Diabetes Tests:  Parathyroid Panel:  Adrenals:  RADIOLOGY & ADDITIONAL TESTS:    Imaging Personally Reviewed:  [ ] YES  [ ] NO    Consultant(s) Notes Reviewed:  [ ] YES  [ ] NO    Care Discussed with Consultants/Other Providers [ ] YES  [ ] NO

## 2024-10-21 NOTE — CONSULT NOTE ADULT - REASON FOR ADMISSION
Sepsis secondary to scrotal cellulitis vs. epididymitis

## 2024-10-21 NOTE — PROGRESS NOTE ADULT - ASSESSMENT
24M with Paul's gangrene now POD#1 S/P scrotal exploration, excision/debridement of paul's gangrene  see brief op report which showed:  purulent drainage upon entering scrotum, devitalized tissue excised, slough debrided from b/l scrotum  had fever leukocytosis, tachycardia     10/10: downgraded to the medical floor, no fevers since 10/8, RA, WBC normalized, Cr ok, BCs, UC and surgical culture NGTD, Vancomycin and clindamycin were stopped, Zosyn IV and Linezolid continued.   10/11: no fever, RA,  no new cbc, Cr ok surgical cx and BCs NGTD, the wound is being monitored by urology / surgery, possibly will need further debridement next week, pending hospital course. Linezolid and Zosyn IV continued.   10/14: doing well, no fevers, RA, no wbc, Cr ok, surgical cultures growing Prevotella, today is day # 7 post op, will stop abx after today's doses.    10/15: no fever, RA, no wbc, BCs NGTD, surgical culture is growing Prevotella, pt is being monitored off abx.   10/21: afebrile, RA, WBC elevated 13.05, Cr ok, prior BCs and UC NgTD, surgical culture grew Prevotella, pt completed course of abx, will continue to monitor off abx.      #Paul's gangrene   #Cellulitis   #Sepsis  #newly Diabetes mellitus type 2  #STD screening     Plan:  course of abx is complete, pt is being monitored off abx   local wound care, daily dressing change per nursing   DVT prophylaxis, Incentive Spirometer, OOB, pain control  trend wbc count   hyperbaric oxygen therapy per vascular / wound care team   HIV screen is negative   Gc/chlamydia - negative   syphilis screen negative   newly diagnosed DM2-needs good insulin regimen, endocrinology consult is appreciated   recognize he is insulin naive and could be very sensitive to insulin doses and need to be mindful of doses to avoid hypoglycemic episodes       discussed with Dr. Briones  discussed with Dr. Fischer  discussed with JAY

## 2024-10-21 NOTE — CONSULT NOTE ADULT - CONSULT REQUESTED DATE/TIME
21-Oct-2024
05-Oct-2024 19:22
06-Oct-2024 16:07
07-Oct-2024 17:45
09-Oct-2024 12:15
09-Oct-2024 09:03

## 2024-10-21 NOTE — CONSULT NOTE ADULT - SUBJECTIVE AND OBJECTIVE BOX
Patient is a 24y old  Male who presents with a chief complaint of Sepsis secondary to scrotal cellulitis vs. epididymitis (21 Oct 2024 14:37)      HPI:  Jose Low Jr. is a 24 year old male with no significant PMHx who presented to the ED on 10/6/24 for complaints of left testicular pain.    Patient reports he has been having left testicular pain since Thursday. Described as a burning sensation and severe pain to the point where he is unable to eat or sleep. Severity was 10/10. Did not take any analgesics for the pain. No trauma to area. Not currently sexually active and has never been. Came to the ER on 10/4/24 for further evaluation. WBC 17.47K at that time. U/A with trace ketones, proteinuria, negative leuks, negative nitrites, WBC 0, RBC 1, occasional bacteria, glucosuria. U/S scrotum and testicles without evidence of testicular torsion but with marked left scrotal wall thickening, edema, and mild hyperemia of left epididymal body and tail. Received ceftriaxone 500 mg IV x 1. Discharged home with prescription for doxycycline 100 mg BID. Spiking fevers with Tmax 101 and severe pain despite taking doxycycline and tylenol which prompted another visit to ER.     In the ED, Tmax 101.9, HR as elevated as 132, BP as elevated as 153/98. WBC 18.92K, blood glucose 340. U/A with ketonuria, proteinuria, negative nitrites, negative leuks, small blood, WBC 3, RBC 5, few bacteria, glucosuria. CT A/P with diffuse edema involving the scrotal skin with mild perineal extension. No focal gas collections. Received LR 2500 cc bolus, vancomycin 1500 mg IV, zosyn 3.375 g IV, clindamycin 900 mg IV, acetaminophen 975 mg PO, and morphine 4 mg IV. Evaluated by urology who recommended admission for IV antibiotics and does not recommend acute urologic intervention. (06 Oct 2024 20:16)    Asked by primary team to reevaluate patient who remains in house, receiving local wound care. Pt without new symptoms. Denies fever, chills, n/v. Voiding without issue.       PAST MEDICAL & SURGICAL HISTORY:  No pertinent past medical history      Review of Systems:  Negative except  as above in HPI    MEDICATIONS  (STANDING):  amLODIPine   Tablet 5 milliGRAM(s) Oral daily  chlorhexidine 2% Cloths 1 Application(s) Topical <User Schedule>  collagenase Ointment 1 Application(s) Topical daily  dextrose 5%. 1000 milliLiter(s) (50 mL/Hr) IV Continuous <Continuous>  dextrose 5%. 1000 milliLiter(s) (100 mL/Hr) IV Continuous <Continuous>  dextrose 50% Injectable 25 Gram(s) IV Push once  dextrose 50% Injectable 25 Gram(s) IV Push once  dextrose 50% Injectable 12.5 Gram(s) IV Push once  enoxaparin Injectable 40 milliGRAM(s) SubCutaneous every 12 hours  glucagon  Injectable 1 milliGRAM(s) IntraMuscular once  HYDROmorphone  Injectable 0.5 milliGRAM(s) IV Push once  influenza   Vaccine 0.5 milliLiter(s) IntraMuscular once  insulin glargine Injectable (LANTUS) 25 Unit(s) SubCutaneous every morning  insulin lispro (ADMELOG) corrective regimen sliding scale   SubCutaneous three times a day before meals  insulin lispro (ADMELOG) corrective regimen sliding scale   SubCutaneous at bedtime  insulin lispro Injectable (ADMELOG) 10 Unit(s) SubCutaneous before lunch  insulin lispro Injectable (ADMELOG) 10 Unit(s) SubCutaneous before dinner  insulin lispro Injectable (ADMELOG) 10 Unit(s) SubCutaneous before breakfast  metFORMIN 500 milliGRAM(s) Oral daily  nystatin Cream 1 Application(s) Topical two times a day  pantoprazole    Tablet 40 milliGRAM(s) Oral before breakfast    MEDICATIONS  (PRN):  acetaminophen     Tablet .. 975 milliGRAM(s) Oral every 6 hours PRN Moderate Pain (4 - 6)  dextrose Oral Gel 15 Gram(s) Oral once PRN Blood Glucose LESS THAN 70 milliGRAM(s)/deciliter      Allergies  No Known Allergies    SOCIAL HISTORY nonsmoker no etoh use            Vital Signs Last 24 Hrs  T(C): 36.8 (21 Oct 2024 11:12), Max: 36.9 (20 Oct 2024 17:04)  T(F): 98.2 (21 Oct 2024 11:12), Max: 98.5 (20 Oct 2024 17:04)  HR: 99 (21 Oct 2024 11:12) (82 - 111)  BP: 141/84 (21 Oct 2024 11:12) (137/83 - 159/96)  BP(mean): --  RR: 17 (21 Oct 2024 11:12) (17 - 18)  SpO2: 97% (21 Oct 2024 11:12) (95% - 98%)    Parameters below as of 21 Oct 2024 11:12  Patient On (Oxygen Delivery Method): room air    PHYSICAL EXAM:  General: NAD, alert and awake, well-appearing  HEENT: NCAT, EOMI, conjunctiva clear  Chest: nonlabored respirations, good inspiratory effort  Abdomen: soft, NTND.   Extremities: no pedal edema or calf tenderness noted   : uncircumcised phallus, adequate meatus. Scrotal skin absent anteriorly, post op debridement site, granulating well with smaller exposed area, healing well.   Mild adherent fibrinous exudate noted at inferior aspect, no purulence or active drainage. No tunneling or undermining. Minimal slough was removed at bedside. Washed with saline and dry dressing applied.     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 epididymitis (21 Oct 2024 14:37)      HPI:  Jose Low Jr. is a 24 year old male with no significant PMHx who presented to the ED on 10/6/24 for complaints of left testicular pain.    Patient reports he has been having left testicular pain since Thursday. Described as a burning sensation and severe pain to the point where he is unable to eat or sleep. Severity was 10/10. Did not take any analgesics for the pain. No trauma to area. Not currently sexually active and has never been. Came to the ER on 10/4/24 for further evaluation. WBC 17.47K at that time. U/A with trace ketones, proteinuria, negative leuks, negative nitrites, WBC 0, RBC 1, occasional bacteria, glucosuria. U/S scrotum and testicles without evidence of testicular torsion but with marked left scrotal wall thickening, edema, and mild hyperemia of left epididymal body and tail. Received ceftriaxone 500 mg IV x 1. Discharged home with prescription for doxycycline 100 mg BID. Spiking fevers with Tmax 101 and severe pain despite taking doxycycline and tylenol which prompted another visit to ER.     In the ED, Tmax 101.9, HR as elevated as 132, BP as elevated as 153/98. WBC 18.92K, blood glucose 340. U/A with ketonuria, proteinuria, negative nitrites, negative leuks, small blood, WBC 3, RBC 5, few bacteria, glucosuria. CT A/P with diffuse edema involving the scrotal skin with mild perineal extension. No focal gas collections. Received LR 2500 cc bolus, vancomycin 1500 mg IV, zosyn 3.375 g IV, clindamycin 900 mg IV, acetaminophen 975 mg PO, and morphine 4 mg IV. Evaluated by urology who recommended admission for IV antibiotics and does not recommend acute urologic intervention. (06 Oct 2024 20:16)    Asked by primary team to reevaluate patient who remains in house, receiving local wound care and hyperbaric therapy (last session was 10/18). Pt without new symptoms. Denies fever, chills, n/v. Voiding without issue.       PAST MEDICAL & SURGICAL HISTORY:  No pertinent past medical history      Review of Systems:  Negative except  as above in HPI    MEDICATIONS  (STANDING):  amLODIPine   Tablet 5 milliGRAM(s) Oral daily  chlorhexidine 2% Cloths 1 Application(s) Topical <User Schedule>  collagenase Ointment 1 Application(s) Topical daily  dextrose 5%. 1000 milliLiter(s) (50 mL/Hr) IV Continuous <Continuous>  dextrose 5%. 1000 milliLiter(s) (100 mL/Hr) IV Continuous <Continuous>  dextrose 50% Injectable 25 Gram(s) IV Push once  dextrose 50% Injectable 25 Gram(s) IV Push once  dextrose 50% Injectable 12.5 Gram(s) IV Push once  enoxaparin Injectable 40 milliGRAM(s) SubCutaneous every 12 hours  glucagon  Injectable 1 milliGRAM(s) IntraMuscular once  HYDROmorphone  Injectable 0.5 milliGRAM(s) IV Push once  influenza   Vaccine 0.5 milliLiter(s) IntraMuscular once  insulin glargine Injectable (LANTUS) 25 Unit(s) SubCutaneous every morning  insulin lispro (ADMELOG) corrective regimen sliding scale   SubCutaneous three times a day before meals  insulin lispro (ADMELOG) corrective regimen sliding scale   SubCutaneous at bedtime  insulin lispro Injectable (ADMELOG) 10 Unit(s) SubCutaneous before lunch  insulin lispro Injectable (ADMELOG) 10 Unit(s) SubCutaneous before dinner  insulin lispro Injectable (ADMELOG) 10 Unit(s) SubCutaneous before breakfast  metFORMIN 500 milliGRAM(s) Oral daily  nystatin Cream 1 Application(s) Topical two times a day  pantoprazole    Tablet 40 milliGRAM(s) Oral before breakfast    MEDICATIONS  (PRN):  acetaminophen     Tablet .. 975 milliGRAM(s) Oral every 6 hours PRN Moderate Pain (4 - 6)  dextrose Oral Gel 15 Gram(s) Oral once PRN Blood Glucose LESS THAN 70 milliGRAM(s)/deciliter      Allergies  No Known Allergies    SOCIAL HISTORY nonsmoker no etoh use            Vital Signs Last 24 Hrs  T(C): 36.8 (21 Oct 2024 11:12), Max: 36.9 (20 Oct 2024 17:04)  T(F): 98.2 (21 Oct 2024 11:12), Max: 98.5 (20 Oct 2024 17:04)  HR: 99 (21 Oct 2024 11:12) (82 - 111)  BP: 141/84 (21 Oct 2024 11:12) (137/83 - 159/96)  BP(mean): --  RR: 17 (21 Oct 2024 11:12) (17 - 18)  SpO2: 97% (21 Oct 2024 11:12) (95% - 98%)    Parameters below as of 21 Oct 2024 11:12  Patient On (Oxygen Delivery Method): room air    PHYSICAL EXAM:  General: NAD, alert and awake, well-appearing  HEENT: NCAT, EOMI, conjunctiva clear  Chest: nonlabored respirations, good inspiratory effort  Abdomen: soft, NTND.   Extremities: no pedal edema or calf tenderness noted   : uncircumcised phallus, adequate meatus. Scrotal skin absent anteriorly, post op debridement site, granulating well with smaller exposed area, healing well.   Mild adherent fibrinous exudate noted at inferior aspect, no purulence or active drainage. No tunneling or undermining. Minimal slough was removed at bedside. Washed with saline and dry dressing applied.     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

## 2024-10-21 NOTE — PROGRESS NOTE ADULT - ASSESSMENT
24 year old male with no significant PMHx who presented to the ED on 10/6/24 for complaints of left testicular pain. Pt admitted to medicine service for sepsis likely from a scrotal abscess. Urology following and decision was made to take pt to OR for I&D scrotal abscess. 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 Minimal EBL, given 1L IVF. No pressors administered. Pt extubated post-op with no complications.    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 No more sessions needed Per Dr. Vera.   - C/w daily wound dressing changes   - urology input appreciated    #ETHAN   - Satting adequately on supplemental oxygen, maintain sats>92%   - Will need Outpatient sleep study    #Type 2 DM with hyperglycemia:  - Newly diagnosed, Hgb A1c 13  - C/w Lantus 25 U, Pre meal 10 U TID   - DM education   - Endo following-> d/c on current regimen    #HTN  - C/w Amlodipine 5 mg QD  - monitor BP    Diet: CCD   DVT prophylaxis: Lovenox   Dispo: Pending insurance authorization

## 2024-10-22 VITALS
RESPIRATION RATE: 18 BRPM | OXYGEN SATURATION: 97 % | HEART RATE: 91 BPM | SYSTOLIC BLOOD PRESSURE: 137 MMHG | TEMPERATURE: 98 F | DIASTOLIC BLOOD PRESSURE: 95 MMHG

## 2024-10-22 LAB
BASOPHILS # BLD AUTO: 0.09 K/UL — SIGNIFICANT CHANGE UP (ref 0–0.2)
BASOPHILS NFR BLD AUTO: 1.1 % — SIGNIFICANT CHANGE UP (ref 0–2)
EOSINOPHIL # BLD AUTO: 0.15 K/UL — SIGNIFICANT CHANGE UP (ref 0–0.5)
EOSINOPHIL NFR BLD AUTO: 1.9 % — SIGNIFICANT CHANGE UP (ref 0–6)
GLUCOSE BLDC GLUCOMTR-MCNC: 159 MG/DL — HIGH (ref 70–99)
GLUCOSE BLDC GLUCOMTR-MCNC: 168 MG/DL — HIGH (ref 70–99)
HCT VFR BLD CALC: 40.5 % — SIGNIFICANT CHANGE UP (ref 39–50)
HGB BLD-MCNC: 13.5 G/DL — SIGNIFICANT CHANGE UP (ref 13–17)
IMM GRANULOCYTES NFR BLD AUTO: 0.4 % — SIGNIFICANT CHANGE UP (ref 0–0.9)
LYMPHOCYTES # BLD AUTO: 3.27 K/UL — SIGNIFICANT CHANGE UP (ref 1–3.3)
LYMPHOCYTES # BLD AUTO: 41 % — SIGNIFICANT CHANGE UP (ref 13–44)
MCHC RBC-ENTMCNC: 28.6 PG — SIGNIFICANT CHANGE UP (ref 27–34)
MCHC RBC-ENTMCNC: 33.3 G/DL — SIGNIFICANT CHANGE UP (ref 32–36)
MCV RBC AUTO: 85.8 FL — SIGNIFICANT CHANGE UP (ref 80–100)
MONOCYTES # BLD AUTO: 0.67 K/UL — SIGNIFICANT CHANGE UP (ref 0–0.9)
MONOCYTES NFR BLD AUTO: 8.4 % — SIGNIFICANT CHANGE UP (ref 2–14)
NEUTROPHILS # BLD AUTO: 3.77 K/UL — SIGNIFICANT CHANGE UP (ref 1.8–7.4)
NEUTROPHILS NFR BLD AUTO: 47.2 % — SIGNIFICANT CHANGE UP (ref 43–77)
NRBC # BLD: 0 /100 WBCS — SIGNIFICANT CHANGE UP (ref 0–0)
PLATELET # BLD AUTO: 313 K/UL — SIGNIFICANT CHANGE UP (ref 150–400)
RBC # BLD: 4.72 M/UL — SIGNIFICANT CHANGE UP (ref 4.2–5.8)
RBC # FLD: 12.6 % — SIGNIFICANT CHANGE UP (ref 10.3–14.5)
WBC # BLD: 7.98 K/UL — SIGNIFICANT CHANGE UP (ref 3.8–10.5)
WBC # FLD AUTO: 7.98 K/UL — SIGNIFICANT CHANGE UP (ref 3.8–10.5)

## 2024-10-22 PROCEDURE — 99239 HOSP IP/OBS DSCHRG MGMT >30: CPT

## 2024-10-22 PROCEDURE — 99231 SBSQ HOSP IP/OBS SF/LOW 25: CPT

## 2024-10-22 RX ORDER — NYSTATIN 100000 U/G
1 POWDER TOPICAL
Qty: 0 | Refills: 0 | DISCHARGE
Start: 2024-10-22

## 2024-10-22 RX ORDER — AMLODIPINE BESYLATE 10 MG
1 TABLET ORAL
Qty: 0 | Refills: 0 | DISCHARGE
Start: 2024-10-22

## 2024-10-22 RX ORDER — INSULIN LISPRO 100/ML
10 VIAL (ML) SUBCUTANEOUS
Qty: 0 | Refills: 0 | DISCHARGE
Start: 2024-10-22

## 2024-10-22 RX ORDER — METFORMIN HYDROCHLORIDE 500 MG/1
1 TABLET, EXTENDED RELEASE ORAL
Qty: 0 | Refills: 0 | DISCHARGE
Start: 2024-10-22

## 2024-10-22 RX ORDER — INSULIN GLARGINE,HUM.REC.ANLOG 100/ML
25 VIAL (ML) SUBCUTANEOUS
Qty: 0 | Refills: 0 | DISCHARGE
Start: 2024-10-22

## 2024-10-22 RX ADMIN — Medication 1: at 12:18

## 2024-10-22 RX ADMIN — Medication 5 MILLIGRAM(S): at 05:16

## 2024-10-22 RX ADMIN — METFORMIN HYDROCHLORIDE 500 MILLIGRAM(S): 500 TABLET, EXTENDED RELEASE ORAL at 12:18

## 2024-10-22 RX ADMIN — Medication 40 MILLIGRAM(S): at 05:16

## 2024-10-22 RX ADMIN — Medication 1: at 08:43

## 2024-10-22 RX ADMIN — Medication 1 APPLICATION(S): at 12:22

## 2024-10-22 RX ADMIN — Medication 10 UNIT(S): at 12:18

## 2024-10-22 RX ADMIN — CHLORHEXIDINE GLUCONATE 1 APPLICATION(S): 40 SOLUTION TOPICAL at 05:20

## 2024-10-22 RX ADMIN — PANTOPRAZOLE SODIUM 40 MILLIGRAM(S): 40 TABLET, DELAYED RELEASE ORAL at 05:16

## 2024-10-22 RX ADMIN — Medication 25 UNIT(S): at 08:43

## 2024-10-22 RX ADMIN — Medication 10 UNIT(S): at 08:43

## 2024-10-22 RX ADMIN — NYSTATIN 1 APPLICATION(S): 100000 POWDER TOPICAL at 05:22

## 2024-10-22 NOTE — DISCHARGE NOTE NURSING/CASE MANAGEMENT/SOCIAL WORK - NSDCFUADDAPPT_GEN_ALL_CORE_FT
APPTS ARE READY TO BE MADE: [X] YES    Best Family or Patient Contact (if needed):    Additional Information about above appointments (if needed):    [facility]  Phone: [phone]    Other comments or requests:

## 2024-10-22 NOTE — DISCHARGE NOTE NURSING/CASE MANAGEMENT/SOCIAL WORK - FINANCIAL ASSISTANCE
[facility] provides services at a reduced cost to those who are determined to be eligible through [facility]’s financial assistance program. Information regarding [facility]’s financial assistance program can be found by going to [email or web address] or by calling [phone].

## 2024-10-22 NOTE — PROGRESS NOTE ADULT - REASON FOR ADMISSION
Sepsis secondary to scrotal cellulitis vs. epididymitis
Sepsis
Sepsis secondary to scrotal cellulitis vs. epididymitis

## 2024-10-22 NOTE — DISCHARGE NOTE NURSING/CASE MANAGEMENT/SOCIAL WORK - PATIENT PORTAL LINK FT
You can access the FollowMyHealth Patient Portal offered by [facility] by registering at the following website: [email or web address]. By joining [facility]’s FollowMyHealth portal, you will also be able to view your health information using other applications (apps) compatible with our system.

## 2024-10-22 NOTE — PROGRESS NOTE ADULT - SUBJECTIVE AND OBJECTIVE BOX
PROGRESS NOTE:     Patient is a 24y old  Male who presents with a chief complaint of Sepsis secondary to scrotal cellulitis vs. epididymitis (19 Oct 2024 10:25)          SUBJECTIVE & OBJECTIVE:   Pt seen and examined at bedside in AM    no overnight events.       REVIEW OF SYSTEMS: remaining ROS negative     PHYSICAL EXAM:  VITALS:  Vital Signs Last 24 Hrs  T(C): 36.5 (22 Oct 2024 11:02), Max: 36.7 (21 Oct 2024 16:57)  T(F): 97.7 (22 Oct 2024 11:02), Max: 98.1 (21 Oct 2024 16:57)  HR: 91 (22 Oct 2024 11:02) (80 - 100)  BP: 137/95 (22 Oct 2024 11:02) (128/78 - 161/91)  BP(mean): --  RR: 18 (22 Oct 2024 11:02) (17 - 18)  SpO2: 97% (22 Oct 2024 11:02) (96% - 98%)    Parameters below as of 22 Oct 2024 11:02  Patient On (Oxygen Delivery Method): room air          GENERAL: NAD,  no increased WOB  HEAD:  Atraumatic, Normocephalic  EYES: EOMI, conjunctiva and sclera clear  ENMT: Moist mucous membranes  NECK: Supple, No JVD  NERVOUS SYSTEM:  Alert & Oriented   CHEST/LUNG: Clear to auscultation bilaterally; No rales, rhonchi, wheezing  HEART: Regular rate and rhythm  ABDOMEN: Soft, Nontender, Nondistended; Bowel sounds present  : scrotal dressing in place  EXTREMITIES:  No clubbing, cyanosis, calf tenderness or edema b/l      MEDICATIONS  (STANDING):  amLODIPine   Tablet 5 milliGRAM(s) Oral daily  chlorhexidine 2% Cloths 1 Application(s) Topical <User Schedule>  collagenase Ointment 1 Application(s) Topical daily  dextrose 5%. 1000 milliLiter(s) (50 mL/Hr) IV Continuous <Continuous>  dextrose 5%. 1000 milliLiter(s) (100 mL/Hr) IV Continuous <Continuous>  dextrose 50% Injectable 25 Gram(s) IV Push once  dextrose 50% Injectable 25 Gram(s) IV Push once  dextrose 50% Injectable 12.5 Gram(s) IV Push once  enoxaparin Injectable 40 milliGRAM(s) SubCutaneous every 12 hours  glucagon  Injectable 1 milliGRAM(s) IntraMuscular once  HYDROmorphone  Injectable 0.5 milliGRAM(s) IV Push once  influenza   Vaccine 0.5 milliLiter(s) IntraMuscular once  insulin glargine Injectable (LANTUS) 25 Unit(s) SubCutaneous every morning  insulin lispro (ADMELOG) corrective regimen sliding scale   SubCutaneous at bedtime  insulin lispro (ADMELOG) corrective regimen sliding scale   SubCutaneous three times a day before meals  insulin lispro Injectable (ADMELOG) 10 Unit(s) SubCutaneous before breakfast  insulin lispro Injectable (ADMELOG) 10 Unit(s) SubCutaneous before lunch  insulin lispro Injectable (ADMELOG) 10 Unit(s) SubCutaneous before dinner  metFORMIN 500 milliGRAM(s) Oral daily  nystatin Cream 1 Application(s) Topical two times a day  pantoprazole    Tablet 40 milliGRAM(s) Oral before breakfast    MEDICATIONS  (PRN):  acetaminophen     Tablet .. 975 milliGRAM(s) Oral every 6 hours PRN Moderate Pain (4 - 6)  dextrose Oral Gel 15 Gram(s) Oral once PRN Blood Glucose LESS THAN 70 milliGRAM(s)/deciliter        Allergies    No Known Allergies    Intolerances              LABS:                                      13.5   7.98  )-----------( 313      ( 22 Oct 2024 06:28 )             40.5     10-21    141  |  109[H]  |  9   ----------------------------<  161[H]  3.8   |  26  |  0.87    Ca    9.1      21 Oct 2024 07:50          Urinalysis Basic - ( 21 Oct 2024 07:50 )    Color: x / Appearance: x / SG: x / pH: x  Gluc: 161 mg/dL / Ketone: x  / Bili: x / Urobili: x   Blood: x / Protein: x / Nitrite: x   Leuk Esterase: x / RBC: x / WBC x   Sq Epi: x / Non Sq Epi: x / Bacteria: x                CAPILLARY BLOOD GLUCOSE      POCT Blood Glucose.: 168 mg/dL (22 Oct 2024 11:19)  POCT Blood Glucose.: 159 mg/dL (22 Oct 2024 07:44)  POCT Blood Glucose.: 211 mg/dL (21 Oct 2024 21:45)  POCT Blood Glucose.: 129 mg/dL (21 Oct 2024 16:01)            RECENT CULTURES:          RADIOLOGY & ADDITIONAL TESTS:    < from: CT Abdomen and Pelvis w/ IV Cont (10.06.24 @ 15:18) >  IMPRESSION:  Diffuse edema involving the scrotal skin with mild perineal extension. No   focal gas collections    < end of copied text >    Care plan and all findings were discussed in detail with patient.  All questions and concerns addressed

## 2024-10-22 NOTE — PROGRESS NOTE ADULT - PROBLEM SELECTOR PLAN 1
Continue with the current  regimen while inpatient   add Metformin 500 mg QD and increase slowly to BID   once Insulin Resistance is decreased expect blood glucose to improve
Continue with the current  regimen while inpatient   can be discharged on current dose/ regimen
Continue with the current  regimen while inpatient   may need  more Metformin   while inpatient, finger sticks should be 100-180
Continue with the current  regimen while inpatient   while inpatient, finger sticks should be 100-180   can be discharged on current dose/ regimen
Continue with the current  regimen while inpatient   will need prandial lispro added and also Metformin low dose for a better control of blood glucose   while inpatient, finger sticks should be 100-180
Continue with the current  regimen while inpatient   Encourage more nutrition   while inpatient, finger sticks should be 100-180   can be discharged on current dose/ regimen
Continue with the current  regimen while inpatient   while inpatient, finger sticks should be 100-180
For now continue with the current diabetic regimen.  Addition of metformin may help and low-dose may be added if they are no contraindications to giving metformin currently  Goal is to have fingersticks 100-180
Continue with the current  regimen while inpatient   can be discharged on current dose/ regimen
Continue with the current  regimen while inpatient   can be discharged on current dose/ regimen
Continue with the current  regimen while inpatient   can be discharged on current dose/ regimen   while inpatient, finger sticks should be 100-180
Continue with the current  regimen while inpatient   can be discharged on current dose/ regimen

## 2024-10-22 NOTE — PROGRESS NOTE ADULT - ASSESSMENT
24 year old male with no significant PMHx who presented to the ED on 10/6/24 for complaints of left testicular pain. Pt admitted to medicine service for sepsis likely from a scrotal abscess. Urology following and decision was made to take pt to OR for I&D scrotal abscess. 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 Minimal EBL, given 1L IVF. No pressors administered. Pt extubated post-op with no complications.    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 No more sessions needed Per Dr. Vera.   - C/w daily wound dressing changes   - monitor off abx now per ID    #ETHAN   - Satting adequately on supplemental oxygen, maintain sats>92%   - Will need Outpatient sleep study    #Type 2 DM with hyperglycemia:  - Newly diagnosed, Hgb A1c 13  - C/w Lantus 25 U, Pre meal 10 U TID   - DM education   - Endo following-> d/c on current regimen    #HTN  - C/w Amlodipine 5 mg QD  - monitor BP    Diet: CCD   DVT prophylaxis: Lovenox   Dispo: rehab today

## 2024-10-22 NOTE — PROGRESS NOTE ADULT - SUBJECTIVE AND OBJECTIVE BOX
Patient is a 24y old  Male who presents with a chief complaint of Sepsis secondary to scrotal cellulitis vs. epididymitis (22 Oct 2024 15:37)      Interval History: finger sticks are in high 100s   on Lantus 25 units and prandial lispro 10 units       MEDICATIONS  (STANDING):  amLODIPine   Tablet 5 milliGRAM(s) Oral daily  chlorhexidine 2% Cloths 1 Application(s) Topical <User Schedule>  collagenase Ointment 1 Application(s) Topical daily  dextrose 5%. 1000 milliLiter(s) (50 mL/Hr) IV Continuous <Continuous>  dextrose 5%. 1000 milliLiter(s) (100 mL/Hr) IV Continuous <Continuous>  dextrose 50% Injectable 25 Gram(s) IV Push once  dextrose 50% Injectable 25 Gram(s) IV Push once  dextrose 50% Injectable 12.5 Gram(s) IV Push once  enoxaparin Injectable 40 milliGRAM(s) SubCutaneous every 12 hours  glucagon  Injectable 1 milliGRAM(s) IntraMuscular once  HYDROmorphone  Injectable 0.5 milliGRAM(s) IV Push once  influenza   Vaccine 0.5 milliLiter(s) IntraMuscular once  insulin glargine Injectable (LANTUS) 25 Unit(s) SubCutaneous every morning  insulin lispro (ADMELOG) corrective regimen sliding scale   SubCutaneous three times a day before meals  insulin lispro (ADMELOG) corrective regimen sliding scale   SubCutaneous at bedtime  insulin lispro Injectable (ADMELOG) 10 Unit(s) SubCutaneous before breakfast  insulin lispro Injectable (ADMELOG) 10 Unit(s) SubCutaneous before lunch  insulin lispro Injectable (ADMELOG) 10 Unit(s) SubCutaneous before dinner  metFORMIN 500 milliGRAM(s) Oral daily  nystatin Cream 1 Application(s) Topical two times a day  pantoprazole    Tablet 40 milliGRAM(s) Oral before breakfast    MEDICATIONS  (PRN):  acetaminophen     Tablet .. 975 milliGRAM(s) Oral every 6 hours PRN Moderate Pain (4 - 6)  dextrose Oral Gel 15 Gram(s) Oral once PRN Blood Glucose LESS THAN 70 milliGRAM(s)/deciliter      Allergies    No Known Allergies    Intolerances        REVIEW OF SYSTEMS:  CONSTITUTIONAL: no changes  EYES: No eye pain, visual disturbances, or discharge  ENMT:  No difficulty hearing, No sinus or throat pain  NECK: No pain or stiffness  RESPIRATORY: No cough, wheezing, chills or hemoptysis; No shortness of breath  CARDIOVASCULAR: No chest pain, palpitations or leg swelling  GASTROINTESTINAL: No abdominal or epigastric pain. No nausea, vomiting, or hematemesis; No diarrhea or constipation. No melena or hematochezia.  GENITOURINARY: No dysuria, frequency, hematuria, or incontinence  NEUROLOGICAL: No headaches, memory loss, loss of strength, numbness, or tremors  SKIN: No itching, burning, rashes, or lesions   ENDOCRINE: No heat or cold intolerance; No hair loss  MUSCULOSKELETAL: No joint pain or swelling; No muscle, back, or extremity pain  PSYCHIATRIC: No depression, anxiety, mood swings, or difficulty sleeping  HEME/LYMPH: No easy bruising, or bleeding gums  ALLERY AND IMMUNOLOGIC: No hives or eczema    Vital Signs Last 24 Hrs  T(C): 36.5 (22 Oct 2024 11:02), Max: 36.5 (21 Oct 2024 23:14)  T(F): 97.7 (22 Oct 2024 11:02), Max: 97.7 (21 Oct 2024 23:14)  HR: 91 (22 Oct 2024 11:02) (80 - 91)  BP: 137/95 (22 Oct 2024 11:02) (128/78 - 145/82)  BP(mean): --  RR: 18 (22 Oct 2024 11:02) (17 - 18)  SpO2: 97% (22 Oct 2024 11:02) (96% - 98%)    Parameters below as of 22 Oct 2024 11:02  Patient On (Oxygen Delivery Method): room air        PHYSICAL EXAM:  GENERAL:   HEAD: Atraumatic, Normocephalic  EYES: PERRLA, conjunctiva and sclera clear  ENMT: No  exudates,; Moist mucous membranes,, No lesions  NECK: Supple, No JVD, Normal thyroid  NERVOUS SYSTEM:  Alert & Oriented,   CHEST/LUNG: Clear to auscultation bilaterally; No rales, rhonchi, wheezing, or rubs  HEART: Regular rate and rhythm; No murmurs, rubs, or gallops  ABDOMEN: Soft, Nontender, Nondistended; Bowel sounds present  EXTREMITIES:  2+ Peripheral Pulses, no edema  SKIN: No rashes or lesions    LABS:      Urinalysis Basic - ( 21 Oct 2024 07:50 )    Color: x / Appearance: x / SG: x / pH: x  Gluc: 161 mg/dL / Ketone: x  / Bili: x / Urobili: x   Blood: x / Protein: x / Nitrite: x   Leuk Esterase: x / RBC: x / WBC x   Sq Epi: x / Non Sq Epi: x / Bacteria: x      CAPILLARY BLOOD GLUCOSE      POCT Blood Glucose.: 168 mg/dL (22 Oct 2024 11:19)  POCT Blood Glucose.: 159 mg/dL (22 Oct 2024 07:44)  POCT Blood Glucose.: 211 mg/dL (21 Oct 2024 21:45)    Lipid panel:           Thyroid:  Diabetes Tests:  Parathyroid Panel:  Adrenals:  RADIOLOGY & ADDITIONAL TESTS:    Imaging Personally Reviewed:  [ ] YES  [ ] NO    Consultant(s) Notes Reviewed:  [ ] YES  [ ] NO    Care Discussed with Consultants/Other Providers [ ] YES  [ ] NO

## 2024-10-22 NOTE — PROGRESS NOTE ADULT - PROVIDER SPECIALTY LIST ADULT
Endocrinology
Endocrinology
Hospitalist
Infectious Disease
Infectious Disease
Urology
Critical Care
Endocrinology
Hospitalist
Infectious Disease
Infectious Disease
Urology
Hospitalist
Surgery
Urology
Urology
Critical Care
Endocrinology
Hospitalist
Infectious Disease
Infectious Disease
Endocrinology

## 2024-10-22 NOTE — PROGRESS NOTE ADULT - NS ATTEND AMEND GEN_ALL_CORE FT
I agree with the statements above
I have reviewed all pertinent clinical information and agree with the NP's note.  Any new labs, recent cultures, new imaging (if applicable) and vitals have been reviewed today.  All necessary adjustments to management have been made.  Agree with the above assessment and plan.      Discussed plan with patients primary team.    Rome Briones DO  Chief, Infectious Disease at [facility]  Reachable via Microsoft Teams or ID office: [phone]  Weekdays: After 5pm, please call [phone] for all inquiries and new consults  Weekends: Message on-call infectious disease physician via teams (see Skip)
I have reviewed all pertinent clinical information and agree with the NP's note.  Any new labs, recent cultures, new imaging (if applicable) and vitals have been reviewed today.  All necessary adjustments to management have been made.  Agree with the above assessment and plan.      Rome Briones, DO  Chief, Infectious Disease at [facility]  Reachable via Microsoft Teams or ID office: [phone]  Weekdays: After 5pm, please call [phone] for all inquiries and new consults  Weekends: Message on-call infectious disease physician via teams (see Skip)
I have reviewed all pertinent clinical information and agree with the NP's note.  Any new labs, recent cultures, new imaging (if applicable) and vitals have been reviewed today.  All necessary adjustments to management have been made.  Agree with the above assessment and plan.    pain comes and goes and has largely resolved today   surgical/debrided site healing well  s/p hyperbaric oxygen therapy now completed   some degree of clear drainage acceptable   continue to monitor off antibiotics and ensure good endocrinology follow up outpatient     Discussed plan with patients primary team.    Rome Briones DO  Chief, Infectious Disease at [facility]  Reachable via Microsoft Teams or ID office: [phone]  Weekdays: After 5pm, please call [phone] for all inquiries and new consults  Weekends: Message on-call infectious disease physician via teams (ginny Valdivia)
I have reviewed all pertinent clinical information and agree with the NP's note.  Any new labs, recent cultures, new imaging (if applicable) and vitals have been reviewed today.  All necessary adjustments to management have been made.  Agree with the above assessment and plan.      Discussed plan with patients primary team.    Rome Birones DO  Chief, Infectious Disease at [facility]  Reachable via Microsoft Teams or ID office: [phone]  Weekdays: After 5pm, please call [phone] for all inquiries and new consults  Weekends: Message on-call infectious disease physician via teams (see Skip)
I have reviewed all pertinent clinical information and agree with the NP's note.  Any new labs, recent cultures, new imaging (if applicable) and vitals have been reviewed today.  All necessary adjustments to management have been made.  Agree with the above assessment and plan.      Discussed plan with patients primary team.    Rome Briones DO  Chief, Infectious Disease at [facility]  Reachable via Microsoft Teams or ID office: [phone]  Weekdays: After 5pm, please call [phone] for all inquiries and new consults  Weekends: Message on-call infectious disease physician via teams (see Skip)
I have reviewed all pertinent clinical information and agree with the NP's note.  Any new labs, recent cultures, new imaging (if applicable) and vitals have been reviewed today.  All necessary adjustments to management have been made.  Agree with the above assessment and plan.    continue antibiotics as above  if platelet count drops please stop linezolid     Discussed plan with patients primary team.    Rome Briones, DO  Chief, Infectious Disease at [facility]  Reachable via Microsoft Teams or ID office: [phone]  Weekdays: After 5pm, please call [phone] for all inquiries and new consults  Weekends: Message on-call infectious disease physician via teams (see Skip)
I agree with the statements above

## 2024-10-22 NOTE — PROGRESS NOTE ADULT - SUBJECTIVE AND OBJECTIVE BOX
CHA MCCAIN JR  MRN-[id number]  24y (2000)    Follow Up:  paul's gangrene     Interval History: The pt was seen and examined earlier, not in acute distress, no new complaints, out of bed to chair. Pt is afebrile, RA, no WBC - normalized.     PAST MEDICAL & SURGICAL HISTORY:  No pertinent past medical history          ROS:    [ ] Unobtainable because:  [x ] All other systems negative    Constitutional: no fever, no chills  Head: no trauma  Eyes: no vision changes, no eye pain  ENT:  no sore throat, no rhinorrhea  Cardiovascular:  no chest pain, no palpitation  Respiratory:  no SOB, no cough  GI:  no abd pain, no vomiting, no diarrhea  urinary: no dysuria, no hematuria, no flank pain, scrotal pain is controlled   musculoskeletal:  no joint pain, no joint swelling  skin:  no rash  neurology:  no headache, no seizure, no change in mental status  psych: no anxiety, no depression         Allergies  No Known Allergies        ANTIMICROBIALS:      OTHER MEDS:  acetaminophen     Tablet .. 975 milliGRAM(s) Oral every 6 hours PRN  amLODIPine   Tablet 5 milliGRAM(s) Oral daily  chlorhexidine 2% Cloths 1 Application(s) Topical <User Schedule>  collagenase Ointment 1 Application(s) Topical daily  dextrose 5%. 1000 milliLiter(s) IV Continuous <Continuous>  dextrose 5%. 1000 milliLiter(s) IV Continuous <Continuous>  dextrose 50% Injectable 25 Gram(s) IV Push once  dextrose 50% Injectable 25 Gram(s) IV Push once  dextrose 50% Injectable 12.5 Gram(s) IV Push once  dextrose Oral Gel 15 Gram(s) Oral once PRN  enoxaparin Injectable 40 milliGRAM(s) SubCutaneous every 12 hours  glucagon  Injectable 1 milliGRAM(s) IntraMuscular once  HYDROmorphone  Injectable 0.5 milliGRAM(s) IV Push once  influenza   Vaccine 0.5 milliLiter(s) IntraMuscular once  insulin glargine Injectable (LANTUS) 25 Unit(s) SubCutaneous every morning  insulin lispro (ADMELOG) corrective regimen sliding scale   SubCutaneous three times a day before meals  insulin lispro (ADMELOG) corrective regimen sliding scale   SubCutaneous at bedtime  insulin lispro Injectable (ADMELOG) 10 Unit(s) SubCutaneous before breakfast  insulin lispro Injectable (ADMELOG) 10 Unit(s) SubCutaneous before lunch  insulin lispro Injectable (ADMELOG) 10 Unit(s) SubCutaneous before dinner  metFORMIN 500 milliGRAM(s) Oral daily  nystatin Cream 1 Application(s) Topical two times a day  pantoprazole    Tablet 40 milliGRAM(s) Oral before breakfast      Vital Signs Last 24 Hrs  T(C): 36.5 (22 Oct 2024 11:02), Max: 36.7 (21 Oct 2024 16:57)  T(F): 97.7 (22 Oct 2024 11:02), Max: 98.1 (21 Oct 2024 16:57)  HR: 91 (22 Oct 2024 11:02) (80 - 100)  BP: 137/95 (22 Oct 2024 11:02) (128/78 - 161/91)  BP(mean): --  RR: 18 (22 Oct 2024 11:02) (17 - 18)  SpO2: 97% (22 Oct 2024 11:02) (96% - 98%)    Parameters below as of 22 Oct 2024 11:02  Patient On (Oxygen Delivery Method): room air        Physical Exam:  Constitutional: non-toxic, no distress, RA  HEAD/EYES: anicteric, no conjunctival injection  ENT:  supple, no thrush  Cardiovascular:   normal S1, S2, no murmur, no edema  Respiratory:  clear BS bilaterally, no wheezes, no rales  GI:  soft, non-tender, normal bowel sounds  :  no dewitt, no CVA tenderness, scrotal area is dressed c/d/i, no b/l groin tenderness on the exam  Musculoskeletal:  no synovitis, normal ROM  Neurologic: awake and alert, normal strength, no focal findings  Skin:  mild intertrigo in b/l groins - somewhat better,  no phlebitis, pt has no piv   Heme/Onc: no lymphadenopathy   Psychiatric:  awake, alert, appropriate mood    WBC Count: 7.98 K/uL (10-22 @ 06:28)  WBC Count: 13.05 K/uL (10-21 @ 07:50)                            13.5   7.98  )-----------( 313      ( 22 Oct 2024 06:28 )             40.5       10-21    141  |  109[H]  |  9   ----------------------------<  161[H]  3.8   |  26  |  0.87    Ca    9.1      21 Oct 2024 07:50        Urinalysis Basic - ( 21 Oct 2024 07:50 )    Color: x / Appearance: x / SG: x / pH: x  Gluc: 161 mg/dL / Ketone: x  / Bili: x / Urobili: x   Blood: x / Protein: x / Nitrite: x   Leuk Esterase: x / RBC: x / WBC x   Sq Epi: x / Non Sq Epi: x / Bacteria: x        Creatinine Trend: 0.87<--, 0.99<--, 0.92<--, 0.84<--, 0.80<--, 1.00<--      MICROBIOLOGY:  v  Clean Catch Clean Catch (Midstream)  10-10-24   No growth  --  --      .Surgical Swab  10-07-24   Moderate Prevotella disiens "Susceptibilities not performed"  Moderate Prevotella timonensis "Susceptibilities not performed"  --  --      Clean Catch Clean Catch (Midstream)  10-06-24   <10,000 CFU/mL Normal Urogenital Henny  --  --      .Blood BLOOD  10-06-24   No growth at 5 days  --  --      .Blood BLOOD  10-06-24   No growth at 5 days  --  --      Clean Catch Clean Catch (Midstream)  10-04-24   <10,000 CFU/mL Normal Urogenital Henny  --  --        HIV-1 RNA Quantitative, Viral Load Log: NOT DET. lg /mL (10-09-24 @ 03:00)          C-Reactive Protein: 231 (10-09)                RADIOLOGY:

## 2024-10-22 NOTE — PROGRESS NOTE ADULT - ASSESSMENT
24M with Paul's gangrene now POD#1 S/P scrotal exploration, excision/debridement of paul's gangrene  see brief op report which showed:  purulent drainage upon entering scrotum, devitalized tissue excised, slough debrided from b/l scrotum  had fever leukocytosis, tachycardia     10/10: downgraded to the medical floor, no fevers since 10/8, RA, WBC normalized, Cr ok, BCs, UC and surgical culture NGTD, Vancomycin and clindamycin were stopped, Zosyn IV and Linezolid continued.   10/11: no fever, RA,  no new cbc, Cr ok surgical cx and BCs NGTD, the wound is being monitored by urology / surgery, possibly will need further debridement next week, pending hospital course. Linezolid and Zosyn IV continued.   10/14: doing well, no fevers, RA, no wbc, Cr ok, surgical cultures growing Prevotella, today is day # 7 post op, will stop abx after today's doses.    10/15: no fever, RA, no wbc, BCs NGTD, surgical culture is growing Prevotella, pt is being monitored off abx.   10/21: afebrile, RA, WBC elevated 13.05, Cr ok, prior BCs and UC NgTD, surgical culture grew Prevotella, pt completed course of abx, will continue to monitor off abx.    Attending addendum:  pain comes and goes and has largely resolved today   surgical/debrided site healing well  s/p hyperbaric oxygen therapy now completed   some degree of clear drainage acceptable   continue to monitor off antibiotics and ensure good endocrinology follow up outpatient   10/22: no fever, RA, WBC normalized, pt is being monitored off abx, possible discharge to the rehab today.       #Paul's gangrene   #Cellulitis   #Sepsis  #newly Diabetes mellitus type 2  #STD screening     Plan:  course of abx is complete, pt is being monitored off abx   local wound care, daily dressing change per nursing   DVT prophylaxis, Incentive Spirometer, OOB, pain control  trend wbc count   hyperbaric oxygen therapy per vascular / wound care team   HIV screen is negative   Gc/chlamydia - negative   syphilis screen negative   newly diagnosed DM2-needs good insulin regimen, endocrinology consult is appreciated   recognize he is insulin naive and could be very sensitive to insulin doses and need to be mindful of doses to avoid hypoglycemic episodes   possible discharge to the rehab today       will discuss with Dr. Briones  discussed with RN    24M with Paul's gangrene now POD#1 S/P scrotal exploration, excision/debridement of paul's gangrene  see brief op report which showed:  purulent drainage upon entering scrotum, devitalized tissue excised, slough debrided from b/l scrotum  had fever leukocytosis, tachycardia     10/10: downgraded to the medical floor, no fevers since 10/8, RA, WBC normalized, Cr ok, BCs, UC and surgical culture NGTD, Vancomycin and clindamycin were stopped, Zosyn IV and Linezolid continued.   10/11: no fever, RA,  no new cbc, Cr ok surgical cx and BCs NGTD, the wound is being monitored by urology / surgery, possibly will need further debridement next week, pending hospital course. Linezolid and Zosyn IV continued.   10/14: doing well, no fevers, RA, no wbc, Cr ok, surgical cultures growing Prevotella, today is day # 7 post op, will stop abx after today's doses.    10/15: no fever, RA, no wbc, BCs NGTD, surgical culture is growing Prevotella, pt is being monitored off abx.   10/21: afebrile, RA, WBC elevated 13.05, Cr ok, prior BCs and UC NgTD, surgical culture grew Prevotella, pt completed course of abx, will continue to monitor off abx.    Attending addendum:  pain comes and goes and has largely resolved today   surgical/debrided site healing well  s/p hyperbaric oxygen therapy now completed   some degree of clear drainage acceptable   continue to monitor off antibiotics and ensure good endocrinology follow up outpatient   10/22: no fever, RA, WBC normalized, pt is being monitored off abx, possible discharge to the rehab today.       #Paul's gangrene   #Cellulitis   #Sepsis  #newly Diabetes mellitus type 2  #STD screening     Plan:  course of abx is complete, pt is being monitored off abx   local wound care, daily dressing change per nursing   DVT prophylaxis, Incentive Spirometer, OOB, pain control  trend wbc count   hyperbaric oxygen therapy per vascular / wound care team   HIV screen is negative   Gc/chlamydia - negative   syphilis screen negative   newly diagnosed DM2-needs good insulin regimen, endocrinology consult is appreciated   recognize he is insulin naive and could be very sensitive to insulin doses and need to be mindful of doses to avoid hypoglycemic episodes   possible discharge to the rehab today       discussed with Dr. Briones  discussed with JAY

## 2024-10-22 NOTE — PROGRESS NOTE ADULT - NS ATTEND OPT1 GEN_ALL_CORE
I attest my time as attending is greater than 50% of the total combined time spent on qualifying patient care activities by the PA/NP and attending.
I independently performed the documented:
I independently performed the documented:
I attest my time as attending is greater than 50% of the total combined time spent on qualifying patient care activities by the PA/NP and attending.
I attest my time as attending is greater than 50% of the total combined time spent on qualifying patient care activities by the PA/NP and attending.
I independently performed the documented:

## 2024-10-30 DIAGNOSIS — N49.2 INFLAMMATORY DISORDERS OF SCROTUM: ICD-10-CM

## 2024-10-30 DIAGNOSIS — A41.9 SEPSIS, UNSPECIFIED ORGANISM: ICD-10-CM

## 2024-10-30 DIAGNOSIS — G47.33 OBSTRUCTIVE SLEEP APNEA (ADULT) (PEDIATRIC): ICD-10-CM

## 2024-10-30 DIAGNOSIS — E11.65 TYPE 2 DIABETES MELLITUS WITH HYPERGLYCEMIA: ICD-10-CM

## 2024-10-30 DIAGNOSIS — J35.2 HYPERTROPHY OF ADENOIDS: ICD-10-CM

## 2024-10-30 DIAGNOSIS — D72.825 BANDEMIA: ICD-10-CM

## 2024-10-30 DIAGNOSIS — N49.3 FOURNIER GANGRENE: ICD-10-CM

## 2024-10-30 DIAGNOSIS — I10 ESSENTIAL (PRIMARY) HYPERTENSION: ICD-10-CM

## 2025-06-18 NOTE — PATIENT PROFILE ADULT - FUNCTIONAL ASSESSMENT - BASIC MOBILITY 2.
For information on Fall & Injury Prevention, visit: [email or web address] OR  [email or web address] OR  https://www.cdc.gov/steadi/patient.html
4 = No assist / stand by assistance